# Patient Record
Sex: FEMALE | Race: BLACK OR AFRICAN AMERICAN | Employment: OTHER | URBAN - METROPOLITAN AREA
[De-identification: names, ages, dates, MRNs, and addresses within clinical notes are randomized per-mention and may not be internally consistent; named-entity substitution may affect disease eponyms.]

---

## 2017-01-04 ENCOUNTER — OFFICE VISIT (OUTPATIENT)
Dept: CARDIOLOGY CLINIC | Age: 82
End: 2017-01-04

## 2017-01-04 DIAGNOSIS — Z95.810 S/P IMPLANTATION OF AUTOMATIC CARDIOVERTER/DEFIBRILLATOR (AICD): Primary | ICD-10-CM

## 2017-01-04 DIAGNOSIS — I43 CARDIOMYOPATHY IN OTHER DISEASES CLASSIFIED ELSEWHERE: ICD-10-CM

## 2017-01-04 DIAGNOSIS — I49.5 SICK SINUS SYNDROME (HCC): ICD-10-CM

## 2017-01-04 DIAGNOSIS — I50.22 CHRONIC SYSTOLIC HEART FAILURE (HCC): ICD-10-CM

## 2017-01-06 NOTE — PROGRESS NOTES
See device report- [facility] [place] ICD remote device send, next send in 3 months.

## 2017-01-23 RX ORDER — FUROSEMIDE 80 MG/1
TABLET ORAL
Qty: 180 TAB | Refills: 4 | Status: SHIPPED | OUTPATIENT
Start: 2017-01-23 | End: 2018-01-23 | Stop reason: SDUPTHER

## 2017-01-24 RX ORDER — POTASSIUM CHLORIDE 750 MG/1
TABLET, FILM COATED, EXTENDED RELEASE ORAL
Qty: 270 TAB | Refills: 0 | Status: SHIPPED | OUTPATIENT
Start: 2017-01-24 | End: 2017-08-01 | Stop reason: SDUPTHER

## 2017-03-13 ENCOUNTER — OFFICE VISIT (OUTPATIENT)
Dept: CARDIOLOGY CLINIC | Age: 82
End: 2017-03-13

## 2017-03-13 VITALS
WEIGHT: 203 LBS | DIASTOLIC BLOOD PRESSURE: 70 MMHG | BODY MASS INDEX: 34.66 KG/M2 | HEIGHT: 64 IN | OXYGEN SATURATION: 98 % | RESPIRATION RATE: 18 BRPM | SYSTOLIC BLOOD PRESSURE: 130 MMHG | HEART RATE: 76 BPM

## 2017-03-13 DIAGNOSIS — I25.10 ATHEROSCLEROSIS OF NATIVE CORONARY ARTERY OF NATIVE HEART WITHOUT ANGINA PECTORIS: ICD-10-CM

## 2017-03-13 DIAGNOSIS — I50.42 CHRONIC COMBINED SYSTOLIC AND DIASTOLIC CHF (CONGESTIVE HEART FAILURE) (HCC): Primary | ICD-10-CM

## 2017-03-13 DIAGNOSIS — I43 CARDIOMYOPATHY IN OTHER DISEASES CLASSIFIED ELSEWHERE: ICD-10-CM

## 2017-03-13 DIAGNOSIS — Z95.810 S/P IMPLANTATION OF AUTOMATIC CARDIOVERTER/DEFIBRILLATOR (AICD): ICD-10-CM

## 2017-03-13 DIAGNOSIS — E78.2 MIXED HYPERLIPIDEMIA: ICD-10-CM

## 2017-03-13 DIAGNOSIS — I49.5 SICK SINUS SYNDROME (HCC): ICD-10-CM

## 2017-03-13 DIAGNOSIS — I10 ESSENTIAL HYPERTENSION: Chronic | ICD-10-CM

## 2017-03-13 NOTE — PROGRESS NOTES
[address] [address]  [phone]     Subjective:      Ina Valdivia is a 80 y.o. female is here for routine f/u. The patient denies chest pain/ shortness of breath, orthopnea, PND, LE edema, palpitations, syncope, or presyncope.        Patient Active Problem List    Diagnosis Date Noted    Syncope 05/26/2016    S/P implantation of automatic cardioverter/defibrillator (AICD) 01/14/2015    Pulmonary HTN ([address] 64/84/0934    Systolic and diastolic CHF, acute on chronic ([address] 09/03/2014    Acute-on-chronic respiratory failure ([address] 08/27/2014    Hypothyroidism 08/27/2014    Asthma 06/12/2013    Moderate to severe pulmonary hypertension ([address] 06/12/2013    Ischemic colitis ([address] 05/14/2012    Colitis, ischemic ([address] 03/26/2012    Hypokalemia 03/23/2012    Hypomagnesemia 03/23/2012    Rectal bleeding 03/22/2012    Chronic systolic heart failure ([address] 03/08/2012    Atrial fibrillation ([address] 03/08/2012    Cardiac pacemaker in situ 03/08/2012    Cardiomyopathy in other diseases classified elsewhere ([address] 03/08/2012    Chronic diastolic heart failure ([address] 03/08/2012    Atrial flutter ([address] 03/08/2012    Coronary atherosclerosis of native coronary artery 03/08/2012    Chest pain 02/25/2012    Sick sinus syndrome ([address] 02/24/2012    HTN (hypertension) 02/24/2012    Hyperlipidemia 02/24/2012      Merrick Ulloa MD  Past Medical History:   Diagnosis Date    Asthma     Autoimmune disease ([address]     lupus    CAD (coronary artery disease)     cardiac cath    CAD (coronary artery disease)     sick sinus syndrome    Essential hypertension     GERD (gastroesophageal reflux disease)     Glaucoma     Heart failure (HCC)     HX OTHER MEDICAL     Lupus    Hypertension     Other ill-defined conditions(799.89)     Pacemaker     PUD (peptic ulcer disease)     S/P implantation of automatic cardioverter/defibrillator (AICD) 1/14/2015    [employer] upgrade to AICD implant    SSS (sick sinus syndrome) ([facility]       Past Surgical History:   Procedure Laterality Date    CARDIAC SURG PROCEDURE UNLIST      pacemaker/defibrilator.  HX GYN      BTL    HX PACEMAKER      OK COLONOSCOPY W/BIOPSY SINGLE/MULTIPLE  3/26/2012         OK COLSC FLX W/RMVL OF TUMOR POLYP LESION SNARE TQ  3/26/2012          Allergies   Allergen Reactions    Contrast Agent [Iodine] Anaphylaxis     Made aware of allergy 1/4/13    Sulfa (Sulfonamide Antibiotics) Hives    Codeine Anxiety    Pcn [Penicillins] Anaphylaxis     Reaction was to penicillin injections into buttocks. She can take oral amoxicillin      Family History   Problem Relation Age of Onset    Arrhythmia Mother     Hypertension Mother     Hypertension Father       Social History     Social History    Marital status: SINGLE     Spouse name: N/A    Number of children: N/A    Years of education: N/A     Occupational History    Not on file. Social History Main Topics    Smoking status: Never Smoker    Smokeless tobacco: Never Used    Alcohol use No    Drug use: No    Sexual activity: Not on file     Other Topics Concern    Not on file     Social History Narrative      Current Outpatient Prescriptions   Medication Sig    potassium chloride SR (KLOR-CON 10) 10 mEq tablet TAKE 3 TABLETS BY MOUTH EVERY DAY    furosemide (LASIX) 80 mg tablet TAKE 1 TABLET BY MOUTH TWICE DAILY    carvedilol (COREG) 25 mg tablet TAKE 1 TABLET BY MOUTH TWICE DAILY WITH MEALS    OXYGEN-AIR DELIVERY SYSTEMS by Does Not Apply route.  atorvastatin (LIPITOR) 20 mg tablet Take 1 Tab by mouth daily.  lisinopril (PRINIVIL, ZESTRIL) 10 mg tablet TAKE 1 TABLET BY MOUTH DAILY    NEXIUM 40 mg capsule TAKE ONE CAPSULE BY MOUTH DAILY    FLUVIRIN 6210-2642 susp injection     albuterol-ipratropium (DUONEB) 2.5 mg-0.5 mg/3 ml nebulizer solution 3 mL by Nebulization route every four (4) hours as needed for Wheezing.     aspirin delayed-release 81 mg tablet Take 81 mg by mouth daily.  budesonide (PULMICORT) 180 mcg/actuation aepb inhaler Take 2 puffs by inhalation two (2) times a day.  fluticasone (FLONASE) 50 mcg/actuation nasal spray 2 sprays by Both Nostrils route daily as needed for Rhinitis.  HYDROcodone-acetaminophen (NORCO) 5-325 mg per tablet Take 1 tablet by mouth every four (4) hours as needed for Pain.  ciprofloxacin (CIPRO) 250 mg tablet Take 125 mg by mouth daily.  acetaminophen (TYLENOL ARTHRITIS PAIN) 650 mg CR tablet Take 650 mg by mouth every six (6) hours as needed.  latanoprost (XALATAN) 0.005 % ophthalmic solution Administer 1 Drop to both eyes nightly.  cetirizine (ZYRTEC) 10 mg tablet Take 10 mg by mouth daily. No current facility-administered medications for this visit. Facility-Administered Medications Ordered in Other Visits   Medication Dose Route Frequency    ADDaptor        vancomycin (VANCOCIN) 1,000 mg injection        0.9% sodium chloride (MBP/ADV) 0.9 % infusion        bacitracin 50,000 unit injection             Review of Symptoms:  11 systems reviewed, negative other than as stated in the HPI    Physical ExamPhysical Exam:    Vitals:    03/13/17 1343 03/13/17 1348   BP: 130/80 130/70   Pulse: 76    Resp: 18    SpO2: 98%    Weight: 203 lb (92.1 kg)    Height: 5' 4\" (1.626 m)      Body mass index is 34.84 kg/(m^2). General PE   Gen:  NAD  Mental Status - Alert. General Appearance - Not in acute distress. Chest and Lung Exam   Inspection: Accessory muscles - No use of accessory muscles in breathing. Auscultation:   Breath sounds: - Normal.   Cardiovascular   Inspection: Jugular vein - Bilateral - Inspection Normal.   Palpation/Percussion:   Apical Impulse: - Normal.   Auscultation: Rhythm - Regular. Heart Sounds - S1 WNL and S2 WNL. No S3 or S4. Murmurs & Other Heart Sounds: Auscultation of the heart reveals - No Murmurs.    Peripheral Vascular   Upper Extremity: Inspection - Bilateral - No Cyanotic nailbeds or Digital clubbing. Lower Extremity:   Palpation: Edema - Bilateral - No edema. Abdomen:   Soft, non-tender, bowel sounds are active. Neuro: A&O times 3, CN and motor grossly WNL    Labs:   Lab Results   Component Value Date/Time    Cholesterol, total 124 12/12/2014 08:51 AM    Cholesterol, Total 184 01/02/2013 11:40 AM    HDL Cholesterol 45 12/12/2014 08:51 AM    HDL Cholesterol 44 01/02/2013 11:40 AM    LDL, calculated 64 12/12/2014 08:51 AM    LDL Cholesterol 115 01/02/2013 11:40 AM    Triglyceride 75 12/12/2014 08:51 AM     Lab Results   Component Value Date/Time    CK 87 10/21/2016 12:06 AM     Lab Results   Component Value Date/Time    Sodium 141 10/21/2016 12:06 AM    Potassium 3.3 10/21/2016 12:06 AM    Chloride 101 10/21/2016 12:06 AM    CO2 35 10/21/2016 12:06 AM    Anion gap 5 10/21/2016 12:06 AM    Glucose 132 10/21/2016 12:06 AM    BUN 16 10/21/2016 12:06 AM    Creatinine 1.19 10/21/2016 12:06 AM    BUN/Creatinine ratio 13 10/21/2016 12:06 AM    GFR est AA 53 10/21/2016 12:06 AM    GFR est non-AA 43 10/21/2016 12:06 AM    Calcium 8.3 10/21/2016 12:06 AM    Bilirubin, total 0.6 10/21/2016 12:06 AM    AST (SGOT) 11 10/21/2016 12:06 AM    Alk. phosphatase 75 10/21/2016 12:06 AM    Protein, total 7.7 10/21/2016 12:06 AM    Albumin 3.5 10/21/2016 12:06 AM    Globulin 4.2 10/21/2016 12:06 AM    A-G Ratio 0.8 10/21/2016 12:06 AM    ALT (SGPT) 11 10/21/2016 12:06 AM       EKG:  NSR, LVH     Assessment:     Assessment:      1. Chronic combined systolic and diastolic CHF (congestive heart failure) ([facility]    2. Essential hypertension    3. Sick sinus syndrome ([facility]    4. Mixed hyperlipidemia    5. Cardiomyopathy in other diseases classified elsewhere ([address]    6. Atherosclerosis of native coronary artery of native heart without angina pectoris    7.  S/P implantation of automatic cardioverter/defibrillator (AICD)        Orders Placed This Encounter    LIPID PANEL  AMB POC EKG ROUTINE W/ 12 LEADS, INTER & REP     Order Specific Question:   Reason for Exam:     Answer:   Routine        Plan: Active Problems:  Chronic systolic heart failure ([address] (3/8/2012)- well compensated. Echo with LVEF improved to 40-45% as noted as of May 2016. Creatinine stable on current medications as of October 2016.     S/P implantation of automatic cardioverter/defibrillator (AICD) (1/14/2015)  Overview: [address] upgrade to AICD implant     Syncope (5/26/2016)- probably vasovagal. No events noted on ICD interrogation. Nonorthostatic. Had 60% LAD lesion 2014. ? Atypical presentation of coronary disease. Clinical suspicion for this is low and the patient declined stress test. She states she is 80years old and will \"take her chances on that. \"    CAD:  Clinically stable. Continue aspirin, beta-blocker, and statin. She is due for lipid recheck. Lab slip provided. Counseled on diet and exercise.     Follow up in 6 months, sooner PRN.        Treasure Denis MD

## 2017-03-13 NOTE — MR AVS SNAPSHOT
Visit Information Date & Time Provider Department Dept. Phone Encounter #  
 3/13/2017  1:30 PM [facility] [phone] [id number]  
  
 4/5/2017  8:00 AM  
REMOTE OFFICE VISIT with [facility]) Appt Note: NOT AN OFFICE VISIT - REMOTE BSC ICD  
 [id number] [facility]  
[phone] [id number] [facility] Upcoming Health Maintenance Date Due  
 FOOT EXAM Q1 5/16/1944 MICROALBUMIN Q1 5/16/1944 EYE EXAM RETINAL OR DILATED Q1 5/16/1944 DTaP/Tdap/Td series (1 - Tdap) 5/16/1955 ZOSTER VACCINE AGE 60> 5/16/1994 GLAUCOMA SCREENING Q2Y 5/16/1999 OSTEOPOROSIS SCREENING (DEXA) 5/16/1999 MEDICARE YEARLY EXAM 5/16/1999 HEMOGLOBIN A1C Q6M 7/19/2015 LIPID PANEL Q1 12/12/2015 INFLUENZA AGE 9 TO ADULT 8/1/2016 Pneumococcal 65+ Low/Medium Risk (2 of 2 - PPSV23) 2/25/2017 Allergies as of 3/13/2017  Review Complete On: 3/13/2017 By: Nathen Thurman MD  
  
 Severity Noted Reaction Type Reactions Contrast Agent [Iodine] High 01/04/2013   Systemic Anaphylaxis Made aware of allergy 1/4/13 Sulfa (Sulfonamide Antibiotics) High 02/24/2012   Side Effect Hives Codeine  03/01/2012    Anxiety Pcn [Penicillins]  04/22/2011   Intolerance Anaphylaxis Reaction was to penicillin injections into buttocks. She can take oral amoxicillin Current Immunizations  Reviewed on 8/27/2014 Name Date Influenza Vaccine 10/1/2014 Influenza Vaccine Split 9/5/2011 Pneumococcal Vaccine (Unspecified Type) 2/25/2012  3:47 PM  
  
 Not reviewed this visit You Were Diagnosed With   
  
 Codes Comments Chronic combined systolic and diastolic CHF (congestive heart failure) (HCC)    -  Primary ICD-10-CM: I50.42 
ICD-9-CM: 428.42, 428.0 Essential hypertension     ICD-10-CM: I10 
ICD-9-CM: 401.9 Sick sinus syndrome (HCC)     ICD-10-CM: I49.5 ICD-9-CM: 427.81 Mixed hyperlipidemia     ICD-10-CM: E78.2 ICD-9-CM: 272.2 Cardiomyopathy in other diseases classified elsewhere [facility])     ICD-10-CM: X09 ICD-9-CM: 425.8 Atherosclerosis of native coronary artery of native heart without angina pectoris     ICD-10-CM: I25.10 ICD-9-CM: 414.01   
 S/P implantation of automatic cardioverter/defibrillator (AICD)     ICD-10-CM: Z95.810 ICD-9-CM: V45.02 Vitals BP Pulse Resp Height(growth percentile) Weight(growth percentile) SpO2  
 130/70 (BP 1 Location: Right arm, BP Patient Position: Sitting) 76 18 5' 4\" (1.626 m) 203 lb (92.1 kg) 98% BMI OB Status Smoking Status 34.84 kg/m2 Postmenopausal Never Smoker Vitals History BMI and BSA Data Body Mass Index Body Surface Area 34.84 kg/m 2 2.04 m 2 Preferred Pharmacy Pharmacy Name Phone [facility]  [place] [phone] Your Updated Medication List  
  
   
This list is accurate as of: 3/13/17  2:32 PM.  Always use your most recent med list.  
  
  
  
  
 aspirin delayed-release 81 mg tablet Take 81 mg by mouth daily. atorvastatin 20 mg tablet Commonly known as:  LIPITOR Take 1 Tab by mouth daily. budesonide 180 mcg/actuation Aepb inhaler Commonly known as:  PULMICORT Take 2 puffs by inhalation two (2) times a day. carvedilol 25 mg tablet Commonly known as:  COREG  
TAKE 1 TABLET BY MOUTH TWICE DAILY WITH MEALS  
  
 ciprofloxacin HCl 250 mg tablet Commonly known as:  CIPRO Take 125 mg by mouth daily. DUONEB 2.5 mg-0.5 mg/3 ml Nebu Generic drug:  albuterol-ipratropium 3 mL by Nebulization route every four (4) hours as needed for Wheezing. FLONASE 50 mcg/actuation nasal spray Generic drug:  fluticasone 2 sprays by Both Nostrils route daily as needed for Rhinitis. FLUVIRIN 8693-0845 Susp injection Generic drug:  influenza vaccine 2014-15(4yr+)  
  
 furosemide 80 mg tablet Commonly known as:  LASIX TAKE 1 TABLET BY MOUTH TWICE DAILY HYDROcodone-acetaminophen 5-325 mg per tablet Commonly known as:  Lorena Parisian Take 1 tablet by mouth every four (4) hours as needed for Pain.  
  
 latanoprost 0.005 % ophthalmic solution Commonly known as:  Felisa Sherine Administer 1 Drop to both eyes nightly. lisinopril 10 mg tablet Commonly known as:  PRINIVIL, ZESTRIL  
TAKE 1 TABLET BY MOUTH DAILY NexIUM 40 mg capsule Generic drug:  esomeprazole TAKE ONE CAPSULE BY MOUTH DAILY OXYGEN-AIR DELIVERY SYSTEMS  
by Does Not Apply route. potassium chloride SR 10 mEq tablet Commonly known as:  KLOR-CON 10  
TAKE 3 TABLETS BY MOUTH EVERY DAY  
  
 TYLENOL ARTHRITIS PAIN 650 mg CR tablet Generic drug:  acetaminophen Take 650 mg by mouth every six (6) hours as needed. ZyrTEC 10 mg tablet Generic drug:  cetirizine Take 10 mg by mouth daily. We Performed the Following AMB POC EKG ROUTINE W/ 12 LEADS, INTER & REP [08505 CPT(R)] LIPID PANEL [64946 CPT(R)] Introducing [facility]! Von Rodriguez introduces [facility] patient portal. Now you can access parts of your medical record, email your doctor's office, and request medication refills online. 1. In your internet browser, go to [email or web address] 2. Click on the First Time User? Click Here link in the Sign In box. You will see the New Member Sign Up page. 3. Enter your [employer] Access Code exactly as it appears below. You will not need to use this code after youve completed the sign-up process. If you do not sign up before the expiration date, you must request a new code. · [employer] Access Code: [email or web address] Expires: 4/6/2017 10:57 AM 
 
4.  Enter the last four digits of your Social Security Number (xxxx) and Date of Birth (mm/dd/yyyy) as indicated and click Submit. You will be taken to the next sign-up page. 5. Create a [employer] ID. This will be your [employer] login ID and cannot be changed, so think of one that is secure and easy to remember. 6. Create a [employer] password. You can change your password at any time. 7. Enter your Password Reset Question and Answer. This can be used at a later time if you forget your password. 8. Enter your e-mail address. You will receive e-mail notification when new information is available in [address] 9. Click Sign Up. You can now view and download portions of your medical record. 10. Click the Download Summary menu link to download a portable copy of your medical information. If you have questions, please visit the Frequently Asked Questions section of the [employer] website. Remember, [employer] is NOT to be used for urgent needs. For medical emergencies, dial 911. Now available from your iPhone and Android! Please provide this summary of care documentation to your next provider. Your primary care clinician is listed as Gio Pathak. If you have any questions after today's visit, please call [phone].

## 2017-04-05 ENCOUNTER — OFFICE VISIT (OUTPATIENT)
Dept: CARDIOLOGY CLINIC | Age: 82
End: 2017-04-05

## 2017-04-05 DIAGNOSIS — Z95.810 S/P IMPLANTATION OF AUTOMATIC CARDIOVERTER/DEFIBRILLATOR (AICD): Primary | ICD-10-CM

## 2017-04-05 DIAGNOSIS — I50.43 SYSTOLIC AND DIASTOLIC CHF, ACUTE ON CHRONIC (HCC): ICD-10-CM

## 2017-04-05 DIAGNOSIS — I50.32 CHRONIC DIASTOLIC HEART FAILURE (HCC): ICD-10-CM

## 2017-04-05 DIAGNOSIS — I49.5 SICK SINUS SYNDROME (HCC): ICD-10-CM

## 2017-04-24 RX ORDER — POTASSIUM CHLORIDE 750 MG/1
TABLET, FILM COATED, EXTENDED RELEASE ORAL
Qty: 270 TAB | Refills: 0 | Status: SHIPPED | OUTPATIENT
Start: 2017-04-24 | End: 2018-10-02 | Stop reason: DRUGHIGH

## 2017-04-28 LAB
CHOLEST SERPL-MCNC: 172 MG/DL (ref 100–199)
HDLC SERPL-MCNC: 39 MG/DL
INTERPRETATION, 910389: NORMAL
LDLC SERPL CALC-MCNC: 112 MG/DL (ref 0–99)
TRIGL SERPL-MCNC: 107 MG/DL (ref 0–149)
VLDLC SERPL CALC-MCNC: 21 MG/DL (ref 5–40)

## 2017-05-04 ENCOUNTER — TELEPHONE (OUTPATIENT)
Dept: CARDIOLOGY CLINIC | Age: 82
End: 2017-05-04

## 2017-05-04 DIAGNOSIS — E78.5 HYPERLIPIDEMIA, UNSPECIFIED HYPERLIPIDEMIA TYPE: Primary | ICD-10-CM

## 2017-05-04 DIAGNOSIS — I50.22 CHRONIC SYSTOLIC HEART FAILURE (HCC): ICD-10-CM

## 2017-05-04 RX ORDER — ATORVASTATIN CALCIUM 40 MG/1
40 TABLET, FILM COATED ORAL DAILY
Qty: 30 TAB | Refills: 2 | Status: SHIPPED | OUTPATIENT
Start: 2017-05-04 | End: 2017-07-28 | Stop reason: SDUPTHER

## 2017-05-04 RX ORDER — ATORVASTATIN CALCIUM 40 MG/1
TABLET, FILM COATED ORAL DAILY
COMMUNITY
End: 2017-05-04 | Stop reason: DRUGHIGH

## 2017-05-04 NOTE — TELEPHONE ENCOUNTER
Spoke with patient  Verified patient with 2 patient identifier    Informed per Dr Oly Ling  Cholesterol has almost doubled, concerning for the possibility that she is not taking her cholesterol medicine regularly.  If not, she needs to take it regularly.  If she is taking it regularly, need to increase Lipitor to 40 mg daily and repeat labs in 3 months.                Patient says is taking medication. Will increase Lipitor to 40 mg daily and repeat labs in 3 month, will work on diet.

## 2017-05-04 NOTE — TELEPHONE ENCOUNTER
----- Message from Pramod Stokes MD sent at 5/4/2017  4:14 PM EDT -----  Cholesterol has almost doubled, concerning for the possibility that she is not taking her cholesterol medicine regularly. If not, she needs to take it regularly. If she is taking it regularly, need to increase Lipitor to 40 mg daily and repeat labs in 3 months. Thanks.

## 2017-05-04 NOTE — PROGRESS NOTES
Cholesterol has almost doubled, concerning for the possibility that she is not taking her cholesterol medicine regularly. If not, she needs to take it regularly. If she is taking it regularly, need to increase Lipitor to 40 mg daily and repeat labs in 3 months. Thanks.

## 2017-07-05 ENCOUNTER — OFFICE VISIT (OUTPATIENT)
Dept: CARDIOLOGY CLINIC | Age: 82
End: 2017-07-05

## 2017-07-05 DIAGNOSIS — I50.22 CHRONIC SYSTOLIC HEART FAILURE (HCC): ICD-10-CM

## 2017-07-05 DIAGNOSIS — Z95.810 S/P IMPLANTATION OF AUTOMATIC CARDIOVERTER/DEFIBRILLATOR (AICD): Primary | ICD-10-CM

## 2017-07-05 DIAGNOSIS — I50.32 CHRONIC DIASTOLIC HEART FAILURE (HCC): ICD-10-CM

## 2017-07-05 DIAGNOSIS — I49.5 SICK SINUS SYNDROME (HCC): ICD-10-CM

## 2017-07-05 NOTE — PROGRESS NOTES
See device report - [facility] [place] ICD remote send, next remote send in 3 months. Reminder letter sent to schedule annual visit due.

## 2017-07-28 RX ORDER — ATORVASTATIN CALCIUM 40 MG/1
TABLET, FILM COATED ORAL
Qty: 90 TAB | Refills: 3 | Status: SHIPPED | OUTPATIENT
Start: 2017-07-28

## 2017-08-01 ENCOUNTER — OFFICE VISIT (OUTPATIENT)
Dept: CARDIOLOGY CLINIC | Age: 82
End: 2017-08-01

## 2017-08-01 VITALS
OXYGEN SATURATION: 97 % | SYSTOLIC BLOOD PRESSURE: 132 MMHG | DIASTOLIC BLOOD PRESSURE: 68 MMHG | HEART RATE: 80 BPM | RESPIRATION RATE: 20 BRPM | WEIGHT: 203.9 LBS | BODY MASS INDEX: 34.81 KG/M2 | HEIGHT: 64 IN

## 2017-08-01 DIAGNOSIS — I25.10 ATHEROSCLEROSIS OF NATIVE CORONARY ARTERY OF NATIVE HEART WITHOUT ANGINA PECTORIS: ICD-10-CM

## 2017-08-01 DIAGNOSIS — I27.20 MODERATE TO SEVERE PULMONARY HYPERTENSION (HCC): ICD-10-CM

## 2017-08-01 DIAGNOSIS — Z95.0 CARDIAC PACEMAKER IN SITU: ICD-10-CM

## 2017-08-01 DIAGNOSIS — E78.5 HYPERLIPIDEMIA, UNSPECIFIED HYPERLIPIDEMIA TYPE: ICD-10-CM

## 2017-08-01 DIAGNOSIS — Z95.810 S/P IMPLANTATION OF AUTOMATIC CARDIOVERTER/DEFIBRILLATOR (AICD): ICD-10-CM

## 2017-08-01 DIAGNOSIS — I49.5 SICK SINUS SYNDROME (HCC): ICD-10-CM

## 2017-08-01 DIAGNOSIS — R07.89 CHEST TIGHTNESS OR PRESSURE: Primary | ICD-10-CM

## 2017-08-01 DIAGNOSIS — I50.32 CHRONIC DIASTOLIC HEART FAILURE (HCC): ICD-10-CM

## 2017-08-01 NOTE — LETTER
8/1/2017 2:03 PM 
 
Patient:  Colleen Solomon YOB: 1934 Date of Visit: 8/1/2017 Dear MD Megan Fontenot [facility] [address] [id number] [address] Facsimile: [phone] 
 : 
 
 
Thank you for referring Ms. Janice Cruz to me for evaluation/treatment. Below are the relevant portions of my assessment and plan of care. If you have questions, please do not hesitate to call me. I look forward to following Ms. Alvarez along with you. Sincerely, Richad Mortimer, NP

## 2017-08-04 DIAGNOSIS — I50.22 CHRONIC SYSTOLIC HEART FAILURE (HCC): ICD-10-CM

## 2017-08-04 DIAGNOSIS — E78.5 HYPERLIPIDEMIA, UNSPECIFIED HYPERLIPIDEMIA TYPE: ICD-10-CM

## 2017-08-22 ENCOUNTER — APPOINTMENT (OUTPATIENT)
Dept: CT IMAGING | Age: 82
End: 2017-08-22
Attending: EMERGENCY MEDICINE
Payer: MEDICARE

## 2017-08-22 ENCOUNTER — HOSPITAL ENCOUNTER (EMERGENCY)
Age: 82
Discharge: HOME | End: 2017-08-22
Attending: EMERGENCY MEDICINE
Payer: MEDICARE

## 2017-08-22 ENCOUNTER — APPOINTMENT (OUTPATIENT)
Dept: GENERAL RADIOLOGY | Age: 82
End: 2017-08-22
Attending: EMERGENCY MEDICINE
Payer: MEDICARE

## 2017-08-22 VITALS
HEART RATE: 78 BPM | OXYGEN SATURATION: 97 % | HEIGHT: 62 IN | BODY MASS INDEX: 37.36 KG/M2 | DIASTOLIC BLOOD PRESSURE: 66 MMHG | TEMPERATURE: 98.2 F | SYSTOLIC BLOOD PRESSURE: 156 MMHG | WEIGHT: 203 LBS | RESPIRATION RATE: 20 BRPM

## 2017-08-22 DIAGNOSIS — D64.9 ANEMIA, UNSPECIFIED TYPE: ICD-10-CM

## 2017-08-22 DIAGNOSIS — N39.0 URINARY TRACT INFECTION WITH HEMATURIA, SITE UNSPECIFIED: Primary | ICD-10-CM

## 2017-08-22 DIAGNOSIS — R42 DIZZINESS: ICD-10-CM

## 2017-08-22 DIAGNOSIS — R31.9 URINARY TRACT INFECTION WITH HEMATURIA, SITE UNSPECIFIED: Primary | ICD-10-CM

## 2017-08-22 LAB
ALBUMIN SERPL-MCNC: 3.4 G/DL (ref 3.5–5)
ALBUMIN/GLOB SERPL: 0.8 {RATIO} (ref 1.1–2.2)
ALP SERPL-CCNC: 89 U/L (ref 45–117)
ALT SERPL-CCNC: 13 U/L (ref 12–78)
ANION GAP SERPL CALC-SCNC: 1 MMOL/L (ref 5–15)
APPEARANCE UR: ABNORMAL
AST SERPL-CCNC: 14 U/L (ref 15–37)
ATRIAL RATE: 84 BPM
BACTERIA URNS QL MICRO: ABNORMAL /HPF
BASOPHILS # BLD: 0 K/UL (ref 0–0.1)
BASOPHILS NFR BLD: 0 % (ref 0–1)
BILIRUB SERPL-MCNC: 0.5 MG/DL (ref 0.2–1)
BILIRUB UR QL: NEGATIVE
BNP SERPL-MCNC: 772 PG/ML (ref 0–450)
BUN SERPL-MCNC: 15 MG/DL (ref 6–20)
BUN/CREAT SERPL: 14 (ref 12–20)
CALCIUM SERPL-MCNC: 8.5 MG/DL (ref 8.5–10.1)
CALCULATED P AXIS, ECG09: 38 DEGREES
CALCULATED R AXIS, ECG10: -33 DEGREES
CALCULATED T AXIS, ECG11: 69 DEGREES
CHLORIDE SERPL-SCNC: 100 MMOL/L (ref 97–108)
CK MB CFR SERPL CALC: NORMAL % (ref 0–2.5)
CK MB SERPL-MCNC: <1 NG/ML (ref 5–25)
CK SERPL-CCNC: 77 U/L (ref 26–192)
CO2 SERPL-SCNC: 39 MMOL/L (ref 21–32)
COLOR UR: ABNORMAL
CREAT SERPL-MCNC: 1.08 MG/DL (ref 0.55–1.02)
DIAGNOSIS, 93000: NORMAL
EOSINOPHIL # BLD: 0.3 K/UL (ref 0–0.4)
EOSINOPHIL NFR BLD: 4 % (ref 0–7)
EPITH CASTS URNS QL MICRO: ABNORMAL /LPF
ERYTHROCYTE [DISTWIDTH] IN BLOOD BY AUTOMATED COUNT: 13.2 % (ref 11.5–14.5)
GLOBULIN SER CALC-MCNC: 4.5 G/DL (ref 2–4)
GLUCOSE SERPL-MCNC: 139 MG/DL (ref 65–100)
GLUCOSE UR STRIP.AUTO-MCNC: NEGATIVE MG/DL
HCT VFR BLD AUTO: 31 % (ref 35–47)
HGB BLD-MCNC: 9.4 G/DL (ref 11.5–16)
HGB UR QL STRIP: ABNORMAL
HYALINE CASTS URNS QL MICRO: ABNORMAL /LPF (ref 0–5)
KETONES UR QL STRIP.AUTO: NEGATIVE MG/DL
LEUKOCYTE ESTERASE UR QL STRIP.AUTO: ABNORMAL
LYMPHOCYTES # BLD: 1.9 K/UL (ref 0.8–3.5)
LYMPHOCYTES NFR BLD: 24 % (ref 12–49)
MCH RBC QN AUTO: 27.3 PG (ref 26–34)
MCHC RBC AUTO-ENTMCNC: 30.3 G/DL (ref 30–36.5)
MCV RBC AUTO: 90.1 FL (ref 80–99)
MONOCYTES # BLD: 0.9 K/UL (ref 0–1)
MONOCYTES NFR BLD: 12 % (ref 5–13)
NEUTS SEG # BLD: 4.8 K/UL (ref 1.8–8)
NEUTS SEG NFR BLD: 60 % (ref 32–75)
NITRITE UR QL STRIP.AUTO: NEGATIVE
P-R INTERVAL, ECG05: 178 MS
PH UR STRIP: 6 [PH] (ref 5–8)
PLATELET # BLD AUTO: 267 K/UL (ref 150–400)
POTASSIUM SERPL-SCNC: 3.4 MMOL/L (ref 3.5–5.1)
PROT SERPL-MCNC: 7.9 G/DL (ref 6.4–8.2)
PROT UR STRIP-MCNC: NEGATIVE MG/DL
Q-T INTERVAL, ECG07: 412 MS
QRS DURATION, ECG06: 118 MS
QTC CALCULATION (BEZET), ECG08: 486 MS
RBC # BLD AUTO: 3.44 M/UL (ref 3.8–5.2)
RBC #/AREA URNS HPF: ABNORMAL /HPF (ref 0–5)
SODIUM SERPL-SCNC: 140 MMOL/L (ref 136–145)
SP GR UR REFRACTOMETRY: 1.01 (ref 1–1.03)
TROPONIN I SERPL-MCNC: <0.04 NG/ML
UA: UC IF INDICATED,UAUC: ABNORMAL
UROBILINOGEN UR QL STRIP.AUTO: 1 EU/DL (ref 0.2–1)
VENTRICULAR RATE, ECG03: 84 BPM
WBC # BLD AUTO: 7.9 K/UL (ref 3.6–11)
WBC URNS QL MICRO: ABNORMAL /HPF (ref 0–4)

## 2017-08-22 PROCEDURE — 96366 THER/PROPH/DIAG IV INF ADDON: CPT

## 2017-08-22 PROCEDURE — 36415 COLL VENOUS BLD VENIPUNCTURE: CPT | Performed by: EMERGENCY MEDICINE

## 2017-08-22 PROCEDURE — 87186 SC STD MICRODIL/AGAR DIL: CPT | Performed by: EMERGENCY MEDICINE

## 2017-08-22 PROCEDURE — 96365 THER/PROPH/DIAG IV INF INIT: CPT

## 2017-08-22 PROCEDURE — 87077 CULTURE AEROBIC IDENTIFY: CPT | Performed by: EMERGENCY MEDICINE

## 2017-08-22 PROCEDURE — 87086 URINE CULTURE/COLONY COUNT: CPT | Performed by: EMERGENCY MEDICINE

## 2017-08-22 PROCEDURE — 74011250636 HC RX REV CODE- 250/636: Performed by: EMERGENCY MEDICINE

## 2017-08-22 PROCEDURE — 83880 ASSAY OF NATRIURETIC PEPTIDE: CPT | Performed by: EMERGENCY MEDICINE

## 2017-08-22 PROCEDURE — 85025 COMPLETE CBC W/AUTO DIFF WBC: CPT | Performed by: EMERGENCY MEDICINE

## 2017-08-22 PROCEDURE — 80053 COMPREHEN METABOLIC PANEL: CPT | Performed by: EMERGENCY MEDICINE

## 2017-08-22 PROCEDURE — 99285 EMERGENCY DEPT VISIT HI MDM: CPT

## 2017-08-22 PROCEDURE — 74011000258 HC RX REV CODE- 258: Performed by: EMERGENCY MEDICINE

## 2017-08-22 PROCEDURE — 93005 ELECTROCARDIOGRAM TRACING: CPT

## 2017-08-22 PROCEDURE — 81001 URINALYSIS AUTO W/SCOPE: CPT | Performed by: EMERGENCY MEDICINE

## 2017-08-22 PROCEDURE — 71020 XR CHEST PA LAT: CPT

## 2017-08-22 PROCEDURE — 70450 CT HEAD/BRAIN W/O DYE: CPT

## 2017-08-22 PROCEDURE — 82550 ASSAY OF CK (CPK): CPT | Performed by: EMERGENCY MEDICINE

## 2017-08-22 PROCEDURE — 84484 ASSAY OF TROPONIN QUANT: CPT | Performed by: EMERGENCY MEDICINE

## 2017-08-22 RX ORDER — SODIUM CHLORIDE 0.9 % (FLUSH) 0.9 %
5-10 SYRINGE (ML) INJECTION EVERY 8 HOURS
Status: DISCONTINUED | OUTPATIENT
Start: 2017-08-22 | End: 2017-08-22 | Stop reason: HOSPADM

## 2017-08-22 RX ORDER — CEFUROXIME AXETIL 500 MG/1
500 TABLET ORAL 2 TIMES DAILY
Qty: 14 TAB | Refills: 0 | Status: SHIPPED | OUTPATIENT
Start: 2017-08-22 | End: 2017-08-29

## 2017-08-22 RX ORDER — SODIUM CHLORIDE 0.9 % (FLUSH) 0.9 %
5-10 SYRINGE (ML) INJECTION AS NEEDED
Status: DISCONTINUED | OUTPATIENT
Start: 2017-08-22 | End: 2017-08-22 | Stop reason: HOSPADM

## 2017-08-22 RX ADMIN — CEFTRIAXONE 1 G: 1 INJECTION, POWDER, FOR SOLUTION INTRAMUSCULAR; INTRAVENOUS at 03:47

## 2017-08-22 NOTE — DISCHARGE INSTRUCTIONS
Anemia: Care Instructions  Your Care Instructions    Anemia is a low level of red blood cells, which carry oxygen throughout your body. Many things can cause anemia. Lack of iron is one of the most common causes. Your body needs iron to make hemoglobin, a substance in red blood cells that carries oxygen from the lungs to your body's cells. Without enough iron, the body produces fewer and smaller red blood cells. As a result, your body's cells do not get enough oxygen, and you feel tired and weak. And you may have trouble concentrating. Bleeding is the most common cause of a lack of iron. You may have heavy menstrual bleeding or bleeding caused by conditions such as ulcers, hemorrhoids, or cancer. Regular use of aspirin or other anti-inflammatory medicines (such as ibuprofen) also can cause bleeding in some people. A lack of iron in your diet also can cause anemia, especially at times when the body needs more iron, such as during pregnancy, infancy, and the teen years. Your doctor may have prescribed iron pills. It may take several months of treatment for your iron levels to return to normal. Your doctor also may suggest that you eat foods that are rich in iron, such as meat and beans. There are many other causes of anemia. It is not always due to a lack of iron. Finding the specific cause of your anemia will help your doctor find the right treatment for you. Follow-up care is a key part of your treatment and safety. Be sure to make and go to all appointments, and call your doctor if you are having problems. It's also a good idea to know your test results and keep a list of the medicines you take. How can you care for yourself at home? · Take your medicines exactly as prescribed. Call your doctor if you think you are having a problem with your medicine. · If your doctor recommends iron pills, take them as directed:  ¨ Try to take the pills on an empty stomach about 1 hour before or 2 hours after meals. But you may need to take iron with food to avoid an upset stomach. ¨ Do not take antacids or drink milk or caffeine drinks (such as coffee, tea, or cola) at the same time or within 2 hours of the time that you take your iron. They can make it hard for your body to absorb the iron. ¨ Vitamin C (from food or supplements) helps your body absorb iron. Try taking iron pills with a glass of orange juice or some other food that is high in vitamin C, such as citrus fruits. ¨ Iron pills may cause stomach problems, such as heartburn, nausea, diarrhea, constipation, and cramps. Be sure to drink plenty of fluids, and include fruits, vegetables, and fiber in your diet each day. Iron pills often make your bowel movements dark or green. ¨ If you forget to take an iron pill, do not take a double dose of iron the next time you take a pill. ¨ Keep iron pills out of the reach of small children. An overdose of iron can be very dangerous. · Follow your doctor's advice about eating iron-rich foods. These include red meat, shellfish, poultry, eggs, beans, raisins, whole-grain bread, and leafy green vegetables. · Steam vegetables to help them keep their iron content. When should you call for help? Call 911 anytime you think you may need emergency care. For example, call if:  · You have symptoms of a heart attack. These may include:  ¨ Chest pain or pressure, or a strange feeling in the chest.  ¨ Sweating. ¨ Shortness of breath. ¨ Nausea or vomiting. ¨ Pain, pressure, or a strange feeling in the back, neck, jaw, or upper belly or in one or both shoulders or arms. ¨ Lightheadedness or sudden weakness. ¨ A fast or irregular heartbeat. After you call 911, the  may tell you to chew 1 adult-strength or 2 to 4 low-dose aspirin. Wait for an ambulance. Do not try to drive yourself. · You passed out (lost consciousness).   Call your doctor now or seek immediate medical care if:  · You have new or increased shortness of breath. · You are dizzy or lightheaded, or you feel like you may faint. · Your fatigue and weakness continue or get worse. · You have any abnormal bleeding, such as:  ¨ Nosebleeds. ¨ Vaginal bleeding that is different (heavier, more frequent, at a different time of the month) than what you are used to. ¨ Bloody or black stools, or rectal bleeding. ¨ Bloody or pink urine. Watch closely for changes in your health, and be sure to contact your doctor if:  · You do not get better as expected. Where can you learn more? Go to http://nikki-elizabeth.info/. Enter R301 in the search box to learn more about \"Anemia: Care Instructions. \"  Current as of: October 13, 2016  Content Version: 11.3  © 7849-7105 [employer]. Care instructions adapted under license by [employer] (which disclaims liability or warranty for this information). If you have questions about a medical condition or this instruction, always ask your healthcare professional. [place] [address] any warranty or liability for your use of this information. Dizziness: Care Instructions  Your Care Instructions  Dizziness is the feeling of unsteadiness or fuzziness in your head. It is different than having vertigo, which is a feeling that the room is spinning or that you are moving or falling. It is also different from lightheadedness, which is the feeling that you are about to faint. It can be hard to know what causes dizziness. Some people feel dizzy when they have migraine headaches. Sometimes bouts of flu can make you feel dizzy. Some medical conditions, such as heart problems or high blood pressure, can make you feel dizzy. Many medicines can cause dizziness, including medicines for high blood pressure, pain, or anxiety. If a medicine causes your symptoms, your doctor may recommend that you stop or change the medicine.  If it is a problem with your heart, you may need medicine to help your heart work better. If there is no clear reason for your symptoms, your doctor may suggest watching and waiting for a while to see if the dizziness goes away on its own. Follow-up care is a key part of your treatment and safety. Be sure to make and go to all appointments, and call your doctor if you are having problems. It's also a good idea to know your test results and keep a list of the medicines you take. How can you care for yourself at home? · If your doctor recommends or prescribes medicine, take it exactly as directed. Call your doctor if you think you are having a problem with your medicine. · Do not drive while you feel dizzy. · Try to prevent falls. Steps you can take include:  ¨ Using nonskid mats, adding grab bars near the tub, and using night-lights. ¨ Clearing your home so that walkways are free of anything you might trip on. ¨ Letting family and friends know that you have been feeling dizzy. This will help them know how to help you. When should you call for help? Call 911 anytime you think you may need emergency care. For example, call if:  · You passed out (lost consciousness). · You have dizziness along with symptoms of a heart attack. These may include:  ¨ Chest pain or pressure, or a strange feeling in the chest.  ¨ Sweating. ¨ Shortness of breath. ¨ Nausea or vomiting. ¨ Pain, pressure, or a strange feeling in the back, neck, jaw, or upper belly or in one or both shoulders or arms. ¨ Lightheadedness or sudden weakness. ¨ A fast or irregular heartbeat. · You have symptoms of a stroke. These may include:  ¨ Sudden numbness, tingling, weakness, or loss of movement in your face, arm, or leg, especially on only one side of your body. ¨ Sudden vision changes. ¨ Sudden trouble speaking. ¨ Sudden confusion or trouble understanding simple statements. ¨ Sudden problems with walking or balance. ¨ A sudden, severe headache that is different from past headaches.   Call your doctor now or seek immediate medical care if:  · You feel dizzy and have a fever, headache, or ringing in your ears. · You have new or increased nausea and vomiting. · Your dizziness does not go away or comes back. Watch closely for changes in your health, and be sure to contact your doctor if:  · You do not get better as expected. Where can you learn more? Go to http://nikki-elizabeth.info/. Enter C971 in the search box to learn more about \"Dizziness: Care Instructions. \"  Current as of: March 20, 2017  Content Version: 11.3  © 2800-1300 [employer]. Care instructions adapted under license by [employer] (which disclaims liability or warranty for this information). If you have questions about a medical condition or this instruction, always ask your healthcare professional. [address] any warranty or liability for your use of this information. Urinary Tract Infection in Women: Care Instructions  Your Care Instructions    A urinary tract infection, or UTI, is a general term for an infection anywhere between the kidneys and the urethra (where urine comes out). Most UTIs are bladder infections. They often cause pain or burning when you urinate. UTIs are caused by bacteria and can be cured with antibiotics. Be sure to complete your treatment so that the infection goes away. Follow-up care is a key part of your treatment and safety. Be sure to make and go to all appointments, and call your doctor if you are having problems. It's also a good idea to know your test results and keep a list of the medicines you take. How can you care for yourself at home? · Take your antibiotics as directed. Do not stop taking them just because you feel better. You need to take the full course of antibiotics. · Drink extra water and other fluids for the next day or two. This may help wash out the bacteria that are causing the infection.  (If you have kidney, heart, or liver disease and have to limit fluids, talk with your doctor before you increase your fluid intake.)  · Avoid drinks that are carbonated or have caffeine. They can irritate the bladder. · Urinate often. Try to empty your bladder each time. · To relieve pain, take a hot bath or lay a heating pad set on low over your lower belly or genital area. Never go to sleep with a heating pad in place. To prevent UTIs  · Drink plenty of water each day. This helps you urinate often, which clears bacteria from your system. (If you have kidney, heart, or liver disease and have to limit fluids, talk with your doctor before you increase your fluid intake.)  · Urinate when you need to. · Urinate right after you have sex. · Change sanitary pads often. · Avoid douches, bubble baths, feminine hygiene sprays, and other feminine hygiene products that have deodorants. · After going to the bathroom, wipe from front to back. When should you call for help? Call your doctor now or seek immediate medical care if:  · Symptoms such as fever, chills, nausea, or vomiting get worse or appear for the first time. · You have new pain in your back just below your rib cage. This is called flank pain. · There is new blood or pus in your urine. · You have any problems with your antibiotic medicine. Watch closely for changes in your health, and be sure to contact your doctor if:  · You are not getting better after taking an antibiotic for 2 days. · Your symptoms go away but then come back. Where can you learn more? Go to http://nikki-elizabeth.info/. Enter U317 in the search box to learn more about \"Urinary Tract Infection in Women: Care Instructions. \"  Current as of: November 28, 2016  Content Version: 11.3  © 2632-9883 [employer]. Care instructions adapted under license by [employer] (which disclaims liability or warranty for this information).  If you have questions about a medical condition or this instruction, always ask your healthcare professional. [place] [address] any warranty or liability for your use of this information.

## 2017-08-22 NOTE — ED PROVIDER NOTES
HPI Comments: Dina Galan is a 80 y.o. female, pmhx HTN / CAD / GERD / CHF, who presents via EMS to the ED for evaluation s/p syncopal episode PTA this morning. Per EMS, pt's family stated the pt had a syncopal episode and hit her head this morning. EMS notes findings the pt sitting on the bed in no apparent distress. Pt currently c/o nausea, but denies having a syncopal episode. She notes she received the PNA immunization today and states \"I think I had an allergic reaction to something in the shot\". Pt denies any recent medications, but notes adherence with her recommended daily meds. Per EMS, family states the pt has a hx of similar sxs and \"usually does this once per year\". Pt reports using home O2 at baseline. Nursing notes pt had SpO2 of 82% with exertion on arrival to the ED; pt placed on 2L NC with improvement to 99%. Pt specifically denies any recent fever, chills, vomiting, diarrhea, abd pain, CP, SOB, lightheadedness, dizziness, numbness, weakness, tingling, BLE swelling, HA, heart palpitations, urinary sxs, changes in BM, changes in PO intake, melena, hematochezia, cough, or congestion. PCP: Teresa Crump MD  Cardiology: Vandana Portillo    PMHx: Significant for HTN, asthma, Lupus, SSS, CAD, heart failure, GERD, PUD  PSHx: Significant for AICD placement, BL tubal ligation, colonoscopy  Social Hx: -tobacco, -EtOH, -Illicit Drugs    There are no other complaints, changes, or physical findings at this time. The history is provided by the patient and the EMS personnel.         Past Medical History:   Diagnosis Date    Asthma     Autoimmune disease ([facility] [address]     lupus    CAD (coronary artery disease)     cardiac cath    CAD (coronary artery disease)     sick sinus syndrome    Essential hypertension     GERD (gastroesophageal reflux disease)     Glaucoma     Heart failure (HCC)     HX OTHER MEDICAL     Lupus    Hypertension     Other ill-defined conditions     Pacemaker     PUD (peptic ulcer disease)     S/P implantation of automatic cardioverter/defibrillator (AICD) 1/14/2015    [employer] upgrade to AICD implant    SSS (sick sinus syndrome) ([facility] [address]        Past Surgical History:   Procedure Laterality Date    CARDIAC SURG PROCEDURE UNLIST      pacemaker/defibrilator.  HX GYN      BTL    HX PACEMAKER      IA COLONOSCOPY W/BIOPSY SINGLE/MULTIPLE  3/26/2012         IA COLSC FLX W/RMVL OF TUMOR POLYP LESION SNARE TQ  3/26/2012              Family History:   Problem Relation Age of Onset    Arrhythmia Mother     Hypertension Mother     Hypertension Father        Social History     Social History    Marital status: SINGLE     Spouse name: N/A    Number of children: N/A    Years of education: N/A     Occupational History    Not on file. Social History Main Topics    Smoking status: Never Smoker    Smokeless tobacco: Never Used    Alcohol use No    Drug use: No    Sexual activity: Not on file     Other Topics Concern    Not on file     Social History Narrative         ALLERGIES: Contrast agent [iodine]; Sulfa (sulfonamide antibiotics); Codeine; and Pcn [penicillins]    Review of Systems   Constitutional: Negative for chills and fever. HENT: Negative for congestion, ear pain, rhinorrhea and sore throat. Respiratory: Negative for cough and shortness of breath. Cardiovascular: Negative for chest pain, palpitations and leg swelling. Gastrointestinal: Negative for abdominal pain, constipation, diarrhea, nausea and vomiting. No melena  No hematochezia   Endocrine: Negative for polyuria. Genitourinary: Negative for dysuria, frequency and hematuria. Neurological: Positive for syncope. Negative for dizziness, weakness, light-headedness, numbness and headaches. No tingling   All other systems reviewed and are negative.       Vitals:    08/22/17 0145 08/22/17 0227 08/22/17 0229 08/22/17 0302   BP: 129/64 161/85  156/66   Pulse: 77  78 78   Resp: 22  15 20 Temp:       SpO2: 97%  94% 97%   Weight:       Height:                Physical Exam   Nursing note and vitals reviewed. General appearance - well nourished, well appearing, and in no distress  Eyes - pupils equal and reactive, extraocular eye movements intact  ENT - mucous membranes moist, pharynx normal without lesions  Neck - supple, no significant adenopathy; non-tender to palpation  Chest - clear to auscultation, no wheezes, rales or rhonchi; non-tender to palpation, pacemaker/defibrillator placed in L upper chest wall  Heart - normal rate and regular rhythm, S1 and S2 normal, no murmurs noted  Abdomen - soft, nontender, nondistended, no masses or organomegaly  Musculoskeletal - no joint tenderness, deformity or swelling; normal ROM  Extremities - peripheral pulses normal, no pedal edema  Skin - normal coloration and turgor, no rashes  Neurological - alert, oriented to person and place, thought it was 1917, normal speech, no focal findings or movement disorder noted  Written by KIM Javieribkeena, as dictated by Brandon Tucker MD      MDM  Number of Diagnoses or Management Options  Anemia, unspecified type:   Dizziness:   Urinary tract infection with hematuria, site unspecified:   Diagnosis management comments:   DDx: dehydration, UTI, head injury, arrhythmia, orthostatic hypotension       Amount and/or Complexity of Data Reviewed  Clinical lab tests: reviewed and ordered  Tests in the radiology section of CPT®: reviewed and ordered  Tests in the medicine section of CPT®: reviewed and ordered  Obtain history from someone other than the patient: yes (EMS)  Review and summarize past medical records: yes  Independent visualization of images, tracings, or specimens: yes    Patient Progress  Patient progress: stable      Procedures    EKG interpretation: (Preliminary) 0135  Rhythm: sinus rhythm with occasional PVC's.  Rate (approx.): 84bpm; Axis: left axis deviation; Normal IN, QTc intervals; widened QRS; ST/T wave: non-specific changes; Other findings: non-ischemic. Written by KIM Francis, as dictated by Kalia Rocha MD     PROGRESS NOTE:  3:25 AM  Pt reevaluated. On reevaluation pt states she recalls getting up to use the bedside commode this morning and feeling dizzy when she sat back on her bed. Pt continues to deny any syncopal episode. She reports a hx of chronic UTIs for which she takes 250mg Cipro daily for prevention. Pt noted to currently have a UTI; chart review shows pt has tolerated Keflex without difficulty before. Will treat with Rocephin in the ED and an rx for Keflex to take home. Pt finishing IVF at this time. Will continue to monitor. Written by KIM Francis, as dictated by Lela Renee MD     Progress note:  4:50 AM  Pt noted to be feeling better, ready for discharge. Updated pt and/or family on all final lab and imaging findings. Will follow up as instructed. All questions have been answered, pt voiced understanding and agreement with plan. Abx were prescribed, pt advised that diarrhea and rash are possible side effects of the medications. Specific return precautions provided as well as instructions to return to the ED should sx worsen at any time. Vital signs stable for discharge.    Written by KIM Francis, as dictated by Lela Oates MD    LABORATORY TESTS:  Recent Results (from the past 12 hour(s))   EKG, 12 LEAD, INITIAL    Collection Time: 08/22/17  1:32 AM   Result Value Ref Range    Ventricular Rate 84 BPM    Atrial Rate 84 BPM    P-R Interval 178 ms    QRS Duration 118 ms    Q-T Interval 412 ms    QTC Calculation (Bezet) 486 ms    Calculated P Axis 38 degrees    Calculated R Axis -33 degrees    Calculated T Axis 69 degrees    Diagnosis       Sinus rhythm with marked sinus arrhythmia with occasional ventricular-paced   complexes  Left axis deviation  Left ventricular hypertrophy with QRS widening  When compared with ECG of 20-OCT-2016 23:48,  Electronic ventricular pacemaker has replaced Sinus rhythm     CBC WITH AUTOMATED DIFF    Collection Time: 08/22/17  1:46 AM   Result Value Ref Range    WBC 7.9 3.6 - 11.0 K/uL    RBC 3.44 (L) 3.80 - 5.20 M/uL    HGB 9.4 (L) 11.5 - 16.0 g/dL    HCT 31.0 (L) 35.0 - 47.0 %    MCV 90.1 80.0 - 99.0 FL    MCH 27.3 26.0 - 34.0 PG    MCHC 30.3 30.0 - 36.5 g/dL    RDW 13.2 11.5 - 14.5 %    PLATELET 018 862 - 654 K/uL    NEUTROPHILS 60 32 - 75 %    LYMPHOCYTES 24 12 - 49 %    MONOCYTES 12 5 - 13 %    EOSINOPHILS 4 0 - 7 %    BASOPHILS 0 0 - 1 %    ABS. NEUTROPHILS 4.8 1.8 - 8.0 K/UL    ABS. LYMPHOCYTES 1.9 0.8 - 3.5 K/UL    ABS. MONOCYTES 0.9 0.0 - 1.0 K/UL    ABS. EOSINOPHILS 0.3 0.0 - 0.4 K/UL    ABS. BASOPHILS 0.0 0.0 - 0.1 K/UL   METABOLIC PANEL, COMPREHENSIVE    Collection Time: 08/22/17  1:46 AM   Result Value Ref Range    Sodium 140 136 - 145 mmol/L    Potassium 3.4 (L) 3.5 - 5.1 mmol/L    Chloride 100 97 - 108 mmol/L    CO2 39 (H) 21 - 32 mmol/L    Anion gap 1 (L) 5 - 15 mmol/L    Glucose 139 (H) 65 - 100 mg/dL    BUN 15 6 - 20 MG/DL    Creatinine 1.08 (H) 0.55 - 1.02 MG/DL    BUN/Creatinine ratio 14 12 - 20      GFR est AA 59 (L) >60 ml/min/1.73m2    GFR est non-AA 48 (L) >60 ml/min/1.73m2    Calcium 8.5 8.5 - 10.1 MG/DL    Bilirubin, total 0.5 0.2 - 1.0 MG/DL    ALT (SGPT) 13 12 - 78 U/L    AST (SGOT) 14 (L) 15 - 37 U/L    Alk.  phosphatase 89 45 - 117 U/L    Protein, total 7.9 6.4 - 8.2 g/dL    Albumin 3.4 (L) 3.5 - 5.0 g/dL    Globulin 4.5 (H) 2.0 - 4.0 g/dL    A-G Ratio 0.8 (L) 1.1 - 2.2     CK W/ CKMB & INDEX    Collection Time: 08/22/17  1:46 AM   Result Value Ref Range    CK 77 26 - 192 U/L    CK - MB <1.0 <3.6 NG/ML    CK-MB Index Cannot be calculated 0 - 2.5     TROPONIN I    Collection Time: 08/22/17  1:46 AM   Result Value Ref Range    Troponin-I, Qt. <0.04 <0.05 ng/mL   NT-PRO BNP    Collection Time: 08/22/17  1:46 AM   Result Value Ref Range    NT pro- (H) 0 - 450 PG/ML URINALYSIS W/ REFLEX CULTURE    Collection Time: 08/22/17  2:47 AM   Result Value Ref Range    Color YELLOW/STRAW      Appearance CLOUDY (A) CLEAR      Specific gravity 1.011 1.003 - 1.030      pH (UA) 6.0 5.0 - 8.0      Protein NEGATIVE  NEG mg/dL    Glucose NEGATIVE  NEG mg/dL    Ketone NEGATIVE  NEG mg/dL    Bilirubin NEGATIVE  NEG      Blood TRACE (A) NEG      Urobilinogen 1.0 0.2 - 1.0 EU/dL    Nitrites NEGATIVE  NEG      Leukocyte Esterase LARGE (A) NEG      WBC  0 - 4 /hpf    RBC 5-10 0 - 5 /hpf    Epithelial cells FEW FEW /lpf    Bacteria 4+ (A) NEG /hpf    UA:UC IF INDICATED URINE CULTURE ORDERED (A) CNI      Hyaline cast 0-2 0 - 5 /lpf       IMAGING RESULTS:     CXR Results  (Last 48 hours)               08/22/17 0229  XR CHEST PA LAT Final result    Impression:  IMPRESSION:       No acute process. Narrative:  EXAM:  XR CHEST PA LAT       INDICATION:  Syncope. Chest pain. COMPARISON: 10/21/2016       TECHNIQUE: AP semiupright and lateral chest views       FINDINGS: The left chest ICD and wires are stable. The cardiomediastinal   contours are stable. The pulmonary vasculature is within normal limits. The lungs and pleural spaces are clear. There is no pneumothorax. There is   stable right hemidiaphragm elevation. The bones and upper abdomen are stable. CT Results  (Last 48 hours)               08/22/17 0221  CT HEAD WO CONT Final result    Impression:  IMPRESSION:   There is no acute intracranial abnormality. Narrative:  EXAM:  CT head without contrast       INDICATION: Syncope. COMPARISON: CT head 3/26/2016       TECHNIQUE: Axial noncontrast head CT from foramen magnum to vertex. Coronal and   sagittal reformatted images were obtained. CT dose reduction was achieved   through use of a standardized protocol tailored for this examination and   automatic exposure control for dose modulation.        FINDINGS:  There is diffuse age-related parenchymal volume loss. The ventricles   and sulci are age-appropriate without hydrocephalus. There is no mass effect or   midline shift. There is no intracranial hemorrhage or extra-axial fluid   collection. There is no significant white matter disease. The gray-white matter   differentiation is maintained. The basal cisterns are patent. The osseous structures are intact. The visualized paranasal sinuses and mastoid   air cells are clear. MEDICATIONS GIVEN:  Medications   sodium chloride (NS) flush 5-10 mL (not administered)   sodium chloride (NS) flush 5-10 mL (not administered)   cefTRIAXone (ROCEPHIN) 1 g in 0.9% sodium chloride (MBP/ADV) 50 mL (0 g IntraVENous IV Completed 8/22/17 0612)       IMPRESSION:  1. Urinary tract infection with hematuria, site unspecified    2. Dizziness    3. Anemia, unspecified type        PLAN:  1. Discharge Medication List as of 8/22/2017  4:49 AM      START taking these medications    Details   cefUROXime (CEFTIN) 500 mg tablet Take 1 Tab by mouth two (2) times a day for 7 days. , Print, Disp-14 Tab, R-0         CONTINUE these medications which have NOT CHANGED    Details   atorvastatin (LIPITOR) 40 mg tablet TAKE 1 TABLET BY MOUTH DAILY, Normal, Disp-90 Tab, R-3      potassium chloride SR (KLOR-CON 10) 10 mEq tablet TAKE 3 TABLETS BY MOUTH EVERY DAY, Normal, Disp-270 Tab, R-0      furosemide (LASIX) 80 mg tablet TAKE 1 TABLET BY MOUTH TWICE DAILY, Normal**Patient requests 90 days supply**Disp-180 Tab, R-4      carvedilol (COREG) 25 mg tablet TAKE 1 TABLET BY MOUTH TWICE DAILY WITH MEALS, Normal, Disp-60 Tab, R-6      OXYGEN-AIR DELIVERY SYSTEMS by Does Not Apply route., Historical Med      lisinopril (PRINIVIL, ZESTRIL) 10 mg tablet TAKE 1 TABLET BY MOUTH DAILY, Normal, Disp-90 Tab, R-3      NEXIUM 40 mg capsule TAKE ONE CAPSULE BY MOUTH DAILY, Mail Order, Disp-30 Cap, R-0      aspirin delayed-release 81 mg tablet Take 81 mg by mouth daily. , Historical Med budesonide (PULMICORT) 180 mcg/actuation aepb inhaler Take 2 puffs by inhalation two (2) times a day., Historical Med      ciprofloxacin (CIPRO) 250 mg tablet Take 125 mg by mouth daily. , Historical Med      latanoprost (XALATAN) 0.005 % ophthalmic solution Administer 1 Drop to both eyes nightly. Historical Med, 1 Drop      cetirizine (ZYRTEC) 10 mg tablet Take 10 mg by mouth daily. Historical Med, 10 mg      albuterol-ipratropium (DUONEB) 2.5 mg-0.5 mg/3 ml nebulizer solution 3 mL by Nebulization route every four (4) hours as needed for Wheezing., Historical Med      fluticasone (FLONASE) 50 mcg/actuation nasal spray 2 sprays by Both Nostrils route daily as needed for Rhinitis., Historical Med      acetaminophen (TYLENOL ARTHRITIS PAIN) 650 mg CR tablet Take 650 mg by mouth every six (6) hours as needed., Historical Med           2. Follow-up Information     Follow up With Details Comments [address] Stacy Pinto MD In 2 days  [facility] [address]  [phone]      [facility] [place] EM[facility] DE[facility]  If symptoms worsen [address] [place] [address]  [phone]        Return to ED if worse     DISCHARGE NOTE:  4:50 AM  The patient's results have been reviewed with family and/or caregiver. They verbally convey their understanding and agreement of the patient's signs, symptoms, diagnosis, treatment, and prognosis. They additionally agree to follow up as recommended in the discharge instructions or to return to the Emergency Room should the patient's condition change prior to their follow-up appointment. The family and/or caregiver verbally agrees with the care-plan and all of their questions have been answered.  The discharge instructions have also been provided to the them along with educational information regarding the patient's diagnosis and a list of reasons why the patient would want to return to the ER prior to their follow-up appointment should their condition change. Written by KIM Gallegos, as dictated by Evens Flores MD.         This note is prepared by Tesfaye Guan, acting as Scribe for MD Lela Noble MD : The scribe's documentation has been prepared under my direction and personally reviewed by me in its entirety. I confirm that the note above accurately reflects all work, treatment, procedures, and medical decision making performed by me. This note will not be viewable in [address]

## 2017-08-22 NOTE — ED NOTES
Pt discharged via wheelchair and accompanied by family. Vitals stable. No c/o pain. IV removed with cath tip intact. Discharge papers given and explained by provider. Pt verbalized understanding.

## 2017-08-24 LAB
BACTERIA SPEC CULT: ABNORMAL
CC UR VC: ABNORMAL
SERVICE CMNT-IMP: ABNORMAL

## 2017-08-25 RX ORDER — CARVEDILOL 25 MG/1
TABLET ORAL
Qty: 60 TAB | Refills: 6 | Status: SHIPPED | OUTPATIENT
Start: 2017-08-25 | End: 2019-01-01

## 2017-09-01 ENCOUNTER — CLINICAL SUPPORT (OUTPATIENT)
Dept: CARDIOLOGY CLINIC | Age: 82
End: 2017-09-01

## 2017-09-01 DIAGNOSIS — R07.89 CHEST TIGHTNESS OR PRESSURE: Primary | ICD-10-CM

## 2017-09-14 ENCOUNTER — OFFICE VISIT (OUTPATIENT)
Dept: CARDIOLOGY CLINIC | Age: 82
End: 2017-09-14

## 2017-09-14 ENCOUNTER — CLINICAL SUPPORT (OUTPATIENT)
Dept: CARDIOLOGY CLINIC | Age: 82
End: 2017-09-14

## 2017-09-14 VITALS
SYSTOLIC BLOOD PRESSURE: 132 MMHG | RESPIRATION RATE: 18 BRPM | DIASTOLIC BLOOD PRESSURE: 70 MMHG | WEIGHT: 204.8 LBS | OXYGEN SATURATION: 98 % | HEIGHT: 62 IN | BODY MASS INDEX: 37.69 KG/M2 | HEART RATE: 80 BPM

## 2017-09-14 DIAGNOSIS — I50.32 CHRONIC DIASTOLIC HEART FAILURE (HCC): ICD-10-CM

## 2017-09-14 DIAGNOSIS — Z95.810 S/P IMPLANTATION OF AUTOMATIC CARDIOVERTER/DEFIBRILLATOR (AICD): Primary | ICD-10-CM

## 2017-09-14 DIAGNOSIS — I10 ESSENTIAL HYPERTENSION: Primary | Chronic | ICD-10-CM

## 2017-09-14 DIAGNOSIS — E78.5 HYPERLIPIDEMIA, UNSPECIFIED HYPERLIPIDEMIA TYPE: ICD-10-CM

## 2017-09-14 DIAGNOSIS — I49.5 SICK SINUS SYNDROME (HCC): ICD-10-CM

## 2017-09-14 DIAGNOSIS — Z95.810 S/P IMPLANTATION OF AUTOMATIC CARDIOVERTER/DEFIBRILLATOR (AICD): ICD-10-CM

## 2017-09-14 DIAGNOSIS — I50.43 SYSTOLIC AND DIASTOLIC CHF, ACUTE ON CHRONIC (HCC): ICD-10-CM

## 2017-09-14 NOTE — PROGRESS NOTES
Subjective:      Bianka Hensley is a 80 y.o. female is here for yearly device follow up . She has baseline shortness of breath and is oxygen dependent. The patient denies chest pain, orthopnea, PND, LE edema, palpitations, syncope, presyncope or fatigue.        Patient Active Problem List    Diagnosis Date Noted    Syncope 05/26/2016    S/P implantation of automatic cardioverter/defibrillator (AICD) 01/14/2015    Pulmonary HTN ([address] 17/00/4654    Systolic and diastolic CHF, acute on chronic ([address] 09/03/2014    Acute-on-chronic respiratory failure ([address] 08/27/2014    Hypothyroidism 08/27/2014    Asthma 06/12/2013    Moderate to severe pulmonary hypertension ([address] 06/12/2013    Ischemic colitis ([address] 05/14/2012    Colitis, ischemic ([address] 03/26/2012    Hypokalemia 03/23/2012    Hypomagnesemia 03/23/2012    Rectal bleeding 03/22/2012    Chronic systolic heart failure ([address] 03/08/2012    Cardiac pacemaker in situ 03/08/2012    Cardiomyopathy in other diseases classified elsewhere 03/08/2012    Chronic diastolic heart failure ([address] 03/08/2012    Atrial flutter ([address] 03/08/2012    Coronary atherosclerosis of native coronary artery 03/08/2012    Chest pain 02/25/2012    Sick sinus syndrome ([address] 02/24/2012    HTN (hypertension) 02/24/2012    Hyperlipidemia 02/24/2012      Milka Lee MD  Past Medical History:   Diagnosis Date    Asthma     Autoimmune disease ([address]     lupus    CAD (coronary artery disease)     cardiac cath    CAD (coronary artery disease)     sick sinus syndrome    Essential hypertension     GERD (gastroesophageal reflux disease)     Glaucoma     Heart failure ([address]     HX OTHER MEDICAL     Lupus    Hypertension     Other ill-defined conditions     Pacemaker     PUD (peptic ulcer disease)     S/P implantation of automatic cardioverter/defibrillator (AICD) 1/14/2015    [employer] upgrade to AICD implant    SSS (sick sinus syndrome) ([address]       Past Surgical History:   Procedure Laterality Date    CARDIAC SURG PROCEDURE UNLIST      pacemaker/defibrilator.  HX GYN      BTL    HX PACEMAKER      GA COLONOSCOPY W/BIOPSY SINGLE/MULTIPLE  3/26/2012         GA COLSC FLX W/RMVL OF TUMOR POLYP LESION SNARE TQ  3/26/2012          Allergies   Allergen Reactions    Contrast Agent [Iodine] Anaphylaxis     Made aware of allergy 1/4/13    Sulfa (Sulfonamide Antibiotics) Hives    Codeine Anxiety    Pcn [Penicillins] Anaphylaxis     Reaction was to penicillin injections into buttocks. She can take oral amoxicillin      Family History   Problem Relation Age of Onset    Arrhythmia Mother     Hypertension Mother     Hypertension Father     negative for cardiac disease  Social History     Social History    Marital status: SINGLE     Spouse name: N/A    Number of children: N/A    Years of education: N/A     Social History Main Topics    Smoking status: Never Smoker    Smokeless tobacco: Never Used    Alcohol use No    Drug use: No    Sexual activity: Not Asked     Other Topics Concern    None     Social History Narrative     Current Outpatient Prescriptions   Medication Sig    carvedilol (COREG) 25 mg tablet TAKE 1 TABLET BY MOUTH TWICE DAILY WITH MEALS    atorvastatin (LIPITOR) 40 mg tablet TAKE 1 TABLET BY MOUTH DAILY    potassium chloride SR (KLOR-CON 10) 10 mEq tablet TAKE 3 TABLETS BY MOUTH EVERY DAY    furosemide (LASIX) 80 mg tablet TAKE 1 TABLET BY MOUTH TWICE DAILY    OXYGEN-AIR DELIVERY SYSTEMS by Does Not Apply route.  lisinopril (PRINIVIL, ZESTRIL) 10 mg tablet TAKE 1 TABLET BY MOUTH DAILY    NEXIUM 40 mg capsule TAKE ONE CAPSULE BY MOUTH DAILY    albuterol-ipratropium (DUONEB) 2.5 mg-0.5 mg/3 ml nebulizer solution 3 mL by Nebulization route every four (4) hours as needed for Wheezing.  aspirin delayed-release 81 mg tablet Take 81 mg by mouth daily.     budesonide (PULMICORT) 180 mcg/actuation aepb inhaler Take 2 puffs by inhalation two (2) times a day.  fluticasone (FLONASE) 50 mcg/actuation nasal spray 2 sprays by Both Nostrils route daily as needed for Rhinitis.  ciprofloxacin (CIPRO) 250 mg tablet Take 125 mg by mouth daily.  acetaminophen (TYLENOL ARTHRITIS PAIN) 650 mg CR tablet Take 650 mg by mouth every six (6) hours as needed.  latanoprost (XALATAN) 0.005 % ophthalmic solution Administer 1 Drop to both eyes nightly.  cetirizine (ZYRTEC) 10 mg tablet Take 10 mg by mouth daily. No current facility-administered medications for this visit. Facility-Administered Medications Ordered in Other Visits   Medication Dose Route Frequency    ADDaptor        vancomycin (VANCOCIN) 1,000 mg injection        0.9% sodium chloride (MBP/ADV) 0.9 % infusion        bacitracin 50,000 unit injection          Vitals:    09/14/17 1346   BP: 132/70   Pulse: 80   Resp: 18   SpO2: 98%   Weight: 204 lb 12.8 oz (92.9 kg)   Height: 5' 2\" (1.575 m)       I have reviewed the nurses notes, vitals, problem list, allergy list, medical history, family, social history and medications. Review of Symptoms:    General: Pt denies excessive weight gain or loss. Pt is able to conduct ADL's  HEENT: Denies blurred vision, headaches, epistaxis and difficulty swallowing. Respiratory: +shortness of breath, Denies wheezing or stridor. Cardiovascular: Denies precordial pain, palpitations, edema or PND  Gastrointestinal: Denies poor appetite, indigestion, abdominal pain or blood in stool  Urinary: Denies dysuria, pyuria  Musculoskeletal: Denies pain or swelling from muscles or joints  Neurologic: Denies tremor, paresthesias, or sensory motor disturbance  Skin: Denies rash, itching or texture change. Psych: Denies depression      Physical Exam:      General: Well developed, in no acute distress. HEENT: Eyes - PERRL, no jvd  Heart:  Normal S1/S2 negative S3 or S4.  Regular, no murmur, gallop or rub.   Respiratory: +oxygen dependent, diminshed bilaterally x 4, no wheezing or rales  Abdomen:   Soft, non-tender, bowel sounds are active.   Extremities:  No edema, normal cap refill, no cyanosis. Musculoskeletal: No clubbing  Neuro: A&Ox3, speech clear, gait stable. Skin: Skin color is normal. No rashes or lesions. Non diaphoretic  Vascular: 2+ pulses symmetric in all extremities    Cardiographics    Ekg: normal sinus rhythm   BSC ICD: 1% AP, <1% RVP  Battery: 10 years    Results for orders placed or performed during the hospital encounter of 08/22/17   EKG, 12 LEAD, INITIAL   Result Value Ref Range    Ventricular Rate 84 BPM    Atrial Rate 84 BPM    P-R Interval 178 ms    QRS Duration 118 ms    Q-T Interval 412 ms    QTC Calculation (Bezet) 486 ms    Calculated P Axis 38 degrees    Calculated R Axis -33 degrees    Calculated T Axis 69 degrees    Diagnosis       Sinus rhythm with marked sinus arrhythmia with occasional  Left axis deviation  Left ventricular hypertrophy with QRS widening    Confirmed by Rena Nagy ([id number]) on 8/22/2017 8:02:06 AM           Lab Results   Component Value Date/Time    WBC 7.9 08/22/2017 01:46 AM    HGB 9.4 08/22/2017 01:46 AM    HCT 31.0 08/22/2017 01:46 AM    PLATELET 360 86/72/4723 01:46 AM    MCV 90.1 08/22/2017 01:46 AM      Lab Results   Component Value Date/Time    Sodium 140 08/22/2017 01:46 AM    Potassium 3.4 08/22/2017 01:46 AM    Chloride 100 08/22/2017 01:46 AM    CO2 39 08/22/2017 01:46 AM    Anion gap 1 08/22/2017 01:46 AM    Glucose 139 08/22/2017 01:46 AM    BUN 15 08/22/2017 01:46 AM    Creatinine 1.08 08/22/2017 01:46 AM    BUN/Creatinine ratio 14 08/22/2017 01:46 AM    GFR est AA 59 08/22/2017 01:46 AM    GFR est non-AA 48 08/22/2017 01:46 AM    Calcium 8.5 08/22/2017 01:46 AM    Bilirubin, total 0.5 08/22/2017 01:46 AM    AST (SGOT) 14 08/22/2017 01:46 AM    Alk.  phosphatase 89 08/22/2017 01:46 AM    Protein, total 7.9 08/22/2017 01:46 AM    Albumin 3.4 08/22/2017 01:46 AM    Globulin 4.5 08/22/2017 01:46 AM    A-G Ratio 0.8 08/22/2017 01:46 AM    ALT (SGPT) 13 08/22/2017 01:46 AM         Assessment:     Assessment:        ICD-10-CM ICD-9-CM    1. Essential hypertension I10 401.9 AMB POC EKG ROUTINE W/ 12 LEADS, INTER & REP   2. Systolic and diastolic CHF, acute on chronic (HCC) I50.43 428.43      428.0    3. Sick sinus syndrome (HCC) I49.5 427.81    4. Hyperlipidemia, unspecified hyperlipidemia type E78.5 272.4    5. S/P implantation of automatic cardioverter/defibrillator (AICD) Z95.810 V45.02      Orders Placed This Encounter    AMB POC EKG ROUTINE W/ 12 LEADS, INTER & REP     Order Specific Question:   Reason for Exam:     Answer:   routine        Plan:   Ms. Erick Gudino is here for annual device follow up. She is doing well. She reports baseline shortness of breath and is oxygen dependent. EKG demonstrates normal sinus rhythm. Device interrogation today demonstrates normal functioning without episodes. She will continue per device clinic and follow up with Dr. Tahir Soto in one year. Continue medical management for HTN, CHF, hyperlipidemia. Thank you for allowing me to participate in Emily Bruner 's care.     Fran Mahmood NP

## 2017-09-14 NOTE — MR AVS SNAPSHOT
Visit Information Date & Time Provider Department Dept. Phone Encounter #  
 9/14/2017  2:00 PM Vaughn Douglas, [address] [facility] [phone] [id number] Your Appointments 9/28/2017 10:45 AM  
6 MONTH with Ori Perla MD  
[facility]) Appt Note: Per Dr Piter Chen $0CP REM  
 [address]  
[phone] [address]  
  
    
  
 10/4/2017  8:00 AM  
REMOTE OFFICE VISIT with [facility]) Appt Note: NOT AN OFFICE VISIT - REMOTE BSC ICD  
 [address]  
[phone] [address] Upcoming Health Maintenance Date Due  
 FOOT EXAM Q1 5/16/1944 MICROALBUMIN Q1 5/16/1944 EYE EXAM RETINAL OR DILATED Q1 5/16/1944 DTaP/Tdap/Td series (1 - Tdap) 5/16/1955 ZOSTER VACCINE AGE 60> 3/16/1994 GLAUCOMA SCREENING Q2Y 5/16/1999 OSTEOPOROSIS SCREENING (DEXA) 5/16/1999 MEDICARE YEARLY EXAM 5/16/1999 HEMOGLOBIN A1C Q6M 7/19/2015 Pneumococcal 65+ Low/Medium Risk (2 of 2 - PPSV23) 2/25/2017 INFLUENZA AGE 9 TO ADULT 8/1/2017 LIPID PANEL Q1 4/27/2018 Allergies as of 9/14/2017  Review Complete On: 9/14/2017 By: Nelly Frederick LPN Severity Noted Reaction Type Reactions Contrast Agent [Iodine] High 01/04/2013   Systemic Anaphylaxis Made aware of allergy 1/4/13 Sulfa (Sulfonamide Antibiotics) High 02/24/2012   Side Effect Hives Codeine  03/01/2012    Anxiety Pcn [Penicillins]  04/22/2011   Intolerance Anaphylaxis Reaction was to penicillin injections into buttocks. She can take oral amoxicillin Current Immunizations  Reviewed on 8/27/2014 Name Date Influenza Vaccine 10/1/2014 Influenza Vaccine Split 9/5/2011  ZZZ-RETIRED (DO NOT USE) Pneumococcal Vaccine (Unspecified Type) 2/25/2012  3:47 PM  
  
 Not reviewed this visit You Were Diagnosed With   
  
 Codes Comments Essential hypertension    -  Primary ICD-10-CM: I10 
ICD-9-CM: 401.9 Vitals BP Pulse Resp Height(growth percentile) Weight(growth percentile) SpO2  
 132/70 (BP 1 Location: Left arm, BP Patient Position: Sitting) 80 18 5' 2\" (1.575 m) 204 lb 12.8 oz (92.9 kg) 98% BMI OB Status Smoking Status 37.46 kg/m2 Postmenopausal Never Smoker Vitals History BMI and BSA Data Body Mass Index Body Surface Area  
 37.46 kg/m 2 2.02 m 2 Preferred Pharmacy Pharmacy Name Phone [facility]  [place] [phone] Your Updated Medication List  
  
   
This list is accurate as of: 9/14/17  2:35 PM.  Always use your most recent med list.  
  
  
  
  
 aspirin delayed-release 81 mg tablet Take 81 mg by mouth daily. atorvastatin 40 mg tablet Commonly known as:  LIPITOR  
TAKE 1 TABLET BY MOUTH DAILY  
  
 budesonide 180 mcg/actuation Aepb inhaler Commonly known as:  PULMICORT Take 2 puffs by inhalation two (2) times a day. carvedilol 25 mg tablet Commonly known as:  COREG  
TAKE 1 TABLET BY MOUTH TWICE DAILY WITH MEALS  
  
 ciprofloxacin HCl 250 mg tablet Commonly known as:  CIPRO Take 125 mg by mouth daily. DUONEB 2.5 mg-0.5 mg/3 ml Nebu Generic drug:  albuterol-ipratropium 3 mL by Nebulization route every four (4) hours as needed for Wheezing. FLONASE 50 mcg/actuation nasal spray Generic drug:  fluticasone 2 sprays by Both Nostrils route daily as needed for Rhinitis. furosemide 80 mg tablet Commonly known as:  LASIX TAKE 1 TABLET BY MOUTH TWICE DAILY  
  
 latanoprost 0.005 % ophthalmic solution Commonly known as:  Clifm Chuck Administer 1 Drop to both eyes nightly. lisinopril 10 mg tablet Commonly known as:  Valene Pencil  
 TAKE 1 TABLET BY MOUTH DAILY NexIUM 40 mg capsule Generic drug:  esomeprazole TAKE ONE CAPSULE BY MOUTH DAILY OXYGEN-AIR DELIVERY SYSTEMS  
by Does Not Apply route. potassium chloride SR 10 mEq tablet Commonly known as:  KLOR-CON 10  
TAKE 3 TABLETS BY MOUTH EVERY DAY  
  
 TYLENOL ARTHRITIS PAIN 650 mg CR tablet Generic drug:  acetaminophen Take 650 mg by mouth every six (6) hours as needed. ZyrTEC 10 mg tablet Generic drug:  cetirizine Take 10 mg by mouth daily. We Performed the Following AMB POC EKG ROUTINE W/ 12 LEADS, INTER & REP [24936 CPT(R)] Introducing [facility] SERVICES! Samaria Peña introduces [employer] patient portal. Now you can access parts of your medical record, email your doctor's office, and request medication refills online. 1. In your internet browser, go to [email or web address] 2. Click on the First Time User? Click Here link in the Sign In box. You will see the New Member Sign Up page. 3. Enter your [employer] Access Code exactly as it appears below. You will not need to use this code after youve completed the sign-up process. If you do not sign up before the expiration date, you must request a new code. · [employer] Access Code: [email or web address] Expires: 10/3/2017  9:36 AM 
 
4. Enter the last four digits of your Social Security Number (xxxx) and Date of Birth (mm/dd/yyyy) as indicated and click Submit. You will be taken to the next sign-up page. 5. Create a [employer] ID. This will be your [employer] login ID and cannot be changed, so think of one that is secure and easy to remember. 6. Create a [employer] password. You can change your password at any time. 7. Enter your Password Reset Question and Answer. This can be used at a later time if you forget your password. 8. Enter your e-mail address. You will receive e-mail notification when new information is available in [address] 9. Click Sign Up. You can now view and download portions of your medical record. 10. Click the Download Summary menu link to download a portable copy of your medical information. If you have questions, please visit the Frequently Asked Questions section of the [employer] website. Remember, [employer] is NOT to be used for urgent needs. For medical emergencies, dial 911. Now available from your iPhone and Android! Please provide this summary of care documentation to your next provider. Your primary care clinician is listed as Nidhi Hobbs. If you have any questions after today's visit, please call [phone].

## 2017-12-01 ENCOUNTER — OFFICE VISIT (OUTPATIENT)
Dept: CARDIOLOGY CLINIC | Age: 82
End: 2017-12-01

## 2017-12-01 VITALS
OXYGEN SATURATION: 99 % | HEART RATE: 80 BPM | DIASTOLIC BLOOD PRESSURE: 70 MMHG | HEIGHT: 62 IN | SYSTOLIC BLOOD PRESSURE: 150 MMHG | WEIGHT: 206.6 LBS | RESPIRATION RATE: 16 BRPM | BODY MASS INDEX: 38.02 KG/M2

## 2017-12-01 DIAGNOSIS — E78.2 MIXED HYPERLIPIDEMIA: ICD-10-CM

## 2017-12-01 DIAGNOSIS — Z95.810 S/P IMPLANTATION OF AUTOMATIC CARDIOVERTER/DEFIBRILLATOR (AICD): ICD-10-CM

## 2017-12-01 DIAGNOSIS — R06.02 SHORTNESS OF BREATH: Primary | ICD-10-CM

## 2017-12-01 DIAGNOSIS — I25.10 ATHEROSCLEROSIS OF NATIVE CORONARY ARTERY OF NATIVE HEART WITHOUT ANGINA PECTORIS: ICD-10-CM

## 2017-12-01 DIAGNOSIS — I49.5 SICK SINUS SYNDROME (HCC): ICD-10-CM

## 2017-12-01 DIAGNOSIS — I10 ESSENTIAL HYPERTENSION: Chronic | ICD-10-CM

## 2017-12-01 DIAGNOSIS — I50.43 SYSTOLIC AND DIASTOLIC CHF, ACUTE ON CHRONIC (HCC): ICD-10-CM

## 2017-12-01 NOTE — PROGRESS NOTES
[address]  [phone]     Subjective:      Nino Pal is a 80 y.o. female is here for routine f/u. The patient has persistent shortness of breath on home oxygen. Denies chest pain/ orthopnea, PND, LE edema, palpitations, syncope, or presyncope.        Patient Active Problem List    Diagnosis Date Noted    Syncope 05/26/2016    S/P implantation of automatic cardioverter/defibrillator (AICD) 01/14/2015    Pulmonary HTN 62/08/8162    Systolic and diastolic CHF, acute on chronic ([address] 09/03/2014    Acute-on-chronic respiratory failure ([address] 08/27/2014    Hypothyroidism 08/27/2014    Asthma 06/12/2013    Moderate to severe pulmonary hypertension 06/12/2013    Ischemic colitis ([address] 05/14/2012    Colitis, ischemic ([address] 03/26/2012    Hypokalemia 03/23/2012    Hypomagnesemia 03/23/2012    Rectal bleeding 03/22/2012    Chronic systolic heart failure ([address] 03/08/2012    Cardiac pacemaker in situ 03/08/2012    Cardiomyopathy in other diseases classified elsewhere 03/08/2012    Chronic diastolic heart failure ([address] 03/08/2012    Atrial flutter ([address] 03/08/2012    Coronary atherosclerosis of native coronary artery 03/08/2012    Chest pain 02/25/2012    Sick sinus syndrome ([address] 02/24/2012    HTN (hypertension) 02/24/2012    Hyperlipidemia 02/24/2012      Magali Dickens MD  Past Medical History:   Diagnosis Date    Asthma     Autoimmune disease ([address]     lupus    CAD (coronary artery disease)     cardiac cath    CAD (coronary artery disease)     sick sinus syndrome    Essential hypertension     GERD (gastroesophageal reflux disease)     Glaucoma     Heart failure (HCC)     HX OTHER MEDICAL     Lupus    Hypertension     Other ill-defined conditions(799.89)     Pacemaker     PUD (peptic ulcer disease)     S/P implantation of automatic cardioverter/defibrillator (AICD) 1/14/2015    Vivastream upgrade to AICD implant    SSS (sick sinus syndrome) [facility])       Past Surgical History:   Procedure Laterality Date    CARDIAC SURG PROCEDURE UNLIST      pacemaker/defibrilator.  HX GYN      BTL    HX PACEMAKER      WY COLONOSCOPY W/BIOPSY SINGLE/MULTIPLE  3/26/2012         [place]Y COLSC FLX W/RMVL OF TUMOR POLYP LESION SNARE TQ  3/26/2012          Allergies   Allergen Reactions    Contrast Agent [Iodine] Anaphylaxis     Made aware of allergy 1/4/13    Sulfa (Sulfonamide Antibiotics) Hives    Codeine Anxiety    Pcn [Penicillins] Anaphylaxis     Reaction was to penicillin injections into buttocks. She can take oral amoxicillin      Family History   Problem Relation Age of Onset    Arrhythmia Mother     Hypertension Mother     Hypertension Father       Social History     Social History    Marital status: SINGLE     Spouse name: N/A    Number of children: N/A    Years of education: N/A     Occupational History    Not on file. Social History Main Topics    Smoking status: Never Smoker    Smokeless tobacco: Never Used    Alcohol use No    Drug use: No    Sexual activity: Not on file     Other Topics Concern    Not on file     Social History Narrative      Current Outpatient Prescriptions   Medication Sig    carvedilol (COREG) 25 mg tablet TAKE 1 TABLET BY MOUTH TWICE DAILY WITH MEALS    atorvastatin (LIPITOR) 40 mg tablet TAKE 1 TABLET BY MOUTH DAILY    potassium chloride SR (KLOR-CON 10) 10 mEq tablet TAKE 3 TABLETS BY MOUTH EVERY DAY    furosemide (LASIX) 80 mg tablet TAKE 1 TABLET BY MOUTH TWICE DAILY    OXYGEN-AIR DELIVERY SYSTEMS by Does Not Apply route.  lisinopril (PRINIVIL, ZESTRIL) 10 mg tablet TAKE 1 TABLET BY MOUTH DAILY    NEXIUM 40 mg capsule TAKE ONE CAPSULE BY MOUTH DAILY    albuterol-ipratropium (DUONEB) 2.5 mg-0.5 mg/3 ml nebulizer solution 3 mL by Nebulization route every four (4) hours as needed for Wheezing.  aspirin delayed-release 81 mg tablet Take 81 mg by mouth daily.     budesonide (PULMICORT) 180 mcg/actuation aepb inhaler Take 2 puffs by inhalation two (2) times a day.  fluticasone (FLONASE) 50 mcg/actuation nasal spray 2 sprays by Both Nostrils route daily as needed for Rhinitis.  ciprofloxacin (CIPRO) 250 mg tablet Take 125 mg by mouth daily.  acetaminophen (TYLENOL ARTHRITIS PAIN) 650 mg CR tablet Take 650 mg by mouth every six (6) hours as needed.  latanoprost (XALATAN) 0.005 % ophthalmic solution Administer 1 Drop to both eyes nightly.  cetirizine (ZYRTEC) 10 mg tablet Take 10 mg by mouth daily. No current facility-administered medications for this visit. Facility-Administered Medications Ordered in Other Visits   Medication Dose Route Frequency    ADDaptor        vancomycin (VANCOCIN) 1,000 mg injection        0.9% sodium chloride (MBP/ADV) 0.9 % infusion        bacitracin 50,000 unit injection             Review of Symptoms:  11 systems reviewed, negative other than as stated in the HPI    Physical ExamPhysical Exam:    Vitals:    12/01/17 1021 12/01/17 1022   BP: 140/70 150/70   Pulse: 80    Resp: 16    SpO2: 99%    Weight: 206 lb 9.6 oz (93.7 kg)    Height: 5' 2\" (1.575 m)      Body mass index is 37.79 kg/(m^2). General PE   Gen:  NAD  Mental Status - Alert. General Appearance - Not in acute distress. Chest and Lung Exam   Inspection: Accessory muscles - No use of accessory muscles in breathing. Auscultation:   Breath sounds: - Normal.   Cardiovascular   Inspection: Jugular vein - Bilateral - Inspection Normal.   Palpation/Percussion:   Apical Impulse: - Normal.   Auscultation: Rhythm - Regular. Heart Sounds - S1 WNL and S2 WNL. No S3 or S4. Murmurs & Other Heart Sounds: Auscultation of the heart reveals - No Murmurs. Peripheral Vascular   Upper Extremity: Inspection - Bilateral - No Cyanotic nailbeds or Digital clubbing. Lower Extremity:   Palpation: Edema - Bilateral - No edema. Abdomen:   Soft, non-tender, bowel sounds are active.   Neuro: A&O times 3, CN and motor grossly WNL    Labs:   Lab Results   Component Value Date/Time    Cholesterol, total 172 04/27/2017 08:58 AM    Cholesterol, total 124 12/12/2014 08:51 AM    Cholesterol, Total 184 01/02/2013 11:40 AM    HDL Cholesterol 39 04/27/2017 08:58 AM    HDL Cholesterol 45 12/12/2014 08:51 AM    HDL Cholesterol 44 01/02/2013 11:40 AM    LDL Cholesterol 115 01/02/2013 11:40 AM    LDL, calculated 112 04/27/2017 08:58 AM    LDL, calculated 64 12/12/2014 08:51 AM    Triglyceride 107 04/27/2017 08:58 AM    Triglyceride 75 12/12/2014 08:51 AM    Triglycerides 163 01/02/2013 11:40 AM     Lab Results   Component Value Date/Time    CK 77 08/22/2017 01:46 AM     Lab Results   Component Value Date/Time    Sodium 140 08/22/2017 01:46 AM    Potassium 3.4 08/22/2017 01:46 AM    Chloride 100 08/22/2017 01:46 AM    CO2 39 08/22/2017 01:46 AM    Anion gap 1 08/22/2017 01:46 AM    Glucose 139 08/22/2017 01:46 AM    BUN 15 08/22/2017 01:46 AM    Creatinine 1.08 08/22/2017 01:46 AM    BUN/Creatinine ratio 14 08/22/2017 01:46 AM    GFR est AA 59 08/22/2017 01:46 AM    GFR est non-AA 48 08/22/2017 01:46 AM    Calcium 8.5 08/22/2017 01:46 AM    Bilirubin, total 0.5 08/22/2017 01:46 AM    AST (SGOT) 14 08/22/2017 01:46 AM    Alk. phosphatase 89 08/22/2017 01:46 AM    Protein, total 7.9 08/22/2017 01:46 AM    Albumin 3.4 08/22/2017 01:46 AM    Globulin 4.5 08/22/2017 01:46 AM    A-G Ratio 0.8 08/22/2017 01:46 AM    ALT (SGPT) 13 08/22/2017 01:46 AM       EKG:  NSR      Assessment:        1. Shortness of breath    2. Essential hypertension    3. Systolic and diastolic CHF, acute on chronic ([address]    4. Atherosclerosis of native coronary artery of native heart without angina pectoris    5. S/P implantation of automatic cardioverter/defibrillator (AICD)    6. Sick sinus syndrome ([address]    7.  Mixed hyperlipidemia        Orders Placed This Encounter    CK    LIPID PANEL    METABOLIC PANEL, COMPREHENSIVE    STRESS TEST LEXISCAN/CARDIOLITE     Standing Status:   Future     Standing Expiration Date:   6/1/2018     Order Specific Question:   Reason for Exam:     Answer:   CP    2D ECHO COMPLETE ADULT (TTE) W OR WO CONTR     Standing Status:   Future     Standing Expiration Date:   5/31/2018     Order Specific Question:   Reason for Exam:     Answer:   HF        Plan:       Patient is a 80-year-old female who at last visit had presented to [facility] with chest pain. Korina Flies was ordered at that time, but she chose not to have it done. Now she is willing to have it done. Will rule out ischemia with Lexiscan Myoview stress test.  Had 60% LAD lesion 2014. 2.  Hypertension: Controlled at home. Continue current medication  3. Hyperlipidemia: Continue statin therapy. Due for repeat lipids on increased Lipitor. Ordered today. 4.  History of sick sinus syndrome: Has pacemaker/ICD, followed regularly by Dr. Olvin Sarkar.  5.  Systolic and diastolic heart failure: Ejection fraction 40-45% in 2452-[id number] diastolic heart failure continue current dose of furosemide. Repeat echo now for EF and also for pulmonary hypertension. Follow-up in 6 months if no concerns on testing.     Oran Lennox, MD

## 2017-12-01 NOTE — MR AVS SNAPSHOT
Visit Information Date & Time Provider Department Dept. Phone Encounter #  
 12/1/2017 10:15 AM Oran Lennox, MD [facility] [phone] [id number]  
  
 1/3/2018  8:00 AM  
REMOTE OFFICE VISIT with [facility]) Appt Note: NOT AN OFFICE VISIT - REMOTE BSC ICD  
 [address]  
[phone] [address] Upcoming Health Maintenance Date Due  
 FOOT EXAM Q1 5/16/1944 MICROALBUMIN Q1 5/16/1944 EYE EXAM RETINAL OR DILATED Q1 5/16/1944 DTaP/Tdap/Td series (1 - Tdap) 5/16/1955 ZOSTER VACCINE AGE 60> 3/16/1994 GLAUCOMA SCREENING Q2Y 5/16/1999 OSTEOPOROSIS SCREENING (DEXA) 5/16/1999 MEDICARE YEARLY EXAM 5/16/1999 HEMOGLOBIN A1C Q6M 7/19/2015 Pneumococcal 65+ Low/Medium Risk (2 of 2 - PPSV23) 2/25/2017 Influenza Age 5 to Adult 8/1/2017 LIPID PANEL Q1 4/27/2018 Allergies as of 12/1/2017  Review Complete On: 12/1/2017 By: Oran Lennox, MD  
  
 Severity Noted Reaction Type Reactions Contrast Agent [Iodine] High 01/04/2013   Systemic Anaphylaxis Made aware of allergy 1/4/13 Sulfa (Sulfonamide Antibiotics) High 02/24/2012   Side Effect Hives Codeine  03/01/2012    Anxiety Pcn [Penicillins]  04/22/2011   Intolerance Anaphylaxis Reaction was to penicillin injections into buttocks. She can take oral amoxicillin Current Immunizations  Reviewed on 8/27/2014 Name Date Influenza Vaccine 10/1/2014 Influenza Vaccine Split 9/5/2011 ZZZ-RETIRED (DO NOT USE) Pneumococcal Vaccine (Unspecified Type) 2/25/2012  3:47 PM  
  
 Not reviewed this visit You Were Diagnosed With   
  
 Codes Comments Shortness of breath    -  Primary ICD-10-CM: R06.02 
ICD-9-CM: 786.05 Essential hypertension     ICD-10-CM: I10 
ICD-9-CM: 401.9 Systolic and diastolic CHF, acute on chronic (HCC)     ICD-10-CM: I50.43 ICD-9-CM: 428.43, 428.0 Atherosclerosis of native coronary artery of native heart without angina pectoris     ICD-10-CM: I25.10 ICD-9-CM: 414.01   
 S/P implantation of automatic cardioverter/defibrillator (AICD)     ICD-10-CM: Z95.810 ICD-9-CM: V45.02 Sick sinus syndrome (HCC)     ICD-10-CM: I49.5 ICD-9-CM: 427.81 Mixed hyperlipidemia     ICD-10-CM: E78.2 ICD-9-CM: 272.2 Vitals BP Pulse Resp Height(growth percentile) Weight(growth percentile) SpO2  
 150/70 (BP 1 Location: Right arm, BP Patient Position: Sitting) 80 16 5' 2\" (1.575 m) 206 lb 9.6 oz (93.7 kg) 99% BMI OB Status Smoking Status 37.79 kg/m2 Postmenopausal Never Smoker Vitals History BMI and BSA Data Body Mass Index Body Surface Area  
 37.79 kg/m 2 2.02 m 2 Preferred Pharmacy Pharmacy Name Phone [facility] [address]  [place] [phone] Your Updated Medication List  
  
   
This list is accurate as of: 12/1/17 11:07 AM.  Always use your most recent med list.  
  
  
  
  
 aspirin delayed-release 81 mg tablet Take 81 mg by mouth daily. atorvastatin 40 mg tablet Commonly known as:  LIPITOR  
TAKE 1 TABLET BY MOUTH DAILY  
  
 budesonide 180 mcg/actuation Aepb inhaler Commonly known as:  PULMICORT Take 2 puffs by inhalation two (2) times a day. carvedilol 25 mg tablet Commonly known as:  COREG  
TAKE 1 TABLET BY MOUTH TWICE DAILY WITH MEALS  
  
 ciprofloxacin HCl 250 mg tablet Commonly known as:  CIPRO Take 125 mg by mouth daily. DUONEB 2.5 mg-0.5 mg/3 ml Nebu Generic drug:  albuterol-ipratropium 3 mL by Nebulization route every four (4) hours as needed for Wheezing. FLONASE 50 mcg/actuation nasal spray Generic drug:  fluticasone 2 sprays by Both Nostrils route daily as needed for Rhinitis. furosemide 80 mg tablet Commonly known as:  LASIX TAKE 1 TABLET BY MOUTH TWICE DAILY  
  
 latanoprost 0.005 % ophthalmic solution Commonly known as:  Richardine Solum Administer 1 Drop to both eyes nightly. lisinopril 10 mg tablet Commonly known as:  PRINIVIL, ZESTRIL  
TAKE 1 TABLET BY MOUTH DAILY NexIUM 40 mg capsule Generic drug:  esomeprazole TAKE ONE CAPSULE BY MOUTH DAILY OXYGEN-AIR DELIVERY SYSTEMS  
by Does Not Apply route. potassium chloride SR 10 mEq tablet Commonly known as:  KLOR-CON 10  
TAKE 3 TABLETS BY MOUTH EVERY DAY  
  
 TYLENOL ARTHRITIS PAIN 650 mg Tber Generic drug:  acetaminophen Take 650 mg by mouth every six (6) hours as needed. ZyrTEC 10 mg tablet Generic drug:  cetirizine Take 10 mg by mouth daily. We Performed the Following CK [id number] CPT(R)] LIPID PANEL [10699 CPT(R)] METABOLIC PANEL, COMPREHENSIVE [12234 CPT(R)] To-Do List   
 12/01/2017 ECHO:  2D ECHO COMPLETE ADULT (TTE) W OR WO CONTR   
  
 12/01/2017 ECG:  STRESS TEST LEXISCAN/CARDIOLITE Introducing [facility]! [facility] introduces [address] patient portal. Now you can access parts of your medical record, email your doctor's office, and request medication refills online. 1. In your internet browser, go to [email or web address] 2. Click on the First Time User? Click Here link in the Sign In box. You will see the New Member Sign Up page. 3. Enter your [address] Access Code exactly as it appears below. You will not need to use this code after youve completed the sign-up process. If you do not sign up before the expiration date, you must request a new code. · [address] Access Code: [email or web address] Expires: 1/2/2018 10:36 AM 
 
4.  Enter the last four digits of your Social Security Number (xxxx) and Date of Birth (mm/dd/yyyy) as indicated and click Submit. You will be taken to the next sign-up page. 5. Create a [employer] ID. This will be your [employer] login ID and cannot be changed, so think of one that is secure and easy to remember. 6. Create a [employer] password. You can change your password at any time. 7. Enter your Password Reset Question and Answer. This can be used at a later time if you forget your password. 8. Enter your e-mail address. You will receive e-mail notification when new information is available in [address] 9. Click Sign Up. You can now view and download portions of your medical record. 10. Click the Download Summary menu link to download a portable copy of your medical information. If you have questions, please visit the Frequently Asked Questions section of the [employer] website. Remember, [employer] is NOT to be used for urgent needs. For medical emergencies, dial 911. Now available from your iPhone and Android! Please provide this summary of care documentation to your next provider. Your primary care clinician is listed as Sharifa Delgado. If you have any questions after today's visit, please call [phone].

## 2017-12-01 NOTE — PROGRESS NOTES
1. Have you been to the ER, urgent care clinic since your last visit? Hospitalized since your last visit? No    2. Have you seen or consulted any other health care providers outside of the [address] [place] [address] since your last visit? Include any pap smears or colon screening. No     Patient has no cardiac complaints.

## 2018-01-01 ENCOUNTER — HOME CARE VISIT (OUTPATIENT)
Dept: SCHEDULING | Facility: HOME HEALTH | Age: 83
End: 2018-01-01

## 2018-01-01 ENCOUNTER — OFFICE VISIT (OUTPATIENT)
Dept: CARDIOLOGY CLINIC | Age: 83
End: 2018-01-01

## 2018-01-01 ENCOUNTER — CLINICAL SUPPORT (OUTPATIENT)
Dept: CARDIOLOGY CLINIC | Age: 83
End: 2018-01-01

## 2018-01-01 VITALS
OXYGEN SATURATION: 100 % | SYSTOLIC BLOOD PRESSURE: 110 MMHG | RESPIRATION RATE: 16 BRPM | HEART RATE: 66 BPM | DIASTOLIC BLOOD PRESSURE: 60 MMHG | WEIGHT: 199.9 LBS | HEIGHT: 61 IN | BODY MASS INDEX: 37.74 KG/M2

## 2018-01-01 DIAGNOSIS — I48.0 PAROXYSMAL ATRIAL FIBRILLATION (HCC): ICD-10-CM

## 2018-01-01 DIAGNOSIS — Z95.810 S/P IMPLANTATION OF AUTOMATIC CARDIOVERTER/DEFIBRILLATOR (AICD): ICD-10-CM

## 2018-01-01 DIAGNOSIS — I49.5 SICK SINUS SYNDROME (HCC): ICD-10-CM

## 2018-01-01 DIAGNOSIS — Z95.810 S/P IMPLANTATION OF AUTOMATIC CARDIOVERTER/DEFIBRILLATOR (AICD): Primary | ICD-10-CM

## 2018-01-01 DIAGNOSIS — I10 ESSENTIAL HYPERTENSION: ICD-10-CM

## 2018-01-01 DIAGNOSIS — I25.10 ATHEROSCLEROSIS OF NATIVE CORONARY ARTERY OF NATIVE HEART WITHOUT ANGINA PECTORIS: ICD-10-CM

## 2018-01-01 DIAGNOSIS — I50.22 CHRONIC SYSTOLIC HEART FAILURE (HCC): Primary | ICD-10-CM

## 2018-01-01 DIAGNOSIS — I50.43 SYSTOLIC AND DIASTOLIC CHF, ACUTE ON CHRONIC (HCC): ICD-10-CM

## 2018-01-01 DIAGNOSIS — I50.32 CHRONIC DIASTOLIC HEART FAILURE (HCC): ICD-10-CM

## 2018-01-01 DIAGNOSIS — E78.2 MIXED HYPERLIPIDEMIA: ICD-10-CM

## 2018-01-01 PROCEDURE — G0495 RN CARE TRAIN/EDU IN HH: HCPCS

## 2018-01-01 RX ORDER — FLUTICASONE FUROATE 100 UG/1
1 POWDER RESPIRATORY (INHALATION)
Refills: 4 | COMMUNITY
Start: 2018-11-22

## 2018-01-01 RX ORDER — METOLAZONE 2.5 MG/1
2.5 TABLET ORAL
Qty: 12 TAB | Refills: 2 | Status: SHIPPED | OUTPATIENT
Start: 2018-01-01 | End: 2018-01-01 | Stop reason: SDUPTHER

## 2018-01-01 RX ORDER — METOLAZONE 2.5 MG/1
2.5 TABLET ORAL
Qty: 12 TAB | Refills: 2 | Status: SHIPPED | OUTPATIENT
Start: 2018-01-01 | End: 2019-01-01

## 2018-01-03 ENCOUNTER — OFFICE VISIT (OUTPATIENT)
Dept: CARDIOLOGY CLINIC | Age: 83
End: 2018-01-03

## 2018-01-03 DIAGNOSIS — I50.22 CHRONIC SYSTOLIC HEART FAILURE (HCC): ICD-10-CM

## 2018-01-03 DIAGNOSIS — Z95.810 S/P IMPLANTATION OF AUTOMATIC CARDIOVERTER/DEFIBRILLATOR (AICD): Primary | ICD-10-CM

## 2018-01-03 DIAGNOSIS — I50.32 CHRONIC DIASTOLIC HEART FAILURE (HCC): ICD-10-CM

## 2018-01-03 DIAGNOSIS — I49.5 SICK SINUS SYNDROME (HCC): ICD-10-CM

## 2018-01-23 RX ORDER — FUROSEMIDE 80 MG/1
TABLET ORAL
Qty: 180 TAB | Refills: 0 | Status: SHIPPED | OUTPATIENT
Start: 2018-01-23 | End: 2018-04-27 | Stop reason: SDUPTHER

## 2018-01-29 RX ORDER — FUROSEMIDE 80 MG/1
TABLET ORAL
Qty: 180 TAB | Refills: 0 | Status: SHIPPED | OUTPATIENT
Start: 2018-01-29 | End: 2018-09-20 | Stop reason: SDUPTHER

## 2018-02-07 LAB
ALBUMIN SERPL-MCNC: 4.2 G/DL (ref 3.5–4.7)
ALBUMIN/GLOB SERPL: 1.3 {RATIO} (ref 1.2–2.2)
ALP SERPL-CCNC: 79 IU/L (ref 39–117)
ALT SERPL-CCNC: 10 IU/L (ref 0–32)
AST SERPL-CCNC: 14 IU/L (ref 0–40)
BILIRUB SERPL-MCNC: 0.6 MG/DL (ref 0–1.2)
BUN SERPL-MCNC: 14 MG/DL (ref 8–27)
BUN/CREAT SERPL: 14 (ref 12–28)
CALCIUM SERPL-MCNC: 8.9 MG/DL (ref 8.7–10.3)
CHLORIDE SERPL-SCNC: 97 MMOL/L (ref 96–106)
CHOLEST SERPL-MCNC: 130 MG/DL (ref 100–199)
CK SERPL-CCNC: 95 U/L (ref 24–173)
CO2 SERPL-SCNC: 32 MMOL/L (ref 18–29)
CREAT SERPL-MCNC: 1.02 MG/DL (ref 0.57–1)
GFR SERPLBLD CREATININE-BSD FMLA CKD-EPI: 51 ML/MIN/1.73
GFR SERPLBLD CREATININE-BSD FMLA CKD-EPI: 59 ML/MIN/1.73
GLOBULIN SER CALC-MCNC: 3.3 G/DL (ref 1.5–4.5)
GLUCOSE SERPL-MCNC: 89 MG/DL (ref 65–99)
HDLC SERPL-MCNC: 37 MG/DL
INTERPRETATION, 910389: NORMAL
INTERPRETATION: NORMAL
LDLC SERPL CALC-MCNC: 76 MG/DL (ref 0–99)
PDF IMAGE, 910387: NORMAL
POTASSIUM SERPL-SCNC: 4.2 MMOL/L (ref 3.5–5.2)
PROT SERPL-MCNC: 7.5 G/DL (ref 6–8.5)
SODIUM SERPL-SCNC: 144 MMOL/L (ref 134–144)
TRIGL SERPL-MCNC: 84 MG/DL (ref 0–149)
VLDLC SERPL CALC-MCNC: 17 MG/DL (ref 5–40)

## 2018-02-09 ENCOUNTER — CLINICAL SUPPORT (OUTPATIENT)
Dept: CARDIOLOGY CLINIC | Age: 83
End: 2018-02-09

## 2018-02-09 DIAGNOSIS — I25.10 ATHEROSCLEROSIS OF NATIVE CORONARY ARTERY OF NATIVE HEART WITHOUT ANGINA PECTORIS: ICD-10-CM

## 2018-02-09 DIAGNOSIS — I50.43 SYSTOLIC AND DIASTOLIC CHF, ACUTE ON CHRONIC (HCC): ICD-10-CM

## 2018-02-09 DIAGNOSIS — I50.22 CHRONIC SYSTOLIC HEART FAILURE (HCC): Primary | ICD-10-CM

## 2018-02-09 DIAGNOSIS — R06.02 SHORTNESS OF BREATH: ICD-10-CM

## 2018-02-09 DIAGNOSIS — I10 ESSENTIAL HYPERTENSION: Chronic | ICD-10-CM

## 2018-02-09 DIAGNOSIS — I49.5 SICK SINUS SYNDROME (HCC): ICD-10-CM

## 2018-02-09 DIAGNOSIS — E78.2 MIXED HYPERLIPIDEMIA: ICD-10-CM

## 2018-02-09 DIAGNOSIS — Z95.810 S/P IMPLANTATION OF AUTOMATIC CARDIOVERTER/DEFIBRILLATOR (AICD): ICD-10-CM

## 2018-02-15 ENCOUNTER — TELEPHONE (OUTPATIENT)
Dept: CARDIOLOGY CLINIC | Age: 83
End: 2018-02-15

## 2018-02-15 NOTE — TELEPHONE ENCOUNTER
----- Message from Juno Nixon MD sent at 2/15/2018  8:16 AM EST -----  Cholesterol is much improvedcontinue same. Echo looks about the same as before except the blood pressure in the lungs is significantly lower which is good. We have offered her a stress test on 2 occasions now but it looks like she chose not to get it done again. If she chooses to, please schedule. Otherwise, follow-up in 6 months.

## 2018-02-15 NOTE — PROGRESS NOTES
Cholesterol is much improvedcontinue same. Echo looks about the same as before except the blood pressure in the lungs is significantly lower which is good. We have offered her a stress test on 2 occasions now but it looks like she chose not to get it done again. If she chooses to, please schedule. Otherwise, follow-up in 6 months.

## 2018-03-19 ENCOUNTER — TELEPHONE (OUTPATIENT)
Dept: CARDIOLOGY CLINIC | Age: 83
End: 2018-03-19

## 2018-03-19 RX ORDER — NITROGLYCERIN 0.4 MG/1
0.4 TABLET SUBLINGUAL
Qty: 1 BOTTLE | Refills: 0 | Status: SHIPPED | OUTPATIENT
Start: 2018-03-19 | End: 2018-03-19 | Stop reason: SDUPTHER

## 2018-03-19 RX ORDER — NITROGLYCERIN 0.4 MG/1
TABLET SUBLINGUAL
COMMUNITY
End: 2018-03-19 | Stop reason: SDUPTHER

## 2018-03-19 NOTE — TELEPHONE ENCOUNTER
Pt would like a refill on her nitroglycerin tablets. Please call should you need to speak with patient.     Thanks    [facility]

## 2018-04-04 ENCOUNTER — OFFICE VISIT (OUTPATIENT)
Dept: CARDIOLOGY CLINIC | Age: 83
End: 2018-04-04

## 2018-04-04 DIAGNOSIS — I50.43 SYSTOLIC AND DIASTOLIC CHF, ACUTE ON CHRONIC (HCC): ICD-10-CM

## 2018-04-04 DIAGNOSIS — Z95.810 S/P IMPLANTATION OF AUTOMATIC CARDIOVERTER/DEFIBRILLATOR (AICD): Primary | ICD-10-CM

## 2018-04-04 DIAGNOSIS — I49.5 SICK SINUS SYNDROME (HCC): ICD-10-CM

## 2018-04-27 RX ORDER — FUROSEMIDE 80 MG/1
TABLET ORAL
Qty: 180 TAB | Refills: 0 | Status: SHIPPED | OUTPATIENT
Start: 2018-04-27 | End: 2018-10-23 | Stop reason: SDUPTHER

## 2018-07-11 ENCOUNTER — CLINICAL SUPPORT (OUTPATIENT)
Dept: CARDIOLOGY CLINIC | Age: 83
End: 2018-07-11

## 2018-07-11 DIAGNOSIS — Z95.810 S/P IMPLANTATION OF AUTOMATIC CARDIOVERTER/DEFIBRILLATOR (AICD): Primary | ICD-10-CM

## 2018-07-11 DIAGNOSIS — I50.43 SYSTOLIC AND DIASTOLIC CHF, ACUTE ON CHRONIC (HCC): ICD-10-CM

## 2018-07-11 DIAGNOSIS — I49.5 SICK SINUS SYNDROME (HCC): ICD-10-CM

## 2018-09-20 ENCOUNTER — CLINICAL SUPPORT (OUTPATIENT)
Dept: CARDIOLOGY CLINIC | Age: 83
End: 2018-09-20

## 2018-09-20 ENCOUNTER — OFFICE VISIT (OUTPATIENT)
Dept: CARDIOLOGY CLINIC | Age: 83
End: 2018-09-20

## 2018-09-20 VITALS
HEIGHT: 62 IN | BODY MASS INDEX: 37.61 KG/M2 | OXYGEN SATURATION: 98 % | DIASTOLIC BLOOD PRESSURE: 70 MMHG | HEART RATE: 78 BPM | SYSTOLIC BLOOD PRESSURE: 110 MMHG | RESPIRATION RATE: 16 BRPM | WEIGHT: 204.4 LBS

## 2018-09-20 VITALS
HEART RATE: 78 BPM | BODY MASS INDEX: 37.61 KG/M2 | WEIGHT: 204.4 LBS | HEIGHT: 62 IN | RESPIRATION RATE: 16 BRPM | DIASTOLIC BLOOD PRESSURE: 70 MMHG | SYSTOLIC BLOOD PRESSURE: 110 MMHG | OXYGEN SATURATION: 98 %

## 2018-09-20 DIAGNOSIS — I27.20 PULMONARY HTN (HCC): ICD-10-CM

## 2018-09-20 DIAGNOSIS — Z95.810 S/P IMPLANTATION OF AUTOMATIC CARDIOVERTER/DEFIBRILLATOR (AICD): ICD-10-CM

## 2018-09-20 DIAGNOSIS — E78.2 MIXED HYPERLIPIDEMIA: ICD-10-CM

## 2018-09-20 DIAGNOSIS — I49.5 SICK SINUS SYNDROME (HCC): ICD-10-CM

## 2018-09-20 DIAGNOSIS — I25.10 ATHEROSCLEROSIS OF NATIVE CORONARY ARTERY OF NATIVE HEART WITHOUT ANGINA PECTORIS: ICD-10-CM

## 2018-09-20 DIAGNOSIS — Z95.810 PRESENCE OF AUTOMATIC CARDIOVERTER/DEFIBRILLATOR (AICD): Primary | ICD-10-CM

## 2018-09-20 DIAGNOSIS — I10 ESSENTIAL HYPERTENSION: ICD-10-CM

## 2018-09-20 DIAGNOSIS — Z95.0 CARDIAC PACEMAKER IN SITU: ICD-10-CM

## 2018-09-20 DIAGNOSIS — I27.20 MODERATE TO SEVERE PULMONARY HYPERTENSION (HCC): ICD-10-CM

## 2018-09-20 DIAGNOSIS — I50.22 CHRONIC SYSTOLIC HEART FAILURE (HCC): ICD-10-CM

## 2018-09-20 DIAGNOSIS — I48.92 ATRIAL FLUTTER, UNSPECIFIED TYPE (HCC): ICD-10-CM

## 2018-09-20 DIAGNOSIS — I42.9 CARDIOMYOPATHY, UNSPECIFIED TYPE (HCC): Primary | ICD-10-CM

## 2018-09-20 DIAGNOSIS — I48.0 PAROXYSMAL ATRIAL FIBRILLATION (HCC): ICD-10-CM

## 2018-09-20 DIAGNOSIS — I50.22 CHRONIC SYSTOLIC HEART FAILURE (HCC): Primary | ICD-10-CM

## 2018-09-20 NOTE — PROGRESS NOTES
[id number] [address]  [phone] Subjective:  
  
Akua Morelos is a 80 y.o. female is here for routine f/u. The patient denies chest pain/ shortness of breath, orthopnea, PND, LE edema, palpitations, syncope, or presyncope. Patient Active Problem List  
 Diagnosis Date Noted  Syncope 05/26/2016  S/P implantation of automatic cardioverter/defibrillator (AICD) 01/14/2015  Pulmonary HTN ([address] 12/04/2014  Systolic and diastolic CHF, acute on chronic ([address] 09/03/2014  Acute-on-chronic respiratory failure ([address] 08/27/2014  Hypothyroidism 08/27/2014  Asthma 06/12/2013  Moderate to severe pulmonary hypertension ([address] 06/12/2013  Ischemic colitis ([address] 05/14/2012  Colitis, ischemic ([address] 03/26/2012  Hypokalemia 03/23/2012  Hypomagnesemia 03/23/2012  Rectal bleeding 03/22/2012  Chronic systolic heart failure ([address] 03/08/2012  Cardiac pacemaker in situ 03/08/2012  Cardiomyopathy in other diseases classified elsewhere 03/08/2012  Chronic diastolic heart failure ([address] 03/08/2012  Atrial flutter ([address] 03/08/2012  Coronary atherosclerosis of native coronary artery 03/08/2012  Chest pain 02/25/2012  Sick sinus syndrome ([address] 02/24/2012  
 HTN (hypertension) 02/24/2012  Hyperlipidemia 02/24/2012 Krystyna Marc MD 
Past Medical History:  
Diagnosis Date  Asthma  Autoimmune disease ([address] lupus  CAD (coronary artery disease)   
 cardiac cath  CAD (coronary artery disease) sick sinus syndrome  Essential hypertension  GERD (gastroesophageal reflux disease)  Glaucoma  Heart failure ([address] Lupus  Hypertension  Other ill-defined conditions(403.88)  Pacemaker  PUD (peptic ulcer disease)  S/P implantation of automatic cardioverter/defibrillator (AICD) 1/14/2015 [id number] upgrade to AICD implant  SSS (sick sinus syndrome) ([facility] Past Surgical History:  
Procedure Laterality Date  CARDIAC SURG PROCEDURE UNLIST    
 pacemaker/defibrilator.  HX GYN    
 BTL  
 HX PACEMAKER    
 NM COLONOSCOPY W/BIOPSY SINGLE/MULTIPLE  3/26/2012  NM COLSC FLX W/RMVL OF TUMOR POLYP LESION SNARE TQ  3/26/2012 Allergies Allergen Reactions  Contrast Agent [Iodine] Anaphylaxis Made aware of allergy 1/4/13  Sulfa (Sulfonamide Antibiotics) Hives  Codeine Anxiety  Pcn [Penicillins] Anaphylaxis Reaction was to penicillin injections into buttocks. She can take oral amoxicillin Family History Problem Relation Age of Onset  Arrhythmia Mother  Hypertension Mother  Hypertension Father Social History Social History  Marital status: SINGLE Spouse name: N/A  
 Number of children: N/A  
 Years of education: N/A Occupational History  Not on file. Social History Main Topics  Smoking status: Never Smoker  Smokeless tobacco: Never Used  Alcohol use No  
 Drug use: No  
 Sexual activity: Not on file Other Topics Concern  Not on file Social History Narrative Current Outpatient Prescriptions Medication Sig  furosemide (LASIX) 80 mg tablet TAKE 1 TABLET BY MOUTH TWICE DAILY  nitroglycerin (NITROSTAT) 0.4 mg SL tablet 1 Tab by SubLINGual route every five (5) minutes as needed for Chest Pain (Call 911 if not relieved by 3 tablets).  carvedilol (COREG) 25 mg tablet TAKE 1 TABLET BY MOUTH TWICE DAILY WITH MEALS  
 atorvastatin (LIPITOR) 40 mg tablet TAKE 1 TABLET BY MOUTH DAILY  potassium chloride SR (KLOR-CON 10) 10 mEq tablet TAKE 3 TABLETS BY MOUTH EVERY DAY  
 OXYGEN-AIR DELIVERY SYSTEMS by Does Not Apply route.  lisinopril (PRINIVIL, ZESTRIL) 10 mg tablet TAKE 1 TABLET BY MOUTH DAILY  NEXIUM 40 mg capsule TAKE ONE CAPSULE BY MOUTH DAILY  albuterol-ipratropium (DUONEB) 2.5 mg-0.5 mg/3 ml nebulizer solution 3 mL by Nebulization route every four (4) hours as needed for Wheezing.  aspirin delayed-release 81 mg tablet Take 81 mg by mouth daily.  budesonide (PULMICORT) 180 mcg/actuation aepb inhaler Take 2 puffs by inhalation two (2) times a day.  fluticasone (FLONASE) 50 mcg/actuation nasal spray 2 sprays by Both Nostrils route daily as needed for Rhinitis.  ciprofloxacin (CIPRO) 250 mg tablet Take 125 mg by mouth daily.  acetaminophen (TYLENOL ARTHRITIS PAIN) 650 mg CR tablet Take 650 mg by mouth every six (6) hours as needed.  latanoprost (XALATAN) 0.005 % ophthalmic solution Administer 1 Drop to both eyes nightly.  cetirizine (ZYRTEC) 10 mg tablet Take 10 mg by mouth daily. No current facility-administered medications for this visit. Facility-Administered Medications Ordered in Other Visits Medication Dose Route Frequency  ADDaptor  vancomycin (VANCOCIN) 1,000 mg injection  0.9% sodium chloride (MBP/ADV) 0.9 % infusion  bacitracin 50,000 unit injection Review of Symptoms: 
11 systems reviewed, negative other than as stated in the HPI Physical ExamPhysical Exam:   
Vitals:  
 09/20/18 5669 BP: 110/70 Pulse: 78 Resp: 16 SpO2: 98% Weight: 204 lb 6.4 oz (92.7 kg) Height: 5' 2\" (1.575 m) Body mass index is 37.39 kg/(m^2). General PE Gen:  NAD, appears comfortable in a wheelchair. Mental Status - Alert. General Appearance - Not in acute distress. Chest and Lung Exam  
Inspection: Accessory muscles - No use of accessory muscles in breathing. Auscultation:  
Breath sounds: - Normal.  
Cardiovascular Inspection: Jugular vein - Bilateral - Inspection Normal.  
Palpation/Percussion:  
Apical Impulse: - Normal.  
Auscultation: Rhythm - Regular. Heart Sounds - S1 WNL and S2 WNL. No S3 or S4. Murmurs & Other Heart Sounds:  Auscultation of the heart reveals - No Murmurs. Peripheral Vascular Upper Extremity: Inspection - Bilateral - No Cyanotic nailbeds or Digital clubbing. Lower Extremity:  
Palpation: Edema - Bilateral - No edema. Abdomen:   Soft, non-tender, bowel sounds are active. Neuro: A&O times 3, CN and motor grossly WNL Labs:  
Lab Results Component Value Date/Time Cholesterol, total 130 02/06/2018 09:51 AM  
 Cholesterol, total 172 04/27/2017 08:58 AM  
 Cholesterol, total 124 12/12/2014 08:51 AM  
 Cholesterol, Total 184 01/02/2013 11:40 AM  
 HDL Cholesterol 37 (L) 02/06/2018 09:51 AM  
 HDL Cholesterol 39 (L) 04/27/2017 08:58 AM  
 HDL Cholesterol 45 12/12/2014 08:51 AM  
 HDL Cholesterol 44 01/02/2013 11:40 AM  
 LDL Cholesterol 115 01/02/2013 11:40 AM  
 LDL, calculated 76 02/06/2018 09:51 AM  
 LDL, calculated 112 (H) 04/27/2017 08:58 AM  
 LDL, calculated 64 12/12/2014 08:51 AM  
 Triglyceride 84 02/06/2018 09:51 AM  
 Triglyceride 107 04/27/2017 08:58 AM  
 Triglyceride 75 12/12/2014 08:51 AM  
 Triglycerides 163 (H) 01/02/2013 11:40 AM  
 
Lab Results Component Value Date/Time CK 77 08/22/2017 01:46 AM  
 
Lab Results Component Value Date/Time Sodium 144 02/06/2018 09:51 AM  
 Potassium 4.2 02/06/2018 09:51 AM  
 Chloride 97 02/06/2018 09:51 AM  
 CO2 32 (H) 02/06/2018 09:51 AM  
 Anion gap 1 (L) 08/22/2017 01:46 AM  
 Glucose 89 02/06/2018 09:51 AM  
 BUN 14 02/06/2018 09:51 AM  
 Creatinine 1.02 (H) 02/06/2018 09:51 AM  
 BUN/Creatinine ratio 14 02/06/2018 09:51 AM  
 GFR est AA 59 (L) 02/06/2018 09:51 AM  
 GFR est non-AA 51 (L) 02/06/2018 09:51 AM  
 Calcium 8.9 02/06/2018 09:51 AM  
 Bilirubin, total 0.6 02/06/2018 09:51 AM  
 AST (SGOT) 14 02/06/2018 09:51 AM  
 Alk.  phosphatase 79 02/06/2018 09:51 AM  
 Protein, total 7.5 02/06/2018 09:51 AM  
 Albumin 4.2 02/06/2018 09:51 AM  
 Globulin 4.5 (H) 08/22/2017 01:46 AM  
 A-G Ratio 1.3 02/06/2018 09:51 AM  
 ALT (SGPT) 10 02/06/2018 09:51 AM  
 
 
EKG: 
 NSR, nonspecific changes Assessment: 1. Chronic systolic heart failure ([address] 2. Mixed hyperlipidemia 3. Sick sinus syndrome ([address] 4. Atrial flutter, unspecified type ([address] 5. Atherosclerosis of native coronary artery of native heart without angina pectoris 6. Cardiac pacemaker in situ 7. Moderate to severe pulmonary hypertension (HCC) 8. S/P implantation of automatic cardioverter/defibrillator (AICD) Orders Placed This Encounter  AMB POC EKG ROUTINE W/ 12 LEADS, INTER & REP Order Specific Question:   Reason for Exam: Answer:   routine Plan:  
 
Patient is a 49-year-old female who in the past has had multiple prior complaints of chest pain.      She currently has no complaints and has well compensated heart failure. 1.  CAD: 
Ana Paula Quinteroman was recommended on at least 2 occasions in the past, but she chose not to have it done. She now states that she has had no more chest discomfort and does not wish for any ischemic evaluation. Had 60% LAD lesion 2014. Continue aspirin, statin, and beta-blocker. 2.  Hypertension: Controlled at home. Continue current medication 3.  Hyperlipidemia: Continue statin therapy. LDL 76 in February 2018 on Lipitor. Continue same. 4.  History of sick sinus syndrome: Has pacemaker/ICD, followed regularly by Dr. Adeola Pathak 
5.  Systolic and diastolic heart failure: Ejection fraction 4045% in [id number] diastolic heart failure continue current dose of furosemide. Repeat echo in February 2018 showed LVEF 35-40% with only mild valvular pathology, left atrial enlargement. Follow-up in 6-12 months, sooner as needed.  
 
Savannah Gregory MD

## 2018-09-20 NOTE — PROGRESS NOTES
1. Have you been to the ER, urgent care clinic since your last visit? Hospitalized since your last visit? No 
 
2. Have you seen or consulted any other health care providers outside of the [address] [place] [address] since your last visit? Include any pap smears or colon screening. Yes July 2018 Patient has no cardiac complaints.

## 2018-09-20 NOTE — PROGRESS NOTES
Subjective:      Maria Isabel Claros is a 80 y.o. female is here for annual device follow up. The patient denies chest pain/ shortness of breath, orthopnea, PND, LE edema, palpitations, syncope, presyncope or fatigue.        Patient Active Problem List    Diagnosis Date Noted    Syncope 05/26/2016    S/P implantation of automatic cardioverter/defibrillator (AICD) 01/14/2015    Pulmonary HTN ([address] 80/19/4006    Systolic and diastolic CHF, acute on chronic ([address] 09/03/2014    Acute-on-chronic respiratory failure ([address] 08/27/2014    Hypothyroidism 08/27/2014    Asthma 06/12/2013    Moderate to severe pulmonary hypertension ([address] 06/12/2013    Ischemic colitis ([address] 05/14/2012    Colitis, ischemic ([address] 03/26/2012    Hypokalemia 03/23/2012    Hypomagnesemia 03/23/2012    Rectal bleeding 03/22/2012    Chronic systolic heart failure ([address] 03/08/2012    Cardiac pacemaker in situ 03/08/2012    Cardiomyopathy in other diseases classified elsewhere 03/08/2012    Chronic diastolic heart failure ([address] 03/08/2012    Atrial flutter ([address] 03/08/2012    Coronary atherosclerosis of native coronary artery 03/08/2012    Chest pain 02/25/2012    Sick sinus syndrome ([address] 02/24/2012    HTN (hypertension) 02/24/2012    Hyperlipidemia 02/24/2012      Tashia Sheppard MD  Past Medical History:   Diagnosis Date    Asthma     Autoimmune disease ([address]     lupus    CAD (coronary artery disease)     cardiac cath    CAD (coronary artery disease)     sick sinus syndrome    Essential hypertension     GERD (gastroesophageal reflux disease)     Glaucoma     Heart failure ([address]     HX OTHER MEDICAL     Lupus    Hypertension     Other ill-defined conditions(799.89)     Pacemaker     PUD (peptic ulcer disease)     S/P implantation of automatic cardioverter/defibrillator (AICD) 1/14/2015    [employer] upgrade to AICD implant    SSS (sick sinus syndrome) ([address]       Past Surgical History:   Procedure Laterality Date    CARDIAC SURG PROCEDURE UNLIST      pacemaker/defibrilator.  HX GYN      BTL    HX PACEMAKER      CA COLONOSCOPY W/BIOPSY SINGLE/MULTIPLE  3/26/2012         CA COLSC FLX W/RMVL OF TUMOR POLYP LESION SNARE TQ  3/26/2012          Allergies   Allergen Reactions    Contrast Agent [Iodine] Anaphylaxis     Made aware of allergy 1/4/13    Sulfa (Sulfonamide Antibiotics) Hives    Codeine Anxiety    Pcn [Penicillins] Anaphylaxis     Reaction was to penicillin injections into buttocks. She can take oral amoxicillin      Family History   Problem Relation Age of Onset    Arrhythmia Mother     Hypertension Mother     Hypertension Father     negative for cardiac disease  Social History     Social History    Marital status: SINGLE     Spouse name: N/A    Number of children: N/A    Years of education: N/A     Social History Main Topics    Smoking status: Never Smoker    Smokeless tobacco: Never Used    Alcohol use No    Drug use: No    Sexual activity: Not Asked     Other Topics Concern    None     Social History Narrative     Current Outpatient Prescriptions   Medication Sig    furosemide (LASIX) 80 mg tablet TAKE 1 TABLET BY MOUTH TWICE DAILY    nitroglycerin (NITROSTAT) 0.4 mg SL tablet 1 Tab by SubLINGual route every five (5) minutes as needed for Chest Pain (Call 911 if not relieved by 3 tablets).  carvedilol (COREG) 25 mg tablet TAKE 1 TABLET BY MOUTH TWICE DAILY WITH MEALS    atorvastatin (LIPITOR) 40 mg tablet TAKE 1 TABLET BY MOUTH DAILY    potassium chloride SR (KLOR-CON 10) 10 mEq tablet TAKE 3 TABLETS BY MOUTH EVERY DAY    OXYGEN-AIR DELIVERY SYSTEMS by Does Not Apply route.  lisinopril (PRINIVIL, ZESTRIL) 10 mg tablet TAKE 1 TABLET BY MOUTH DAILY    NEXIUM 40 mg capsule TAKE ONE CAPSULE BY MOUTH DAILY    albuterol-ipratropium (DUONEB) 2.5 mg-0.5 mg/3 ml nebulizer solution 3 mL by Nebulization route every four (4) hours as needed for Wheezing.     aspirin delayed-release 81 mg tablet Take 81 mg by mouth daily.  budesonide (PULMICORT) 180 mcg/actuation aepb inhaler Take 2 puffs by inhalation two (2) times a day.  fluticasone (FLONASE) 50 mcg/actuation nasal spray 2 sprays by Both Nostrils route daily as needed for Rhinitis.  ciprofloxacin (CIPRO) 250 mg tablet Take 125 mg by mouth daily.  acetaminophen (TYLENOL ARTHRITIS PAIN) 650 mg CR tablet Take 650 mg by mouth every six (6) hours as needed.  latanoprost (XALATAN) 0.005 % ophthalmic solution Administer 1 Drop to both eyes nightly.  cetirizine (ZYRTEC) 10 mg tablet Take 10 mg by mouth daily. No current facility-administered medications for this visit. Facility-Administered Medications Ordered in Other Visits   Medication Dose Route Frequency    ADDaptor        vancomycin (VANCOCIN) 1,000 mg injection        0.9% sodium chloride (MBP/ADV) 0.9 % infusion        bacitracin 50,000 unit injection          Vitals:    09/20/18 0904   BP: 110/70   Pulse: 78   Resp: 16   SpO2: 98%   Weight: 204 lb 6.4 oz (92.7 kg)   Height: 5' 2\" (1.575 m)       I have reviewed the nurses notes, vitals, problem list, allergy list, medical history, family, social history and medications. Review of Symptoms:    General: Pt denies excessive weight gain or loss. Pt is able to conduct ADL's  HEENT: Denies blurred vision, headaches, epistaxis and difficulty swallowing. Respiratory: Denies shortness of breath, VILLAR, wheezing or stridor. Cardiovascular: Denies precordial pain, palpitations, edema or PND  Gastrointestinal: Denies poor appetite, indigestion, abdominal pain or blood in stool  Urinary: Denies dysuria, pyuria  Musculoskeletal: Denies pain or swelling from muscles or joints  Neurologic: Denies tremor, paresthesias, or sensory motor disturbance  Skin: Denies rash, itching or texture change. Psych: Denies depression      Physical Exam:      General: Well developed, in no acute distress.   HEENT: Eyes - PERRL, no jvd  Heart:  Normal S1/S2 negative S3 or S4. Regular, no murmur, gallop or rub.   Respiratory: Clear bilaterally x 4, no wheezing or rales  Abdomen:   Soft, non-tender, bowel sounds are active.   Extremities:  No edema, normal cap refill, no cyanosis. Musculoskeletal: No clubbing  Neuro: A&Ox3, speech clear, gait stable. Skin: Skin color is normal. No rashes or lesions. Non diaphoretic  Vascular: 2+ pulses symmetric in all extremities    Cardiographics    Ekg: sinus rhythm     Results for orders placed or performed during the hospital encounter of 08/22/17   EKG, 12 LEAD, INITIAL   Result Value Ref Range    Ventricular Rate 84 BPM    Atrial Rate 84 BPM    P-R Interval 178 ms    QRS Duration 118 ms    Q-T Interval 412 ms    QTC Calculation (Bezet) 486 ms    Calculated P Axis 38 degrees    Calculated R Axis -33 degrees    Calculated T Axis 69 degrees    Diagnosis       Sinus rhythm with marked sinus arrhythmia with occasional  Left axis deviation  Left ventricular hypertrophy with QRS widening    Confirmed by Robby Brochure ([id number]) on 8/22/2017 8:02:06 AM           Lab Results   Component Value Date/Time    WBC 7.9 08/22/2017 01:46 AM    HGB 9.4 (L) 08/22/2017 01:46 AM    HCT 31.0 (L) 08/22/2017 01:46 AM    PLATELET 187 79/15/8806 01:46 AM    MCV 90.1 08/22/2017 01:46 AM      Lab Results   Component Value Date/Time    Sodium 144 02/06/2018 09:51 AM    Potassium 4.2 02/06/2018 09:51 AM    Chloride 97 02/06/2018 09:51 AM    CO2 32 (H) 02/06/2018 09:51 AM    Anion gap 1 (L) 08/22/2017 01:46 AM    Glucose 89 02/06/2018 09:51 AM    BUN 14 02/06/2018 09:51 AM    Creatinine 1.02 (H) 02/06/2018 09:51 AM    BUN/Creatinine ratio 14 02/06/2018 09:51 AM    GFR est AA 59 (L) 02/06/2018 09:51 AM    GFR est non-AA 51 (L) 02/06/2018 09:51 AM    Calcium 8.9 02/06/2018 09:51 AM    Bilirubin, total 0.6 02/06/2018 09:51 AM    AST (SGOT) 14 02/06/2018 09:51 AM    Alk.  phosphatase 79 02/06/2018 09:51 AM    Protein, total 7.5 02/06/2018 09:51 AM    Albumin 4.2 02/06/2018 09:51 AM    Globulin 4.5 (H) 08/22/2017 01:46 AM    A-G Ratio 1.3 02/06/2018 09:51 AM    ALT (SGPT) 10 02/06/2018 09:51 AM         Assessment:     Assessment:        ICD-10-CM ICD-9-CM    1. Cardiomyopathy, unspecified type ([facility] I42.9 425.4    2. S/P implantation of automatic cardioverter/defibrillator (AICD) Z95.810 V45.02    3. Pulmonary HTN (HCC) I27.20 416.8    4. Sick sinus syndrome (HCC) I49.5 427.81    5. Paroxysmal atrial fibrillation (HCC) I48.0 427.31    6. Mixed hyperlipidemia E78.2 272.2    7. Essential hypertension I10 401.9      No orders of the defined types were placed in this encounter. Plan:   Ms. George Beasley is here for annual device follow up. She is doing well. She reports baseline shortness of breath and is oxygen dependent. EKG demonstrates normal sinus rhythm. Device interrogation today demonstrates normal functioning. Skyla Spencer She will continue per device clinic and follow up with Dr. Monique Guerrier in one year. Continue medical management for SSS. Thank you for allowing me to participate in Christian Delvalle 's care. Bob Vaca MD    Patient seen and examined by me with nurse practitioner. I personally performed all components of the history, physical, and medical decision making and agree with the assessment and plan with minor modifications as noted. Pt in sinus rhythm. Pacemaker check wnl.  Cont med rx for htn, hyperlipidemia and copd    Bob Vaca MD, [facility]

## 2018-09-20 NOTE — MR AVS SNAPSHOT
Blaise Landrum Sandor [address] [facility] 
[phone] Patient: Akua Morelos MRN: [id number] EIM:[id number] Visit Information Date & Time Provider Department Dept. Phone Encounter #  
 9/20/2018  9:00 AM Wojciech Gamboa Jayme, [address] [facility] [phone] [id number] Follow-up Instructions Return in about 1 year (around 9/20/2019). Follow-up and Disposition History Your Appointments 12/26/2018  8:00 AM  
PROCEDURE with [facility]) Appt Note: NOT AN OFFICE VISIT - REMOTE BSC ICD; due for annual visit; NOT AN OFFICE VISIT - REMOTE BSC ICD due for annual visit [id number] [facility]  
[phone] [id number] [facility]  
  
    
 10/3/2019  3:15 PM  
PROCEDURE with PACEMAKER, [facility]) Appt Note: bsc dcicd, on Lat, see NP  
 [id number] [facility]  
[phone] [id number] [facility] Upcoming Health Maintenance Date Due  
 FOOT EXAM Q1 5/16/1944 MICROALBUMIN Q1 5/16/1944 EYE EXAM RETINAL OR DILATED Q1 5/16/1944 DTaP/Tdap/Td series (1 - Tdap) 5/16/1955 ZOSTER VACCINE AGE 60> 3/16/1994 GLAUCOMA SCREENING Q2Y 5/16/1999 Bone Densitometry (Dexa) Screening 5/16/1999 HEMOGLOBIN A1C Q6M 7/19/2015 Pneumococcal 65+ Low/Medium Risk (2 of 2 - PPSV23) 2/25/2017 MEDICARE YEARLY EXAM 3/14/2018 Influenza Age 5 to Adult 8/1/2018 LIPID PANEL Q1 2/6/2019 Allergies as of 9/20/2018  Review Complete On: 9/20/2018 By: Connie Reilly MD  
  
 Severity Noted Reaction Type Reactions Contrast Agent [Iodine] High 01/04/2013   Systemic Anaphylaxis Made aware of allergy 1/4/13 Sulfa (Sulfonamide Antibiotics) High 02/24/2012   Side Effect Hives Codeine  03/01/2012    Anxiety Pcn [Penicillins]  04/22/2011   Intolerance Anaphylaxis Reaction was to penicillin injections into buttocks. She can take oral amoxicillin Current Immunizations  Reviewed on 8/27/2014 Name Date Influenza Vaccine 10/1/2014 Influenza Vaccine Split 9/5/2011 ZZZ-RETIRED (DO NOT USE) Pneumococcal Vaccine (Unspecified Type) 2/25/2012  3:47 PM  
  
 Not reviewed this visit You Were Diagnosed With   
  
 Codes Comments Cardiomyopathy, unspecified type ([facility]    -  Primary ICD-10-CM: I42.9 ICD-9-CM: 425.4 S/P implantation of automatic cardioverter/defibrillator (AICD)     ICD-10-CM: Z95.810 ICD-9-CM: V45.02   
 Pulmonary HTN ([facility].)     ICD-10-CM: I27.20 ICD-9-CM: 416.8 Sick sinus syndrome (HCC)     ICD-10-CM: I49.5 ICD-9-CM: 427.81 Paroxysmal atrial fibrillation (HCC)     ICD-10-CM: I48.0 ICD-9-CM: 427.31 Mixed hyperlipidemia     ICD-10-CM: E78.2 ICD-9-CM: 272.2 Essential hypertension     ICD-10-CM: I10 
ICD-9-CM: 401.9 Vitals BP Pulse Resp Height(growth percentile) Weight(growth percentile) SpO2  
 110/70 (BP 1 Location: Left arm, BP Patient Position: Sitting) 78 16 5' 2\" (1.575 m) 204 lb 6.4 oz (92.7 kg) 98% BMI OB Status Smoking Status 37.39 kg/m2 Postmenopausal Never Smoker BMI and BSA Data Body Mass Index Body Surface Area  
 37.39 kg/m 2 2.01 m 2 Preferred Pharmacy Pharmacy Name Phone [facility]elies[facility]  [facility] [phone] Your Updated Medication List  
  
   
This list is accurate as of 9/20/18 10:15 AM.  Always use your most recent med list.  
  
  
  
  
 aspirin delayed-release 81 mg tablet Take 81 mg by mouth daily. atorvastatin 40 mg tablet Commonly known as:  LIPITOR  
TAKE 1 TABLET BY MOUTH DAILY  
  
 budesonide 180 mcg/actuation Aepb inhaler Commonly known as:  PULMICORT  
 Take 2 puffs by inhalation two (2) times a day. carvedilol 25 mg tablet Commonly known as:  COREG  
TAKE 1 TABLET BY MOUTH TWICE DAILY WITH MEALS  
  
 ciprofloxacin HCl 250 mg tablet Commonly known as:  CIPRO Take 125 mg by mouth daily. DUONEB 2.5 mg-0.5 mg/3 ml Nebu Generic drug:  albuterol-ipratropium 3 mL by Nebulization route every four (4) hours as needed for Wheezing. FLONASE 50 mcg/actuation nasal spray Generic drug:  fluticasone 2 sprays by Both Nostrils route daily as needed for Rhinitis. furosemide 80 mg tablet Commonly known as:  LASIX TAKE 1 TABLET BY MOUTH TWICE DAILY  
  
 latanoprost 0.005 % ophthalmic solution Commonly known as:  Milton Public Administer 1 Drop to both eyes nightly. lisinopril 10 mg tablet Commonly known as:  PRINIVIL, ZESTRIL  
TAKE 1 TABLET BY MOUTH DAILY NexIUM 40 mg capsule Generic drug:  esomeprazole TAKE ONE CAPSULE BY MOUTH DAILY  
  
 nitroglycerin 0.4 mg SL tablet Commonly known as:  NITROSTAT  
1 Tab by SubLINGual route every five (5) minutes as needed for Chest Pain (Call 911 if not relieved by 3 tablets). OXYGEN-AIR DELIVERY SYSTEMS  
by Does Not Apply route. potassium chloride SR 10 mEq tablet Commonly known as:  KLOR-CON 10  
TAKE 3 TABLETS BY MOUTH EVERY DAY  
  
 TYLENOL ARTHRITIS PAIN 650 mg Tber Generic drug:  acetaminophen Take 650 mg by mouth every six (6) hours as needed. ZyrTEC 10 mg tablet Generic drug:  cetirizine Take 10 mg by mouth daily. Follow-up Instructions Return in about 1 year (around 9/20/2019). Introducing \[facility]\"" & HEALTH SERVICES! [facility] introduces [employer] patient portal. Now you can access parts of your medical record, email your doctor's office, and request medication refills online. 1. In your internet browser, go to [email or web address] 2. Click on the First Time User? Click Here link in the Sign In box.  You will see the New Member Sign Up page. 3. Enter your Mobile2Me Access Code exactly as it appears below. You will not need to use this code after youve completed the sign-up process. If you do not sign up before the expiration date, you must request a new code. · Mobile2Me Access Code: [email or web address] Expires: 10/25/2018  4:02 PM 
 
4. Enter the last four digits of your Social Security Number (xxxx) and Date of Birth (mm/dd/yyyy) as indicated and click Submit. You will be taken to the next sign-up page. 5. Create a Checkt ID. This will be your Mobile2Me login ID and cannot be changed, so think of one that is secure and easy to remember. 6. Create a Mobile2Me password. You can change your password at any time. 7. Enter your Password Reset Question and Answer. This can be used at a later time if you forget your password. 8. Enter your e-mail address. You will receive e-mail notification when new information is available in [address] 9. Click Sign Up. You can now view and download portions of your medical record. 10. Click the Download Summary menu link to download a portable copy of your medical information. If you have questions, please visit the Frequently Asked Questions section of the Mobile2Me website. Remember, Mobile2Me is NOT to be used for urgent needs. For medical emergencies, dial 911. Now available from your iPhone and Android! Please provide this summary of care documentation to your next provider. Your primary care clinician is listed as Aries Patrick. If you have any questions after today's visit, please call [phone].

## 2018-09-20 NOTE — PROGRESS NOTES
1. Have you been to the ER, urgent care clinic since your last visit? Hospitalized since your last visit? No    2. Have you seen or consulted any other health care providers outside of the [address] [place] [address] since your last visit? Include any pap smears or colon screening. Yes When: PCP 7/2018     Patient has no cardiac complaints.

## 2018-10-02 ENCOUNTER — HOSPITAL ENCOUNTER (INPATIENT)
Age: 83
LOS: 3 days | Discharge: HOME | DRG: 378 | End: 2018-10-05
Attending: EMERGENCY MEDICINE | Admitting: HOSPITALIST
Payer: MEDICARE

## 2018-10-02 ENCOUNTER — APPOINTMENT (OUTPATIENT)
Dept: CT IMAGING | Age: 83
DRG: 378 | End: 2018-10-02
Attending: EMERGENCY MEDICINE
Payer: MEDICARE

## 2018-10-02 DIAGNOSIS — K92.2 ACUTE LOWER GI BLEEDING: Primary | ICD-10-CM

## 2018-10-02 PROBLEM — K57.90 DIVERTICULOSIS: Status: ACTIVE | Noted: 2018-10-02

## 2018-10-02 LAB
ALBUMIN SERPL-MCNC: 3.6 G/DL (ref 3.5–5)
ALBUMIN/GLOB SERPL: 0.8 {RATIO} (ref 1.1–2.2)
ALP SERPL-CCNC: 80 U/L (ref 45–117)
ALT SERPL-CCNC: 17 U/L (ref 12–78)
ANION GAP SERPL CALC-SCNC: 2 MMOL/L (ref 5–15)
AST SERPL-CCNC: 16 U/L (ref 15–37)
BASOPHILS # BLD: 0 K/UL (ref 0–0.1)
BASOPHILS NFR BLD: 1 % (ref 0–1)
BILIRUB SERPL-MCNC: 0.7 MG/DL (ref 0.2–1)
BUN SERPL-MCNC: 13 MG/DL (ref 6–20)
BUN/CREAT SERPL: 14 (ref 12–20)
CALCIUM SERPL-MCNC: 8.4 MG/DL (ref 8.5–10.1)
CHLORIDE SERPL-SCNC: 98 MMOL/L (ref 97–108)
CO2 SERPL-SCNC: 39 MMOL/L (ref 21–32)
CREAT SERPL-MCNC: 0.91 MG/DL (ref 0.55–1.02)
DIFFERENTIAL METHOD BLD: ABNORMAL
EOSINOPHIL # BLD: 0.2 K/UL (ref 0–0.4)
EOSINOPHIL NFR BLD: 3 % (ref 0–7)
ERYTHROCYTE [DISTWIDTH] IN BLOOD BY AUTOMATED COUNT: 12.9 % (ref 11.5–14.5)
GLOBULIN SER CALC-MCNC: 4.4 G/DL (ref 2–4)
GLUCOSE SERPL-MCNC: 94 MG/DL (ref 65–100)
HCT VFR BLD AUTO: 24.5 % (ref 35–47)
HCT VFR BLD AUTO: 32.2 % (ref 35–47)
HGB BLD-MCNC: 7.5 G/DL (ref 11.5–16)
HGB BLD-MCNC: 9.5 G/DL (ref 11.5–16)
IMM GRANULOCYTES # BLD: 0 K/UL (ref 0–0.04)
IMM GRANULOCYTES NFR BLD AUTO: 1 % (ref 0–0.5)
INR PPP: 1.1 (ref 0.9–1.1)
LYMPHOCYTES # BLD: 1 K/UL (ref 0.8–3.5)
LYMPHOCYTES NFR BLD: 16 % (ref 12–49)
MCH RBC QN AUTO: 27.5 PG (ref 26–34)
MCHC RBC AUTO-ENTMCNC: 29.5 G/DL (ref 30–36.5)
MCV RBC AUTO: 93.3 FL (ref 80–99)
MONOCYTES # BLD: 0.8 K/UL (ref 0–1)
MONOCYTES NFR BLD: 13 % (ref 5–13)
NEUTS SEG # BLD: 4.3 K/UL (ref 1.8–8)
NEUTS SEG NFR BLD: 67 % (ref 32–75)
NRBC # BLD: 0 K/UL (ref 0–0.01)
NRBC BLD-RTO: 0 PER 100 WBC
PLATELET # BLD AUTO: 281 K/UL (ref 150–400)
PMV BLD AUTO: 8.9 FL (ref 8.9–12.9)
POTASSIUM SERPL-SCNC: 4.3 MMOL/L (ref 3.5–5.1)
PROT SERPL-MCNC: 8 G/DL (ref 6.4–8.2)
PROTHROMBIN TIME: 11.1 SEC (ref 9–11.1)
RBC # BLD AUTO: 3.45 M/UL (ref 3.8–5.2)
SODIUM SERPL-SCNC: 139 MMOL/L (ref 136–145)
WBC # BLD AUTO: 6.3 K/UL (ref 3.6–11)

## 2018-10-02 PROCEDURE — 36415 COLL VENOUS BLD VENIPUNCTURE: CPT | Performed by: EMERGENCY MEDICINE

## 2018-10-02 PROCEDURE — 85025 COMPLETE CBC W/AUTO DIFF WBC: CPT | Performed by: EMERGENCY MEDICINE

## 2018-10-02 PROCEDURE — 86923 COMPATIBILITY TEST ELECTRIC: CPT | Performed by: EMERGENCY MEDICINE

## 2018-10-02 PROCEDURE — 65660000000 HC RM CCU STEPDOWN

## 2018-10-02 PROCEDURE — 94761 N-INVAS EAR/PLS OXIMETRY MLT: CPT

## 2018-10-02 PROCEDURE — 74011250637 HC RX REV CODE- 250/637: Performed by: HOSPITALIST

## 2018-10-02 PROCEDURE — 80053 COMPREHEN METABOLIC PANEL: CPT | Performed by: EMERGENCY MEDICINE

## 2018-10-02 PROCEDURE — 85610 PROTHROMBIN TIME: CPT | Performed by: EMERGENCY MEDICINE

## 2018-10-02 PROCEDURE — 74011250636 HC RX REV CODE- 250/636: Performed by: INTERNAL MEDICINE

## 2018-10-02 PROCEDURE — 86900 BLOOD TYPING SEROLOGIC ABO: CPT | Performed by: EMERGENCY MEDICINE

## 2018-10-02 PROCEDURE — 85018 HEMOGLOBIN: CPT | Performed by: HOSPITALIST

## 2018-10-02 PROCEDURE — 74011000250 HC RX REV CODE- 250: Performed by: HOSPITALIST

## 2018-10-02 PROCEDURE — 99285 EMERGENCY DEPT VISIT HI MDM: CPT

## 2018-10-02 RX ORDER — SODIUM CHLORIDE 9 MG/ML
25 INJECTION, SOLUTION INTRAVENOUS CONTINUOUS
Status: DISCONTINUED | OUTPATIENT
Start: 2018-10-02 | End: 2018-10-05 | Stop reason: HOSPADM

## 2018-10-02 RX ORDER — FUROSEMIDE 40 MG/1
80 TABLET ORAL
Status: DISCONTINUED | OUTPATIENT
Start: 2018-10-02 | End: 2018-10-02

## 2018-10-02 RX ORDER — PANTOPRAZOLE SODIUM 40 MG/1
40 TABLET, DELAYED RELEASE ORAL
Status: DISCONTINUED | OUTPATIENT
Start: 2018-10-03 | End: 2018-10-05 | Stop reason: HOSPADM

## 2018-10-02 RX ORDER — SODIUM CHLORIDE 0.9 % (FLUSH) 0.9 %
5-10 SYRINGE (ML) INJECTION EVERY 8 HOURS
Status: DISCONTINUED | OUTPATIENT
Start: 2018-10-02 | End: 2018-10-05 | Stop reason: HOSPADM

## 2018-10-02 RX ORDER — ACETAMINOPHEN 325 MG/1
650 TABLET ORAL
Status: DISCONTINUED | OUTPATIENT
Start: 2018-10-02 | End: 2018-10-05 | Stop reason: HOSPADM

## 2018-10-02 RX ORDER — POTASSIUM CHLORIDE 750 MG/1
20 TABLET, FILM COATED, EXTENDED RELEASE ORAL
Status: DISCONTINUED | OUTPATIENT
Start: 2018-10-02 | End: 2018-10-05 | Stop reason: HOSPADM

## 2018-10-02 RX ORDER — ALBUTEROL SULFATE 90 UG/1
1-2 AEROSOL, METERED RESPIRATORY (INHALATION)
COMMUNITY
End: 2019-01-01

## 2018-10-02 RX ORDER — CARVEDILOL 12.5 MG/1
25 TABLET ORAL 2 TIMES DAILY WITH MEALS
Status: DISCONTINUED | OUTPATIENT
Start: 2018-10-02 | End: 2018-10-05 | Stop reason: HOSPADM

## 2018-10-02 RX ORDER — POTASSIUM CHLORIDE 750 MG/1
20 TABLET, FILM COATED, EXTENDED RELEASE ORAL
COMMUNITY
End: 2018-12-10

## 2018-10-02 RX ORDER — ATORVASTATIN CALCIUM 40 MG/1
40 TABLET, FILM COATED ORAL DAILY
Status: DISCONTINUED | OUTPATIENT
Start: 2018-10-03 | End: 2018-10-05 | Stop reason: HOSPADM

## 2018-10-02 RX ORDER — NITROGLYCERIN 0.4 MG/1
0.4 TABLET SUBLINGUAL
Status: DISCONTINUED | OUTPATIENT
Start: 2018-10-02 | End: 2018-10-05 | Stop reason: HOSPADM

## 2018-10-02 RX ORDER — POTASSIUM CHLORIDE 750 MG/1
10 TABLET, FILM COATED, EXTENDED RELEASE ORAL 2 TIMES DAILY
COMMUNITY

## 2018-10-02 RX ORDER — SODIUM CHLORIDE 0.9 % (FLUSH) 0.9 %
5-10 SYRINGE (ML) INJECTION AS NEEDED
Status: DISCONTINUED | OUTPATIENT
Start: 2018-10-02 | End: 2018-10-05 | Stop reason: HOSPADM

## 2018-10-02 RX ORDER — LATANOPROST 50 UG/ML
1 SOLUTION/ DROPS OPHTHALMIC
Status: DISCONTINUED | OUTPATIENT
Start: 2018-10-02 | End: 2018-10-05 | Stop reason: HOSPADM

## 2018-10-02 RX ORDER — ONDANSETRON 2 MG/ML
4 INJECTION INTRAMUSCULAR; INTRAVENOUS
Status: DISCONTINUED | OUTPATIENT
Start: 2018-10-02 | End: 2018-10-05 | Stop reason: HOSPADM

## 2018-10-02 RX ORDER — LISINOPRIL 5 MG/1
10 TABLET ORAL DAILY
Status: DISCONTINUED | OUTPATIENT
Start: 2018-10-03 | End: 2018-10-02

## 2018-10-02 RX ORDER — CETIRIZINE HCL 10 MG
10 TABLET ORAL DAILY
Status: DISCONTINUED | OUTPATIENT
Start: 2018-10-02 | End: 2018-10-05 | Stop reason: HOSPADM

## 2018-10-02 RX ORDER — POTASSIUM CHLORIDE 750 MG/1
10 TABLET, FILM COATED, EXTENDED RELEASE ORAL DAILY
Status: DISCONTINUED | OUTPATIENT
Start: 2018-10-03 | End: 2018-10-02

## 2018-10-02 RX ORDER — CIPROFLOXACIN 250 MG/1
125 TABLET, FILM COATED ORAL DAILY
Status: DISCONTINUED | OUTPATIENT
Start: 2018-10-02 | End: 2018-10-05 | Stop reason: HOSPADM

## 2018-10-02 RX ADMIN — Medication 10 ML: at 22:00

## 2018-10-02 RX ADMIN — POTASSIUM CHLORIDE 20 MEQ: 750 TABLET, FILM COATED, EXTENDED RELEASE ORAL at 22:20

## 2018-10-02 RX ADMIN — CARVEDILOL 25 MG: 12.5 TABLET, FILM COATED ORAL at 16:14

## 2018-10-02 RX ADMIN — SODIUM CHLORIDE 50 ML/HR: 900 INJECTION, SOLUTION INTRAVENOUS at 22:20

## 2018-10-02 RX ADMIN — LATANOPROST 1 DROP: 50 SOLUTION OPHTHALMIC at 22:20

## 2018-10-02 RX ADMIN — CETIRIZINE HYDROCHLORIDE 10 MG: 10 TABLET, FILM COATED ORAL at 15:25

## 2018-10-02 RX ADMIN — FUROSEMIDE 80 MG: 40 TABLET ORAL at 16:14

## 2018-10-02 RX ADMIN — Medication 10 ML: at 16:19

## 2018-10-02 RX ADMIN — CIPROFLOXACIN HYDROCHLORIDE 125 MG: 250 TABLET, FILM COATED ORAL at 16:14

## 2018-10-02 NOTE — PROGRESS NOTES
Pharmacy Clarification of Prior to Admission Medication Regimen The patient was interviewed regarding clarification of the prior to admission medication regimen. Daughters were present in room and obtained permission from patient to discuss drug regimen with visitor(s) present. Patient was questioned regarding use of any other inhalers, topical products, over the counter medications, herbal medications, vitamin products or ophthalmic/nasal/otic medication use. Information Obtained From: Patient's daughter, prescription bottles, RX Query Pertinent Pharmacy Findings: 
? Patient's daughter, Jeremias Quiles, manages the patient's medications ? budesonide (PULMICORT) 180 mcg/actuation aepb inhaler: Patient has not used this agent in 'about 6 weeks' due to her nebulizer being broken. ? ciprofloxacin (CIPRO) 250 mg tablet: Patient takes 125 mg of this agent daily for UTI prophylaxis PTA medication list was corrected to the following:  
 
Prior to Admission Medications Prescriptions Last Dose Informant Patient Reported? Taking? NEXIUM 40 mg capsule 10/1/2018 at Unknown time Child No Yes Sig: TAKE ONE CAPSULE BY MOUTH DAILY Psyllium Husk-Sucrose (FIBER, PSYLLIUM HUSK/SUGAR,) 3.4 gram/11 gram powd 10/1/2018 at Unknown time Child Yes Yes Sig: Take 1 Cap by mouth daily. acetaminophen (TYLENOL ARTHRITIS PAIN) 650 mg CR tablet 10/1/2018 at Unknown time Child Yes Yes Sig: Take 650 mg by mouth every six (6) hours as needed for Pain. albuterol (PROVENTIL HFA, VENTOLIN HFA, PROAIR HFA) 90 mcg/actuation inhaler 9/25/2018 at Unknown time Child Yes Yes Sig: Take 1-2 Puffs by inhalation every four (4) hours as needed for Wheezing or Shortness of Breath. aspirin delayed-release 81 mg tablet 10/1/2018 at Unknown time Child Yes Yes Sig: Take 81 mg by mouth daily. atorvastatin (LIPITOR) 40 mg tablet 10/1/2018 at Unknown time Child No Yes Sig: TAKE 1 TABLET BY MOUTH DAILY budesonide (PULMICORT) 180 mcg/actuation aepb inhaler Not Taking at Unknown time Child Yes No  
Sig: Take 2 puffs by inhalation two (2) times a day. carvedilol (COREG) 25 mg tablet 10/1/2018 at Unknown time Child No Yes Sig: TAKE 1 TABLET BY MOUTH TWICE DAILY WITH MEALS  
cetirizine (ZYRTEC) 10 mg tablet 10/1/2018 at Unknown time Child Yes Yes Sig: Take 10 mg by mouth daily. ciprofloxacin (CIPRO) 250 mg tablet 10/1/2018 at Unknown time Child Yes Yes Sig: Take 125 mg by mouth daily. for UTI prophylaxis  
furosemide (LASIX) 80 mg tablet 10/1/2018 at Unknown time Child No Yes Sig: TAKE 1 TABLET BY MOUTH TWICE DAILY  
latanoprost (XALATAN) 0.005 % ophthalmic solution 10/1/2018 at Unknown time Child Yes Yes Sig: Administer 1 Drop to both eyes nightly. lisinopril (PRINIVIL, ZESTRIL) 10 mg tablet 10/1/2018 at Unknown time Child No Yes Sig: TAKE 1 TABLET BY MOUTH DAILY  
mv-mn/folic acid/calcium/vit K (ONE-A-DAY WOMEN'S 50 PLUS PO) 10/1/2018 at Unknown time Child Yes Yes Sig: Take 1 Tab by mouth daily. nitroglycerin (NITROSTAT) 0.4 mg SL tablet 2018 at Unknown time Child No Yes Si Tab by SubLINGual route every five (5) minutes as needed for Chest Pain (Call 911 if not relieved by 3 tablets). potassium chloride SR (KLOR-CON 10) 10 mEq tablet 10/1/2018 at Unknown time Child Yes Yes Sig: Take 10 mEq by mouth daily. Patient takes 10 mEq daily and 20 mEq QHS potassium chloride SR (KLOR-CON 10) 10 mEq tablet 10/1/2018 at Unknown time Child Yes Yes Sig: Take 20 mEq by mouth nightly. Patient takes 10 mEq daily and 20 mEq QHS Facility-Administered Medications: None Thank you, 
Eduardo Gallegos CPhT Medication History Pharmacy Technician

## 2018-10-02 NOTE — IP AVS SNAPSHOT
[address] [facility] 
[phone] Patient: Rosi Kline MRN: [id number] KV:[id number] About your hospitalization You were admitted on:  October 2, 2018 You last received care in the:  [facility] GENERAL SURGERY You were discharged on:  October 5, 2018 Why you were hospitalized Your primary diagnosis was:  Rectal Bleeding Your diagnoses also included:  Diverticulosis Follow-up Information Follow up With Details Comments Contact Info Luke Ruiz MD   [facility] 1st floor [facility] [place] [id number] [phone] Discharge Orders None A check veronica indicates which time of day the medication should be taken. My Medications CHANGE how you take these medications Instructions Each Dose to Equal  
 Morning Noon Evening Bedtime * potassium chloride SR 10 mEq tablet Commonly known as:  KLOR-CON 10 What changed:  Another medication with the same name was removed. Continue taking this medication, and follow the directions you see here. Your last dose was: Your next dose is: Take 10 mEq by mouth daily. Patient takes 10 mEq daily and 20 mEq QHS 10 mEq * potassium chloride SR 10 mEq tablet Commonly known as:  KLOR-CON 10 What changed:  Another medication with the same name was removed. Continue taking this medication, and follow the directions you see here. Your last dose was: Your next dose is: Take 20 mEq by mouth nightly. Patient takes 10 mEq daily and 20 mEq QHS 20 mEq * Notice: This list has 2 medication(s) that are the same as other medications prescribed for you. Read the directions carefully, and ask your doctor or other care provider to review them with you. CONTINUE taking these medications  Instructions Each Dose to Equal  
 Morning Noon Evening Bedtime  
 albuterol 90 mcg/actuation inhaler Commonly known as:  PROVENTIL HFA, VENTOLIN HFA, PROAIR HFA Your last dose was: Your next dose is: Take 1-2 Puffs by inhalation every four (4) hours as needed for Wheezing or Shortness of Breath. 1-2 Puff  
    
   
   
   
  
 atorvastatin 40 mg tablet Commonly known as:  LIPITOR Your last dose was: Your next dose is: TAKE 1 TABLET BY MOUTH DAILY  
     
   
   
   
  
 budesonide 180 mcg/actuation Aepb inhaler Commonly known as:  PULMICORT Your last dose was: Your next dose is: Take 2 puffs by inhalation two (2) times a day. 2 Puff  
    
   
   
   
  
 carvedilol 25 mg tablet Commonly known as:  Lorrane Och Your last dose was: Your next dose is: TAKE 1 TABLET BY MOUTH TWICE DAILY WITH MEALS  
     
   
   
   
  
 ciprofloxacin HCl 250 mg tablet Commonly known as:  CIPRO Your last dose was: Your next dose is: Take 125 mg by mouth daily. for UTI prophylaxis 125 mg  
    
   
   
   
  
 FIBER (PSYLLIUM HUSK/SUGAR) 3.4 gram/11 gram Powd Generic drug:  Psyllium Husk-Sucrose Your last dose was: Your next dose is: Take 1 Cap by mouth daily. 1 Cap  
    
   
   
   
  
 furosemide 80 mg tablet Commonly known as:  LASIX Your last dose was: Your next dose is: TAKE 1 TABLET BY MOUTH TWICE DAILY  
     
   
   
   
  
 latanoprost 0.005 % ophthalmic solution Commonly known as:  Mary Lou Gimenez Your last dose was: Your next dose is:    
   
   
 Administer 1 Drop to both eyes nightly. 1 Drop  
    
   
   
   
  
 lisinopril 10 mg tablet Commonly known as:  Jennifer Osei Your last dose was: Your next dose is: TAKE 1 TABLET BY MOUTH DAILY NexIUM 40 mg capsule Generic drug:  esomeprazole Your last dose was: Your next dose is: TAKE ONE CAPSULE BY MOUTH DAILY  
     
   
   
   
  
 nitroglycerin 0.4 mg SL tablet Commonly known as:  NITROSTAT Your last dose was: Your next dose is:    
   
   
 1 Tab by SubLINGual route every five (5) minutes as needed for Chest Pain (Call 911 if not relieved by 3 tablets). 0.4 mg  
    
   
   
   
  
 ONE-A-DAY WOMEN'S 50 PLUS PO Your last dose was: Your next dose is: Take 1 Tab by mouth daily. 1 Tab TYLENOL ARTHRITIS PAIN 650 mg Rosalinda Cyphers Generic drug:  acetaminophen Your last dose was: Your next dose is: Take 650 mg by mouth every six (6) hours as needed for Pain. 650 mg  
    
   
   
   
  
 ZyrTEC 10 mg tablet Generic drug:  cetirizine Your last dose was: Your next dose is: Take 10 mg by mouth daily. 10 mg  
    
   
   
   
  
  
STOP taking these medications   
 aspirin delayed-release 81 mg tablet Discharge Instructions [place] [employer] Announcement We are excited to announce that we are making your provider's discharge notes available to you in Catamaran. You will see these notes when they are completed and signed by the physician that discharged you from your recent hospital stay. If you have any questions or concerns about any information you see in Catamaran, please call the Health Information Department where you were seen or reach out to your Primary Care Provider for more information about your plan of care. Introducing [facility] & HEALTH SERVICES! Janine Calderón introduces [employer] patient portal. Now you can access parts of your medical record, email your doctor's office, and request medication refills online. 1. In your internet browser, go to [email or web address] 2. Click on the First Time User? Click Here link in the Sign In box. You will see the New Member Sign Up page. 3. Enter your Melophone Access Code exactly as it appears below. You will not need to use this code after youve completed the sign-up process. If you do not sign up before the expiration date, you must request a new code. · Melophone Access Code: [email or web address] Expires: 10/25/2018  4:02 PM 
 
4. Enter the last four digits of your Social Security Number (xxxx) and Date of Birth (mm/dd/yyyy) as indicated and click Submit. You will be taken to the next sign-up page. 5. Create a Melophone ID. This will be your Melophone login ID and cannot be changed, so think of one that is secure and easy to remember. 6. Create a Melophone password. You can change your password at any time. 7. Enter your Password Reset Question and Answer. This can be used at a later time if you forget your password. 8. Enter your e-mail address. You will receive e-mail notification when new information is available in [address] 9. Click Sign Up. You can now view and download portions of your medical record. 10. Click the Download Summary menu link to download a portable copy of your medical information. If you have questions, please visit the Frequently Asked Questions section of the Melophone website. Remember, Melophone is NOT to be used for urgent needs. For medical emergencies, dial 911. Now available from your iPhone and Android! Introducing Santiago Chaney As a [employer] patient, I wanted to make you aware of our electronic visit tool called Santiago Chaney. [employer] 24/7 allows you to connect within minutes with a medical provider 24 hours a day, seven days a week via a mobile device or tablet or logging into a secure website from your computer. You can access Santiago Chaney from anywhere in the [place].  
 
A virtual visit might be right for you when you have a simple condition and feel like you just dont want to get out of bed, or cant get away from work for an appointment, when your regular [facility] [employer] [facility] provider is not available (evenings, weekends or holidays), or when youre out of town and need minor care. Electronic visits cost only $49 and if the [facility] 24/7 provider determines a prescription is needed to treat your condition, one can be electronically transmitted to a nearby pharmacy*. Please take a moment to enroll today if you have not already done so. The enrollment process is free and takes just a few minutes. To enroll, please download the [employer] janessa to your tablet or phone, or visit [email or web address] to enroll on your computer. And, as an [address] [place] [address] patient with a Pulse account, the results of your visits will be scanned into your electronic medical record and your primary care provider will be able to view the scanned results. We urge you to continue to see your regular [facility] provider for your ongoing medical care. And while your primary care provider may not be the one available when you seek a [employer] virtual visit, the peace of mind you get from getting a real diagnosis real time can be priceless. For more information on [facility]ram[facility], view our Frequently Asked Questions (FAQs) at [email or web address]. Sincerely, 
 
Wei Millan MD 
Chief Medical Officer [address] *:  certain medications cannot be prescribed via [employer] Providers Seen During Your Hospitalization Provider Specialty Primary office phone Hilda Almeida. Cheko Jackson MD Emergency Medicine [phone] Delonte Mckinney MD Internal Medicine [phone] Paulette Hernandez MD Internal Medicine [phone] Your Primary Care Physician (PCP) Primary Care Physician Office Phone Office Fax Ardith Colquitt [phone] You are allergic to the following Allergen Reactions Contrast Agent (Iodine) Anaphylaxis Made aware of allergy 1/4/13 Sulfa (Sulfonamide Antibiotics) Hives Codeine Anxiety Pcn (Penicillins) Anaphylaxis Reaction was to penicillin injections into buttocks. She can take oral amoxicillin Recent Documentation Weight BMI OB Status Smoking Status 92.5 kg 37.31 kg/m2 Postmenopausal Never Smoker Emergency Contacts Name Discharge Info Relation Home Work Mobile Vika Trujillo DISCHARGE CAREGIVER [3] Child [2] [phone] JohnPearlAmalia DISCHARGE CAREGIVER [3] Child [2] [phone] Patient Belongings The following personal items are in your possession at time of discharge: 
  Dental Appliances: With patient  Visual Aid: Glasses      Home Medications: None   Jewelry: Ring, Watch  Clothing: None    Other Valuables: None Please provide this summary of care documentation to your next provider. Signatures-by signing, you are acknowledging that this After Visit Summary has been reviewed with you and you have received a copy. Patient Signature:  ____________________________________________________________ Date:  ____________________________________________________________  
  
Leslie Guan Provider Signature:  ____________________________________________________________ Date:  ____________________________________________________________

## 2018-10-02 NOTE — ED NOTES
TRANSFER - OUT REPORT: 
 
Verbal report given to [facility] RN(name) on Rosi Kline  being transferred to [facility] (unit) for routine progression of care Report consisted of patients Situation, Background, Assessment and  
Recommendations(SBAR). Information from the following report(s) SBAR, ED Summary, [facility] and Recent Results was reviewed with the receiving nurse. Lines:  
Peripheral IV 10/02/18 Right Antecubital (Active) Site Assessment Clean, dry, & intact 10/2/2018  9:04 AM  
Phlebitis Assessment 0 10/2/2018  9:04 AM  
Infiltration Assessment 0 10/2/2018  9:04 AM  
Dressing Status Clean, dry, & intact 10/2/2018  9:04 AM  
Dressing Type Tape;Transparent 10/2/2018  9:04 AM  
Hub Color/Line Status Pink;Flushed 10/2/2018  9:04 AM  
Action Taken Blood drawn 10/2/2018  9:04 AM  
  
 
Opportunity for questions and clarification was provided.

## 2018-10-02 NOTE — PROGRESS NOTES
Problem: Falls - Risk of 
Goal: *Absence of Falls Document Mirlande Parra Fall Risk and appropriate interventions in the flowsheet. Outcome: Progressing Towards Goal 
Fall Risk Interventions: 
Mobility Interventions: Patient to call before getting OOB Medication Interventions: Patient to call before getting OOB, Teach patient to arise slowly Comments: Monitor stools/ bloody Hemoglobin every 8

## 2018-10-02 NOTE — ED TRIAGE NOTES
Patient arrives to the ED via EMS. Patient reports upon waking this morning she had a bloody BM that was both bright red blood and dark red blood, had one more before calling EMS.

## 2018-10-02 NOTE — PROGRESS NOTES
Problem: Falls - Risk of 
Goal: *Absence of Falls Document Lorraine Shadow Fall Risk and appropriate interventions in the flowsheet. Outcome: Progressing Towards Goal 
Fall Risk Interventions: 
Mobility Interventions: Patient to call before getting OOB Medication Interventions: Patient to call before getting OOB, Teach patient to arise slowly Comments: Bloody stool Pacer 2LNC 
 
3 medium-large loose clotty BM's

## 2018-10-02 NOTE — IP AVS SNAPSHOT
[address] [place] [address] 
[phone] Patient: Surendra Childers MRN: [id number] LISA:[id number] A check veronica indicates which time of day the medication should be taken. My Medications CHANGE how you take these medications Instructions Each Dose to Equal  
 Morning Noon Evening Bedtime * potassium chloride SR 10 mEq tablet Commonly known as:  KLOR-CON 10 What changed:  Another medication with the same name was removed. Continue taking this medication, and follow the directions you see here. Your last dose was: Your next dose is: Take 10 mEq by mouth daily. Patient takes 10 mEq daily and 20 mEq QHS 10 mEq * potassium chloride SR 10 mEq tablet Commonly known as:  KLOR-CON 10 What changed:  Another medication with the same name was removed. Continue taking this medication, and follow the directions you see here. Your last dose was: Your next dose is: Take 20 mEq by mouth nightly. Patient takes 10 mEq daily and 20 mEq QHS 20 mEq * Notice: This list has 2 medication(s) that are the same as other medications prescribed for you. Read the directions carefully, and ask your doctor or other care provider to review them with you. CONTINUE taking these medications Instructions Each Dose to Equal  
 Morning Noon Evening Bedtime  
 albuterol 90 mcg/actuation inhaler Commonly known as:  PROVENTIL HFA, VENTOLIN HFA, PROAIR HFA Your last dose was: Your next dose is: Take 1-2 Puffs by inhalation every four (4) hours as needed for Wheezing or Shortness of Breath. 1-2 Puff  
    
   
   
   
  
 atorvastatin 40 mg tablet Commonly known as:  LIPITOR Your last dose was: Your next dose is: TAKE 1 TABLET BY MOUTH DAILY budesonide 180 mcg/actuation Aepb inhaler Commonly known as:  PULMICORT Your last dose was: Your next dose is: Take 2 puffs by inhalation two (2) times a day. 2 Puff  
    
   
   
   
  
 carvedilol 25 mg tablet Commonly known as:  Vandana Robby Your last dose was: Your next dose is: TAKE 1 TABLET BY MOUTH TWICE DAILY WITH MEALS  
     
   
   
   
  
 ciprofloxacin HCl 250 mg tablet Commonly known as:  CIPRO Your last dose was: Your next dose is: Take 125 mg by mouth daily. for UTI prophylaxis 125 mg  
    
   
   
   
  
 FIBER (PSYLLIUM HUSK/SUGAR) 3.4 gram/11 gram Powd Generic drug:  Psyllium Husk-Sucrose Your last dose was: Your next dose is: Take 1 Cap by mouth daily. 1 Cap  
    
   
   
   
  
 furosemide 80 mg tablet Commonly known as:  LASIX Your last dose was: Your next dose is: TAKE 1 TABLET BY MOUTH TWICE DAILY  
     
   
   
   
  
 latanoprost 0.005 % ophthalmic solution Commonly known as:  Effie Rodriguez Your last dose was: Your next dose is:    
   
   
 Administer 1 Drop to both eyes nightly. 1 Drop  
    
   
   
   
  
 lisinopril 10 mg tablet Commonly known as:  Ansley Rodriguez Your last dose was: Your next dose is: TAKE 1 TABLET BY MOUTH DAILY NexIUM 40 mg capsule Generic drug:  esomeprazole Your last dose was: Your next dose is: TAKE ONE CAPSULE BY MOUTH DAILY  
     
   
   
   
  
 nitroglycerin 0.4 mg SL tablet Commonly known as:  NITROSTAT Your last dose was: Your next dose is:    
   
   
 1 Tab by SubLINGual route every five (5) minutes as needed for Chest Pain (Call 911 if not relieved by 3 tablets). 0.4 mg  
    
   
   
   
  
 ONE-A-DAY WOMEN'S 50 PLUS PO Your last dose was: Your next dose is: Take 1 Tab by mouth daily. 1 Tab TYLENOL ARTHRITIS PAIN 650 mg Koffi Diones Generic drug:  acetaminophen Your last dose was: Your next dose is: Take 650 mg by mouth every six (6) hours as needed for Pain. 650 mg  
    
   
   
   
  
 ZyrTEC 10 mg tablet Generic drug:  cetirizine Your last dose was: Your next dose is: Take 10 mg by mouth daily. 10 mg  
    
   
   
   
  
  
STOP taking these medications   
 aspirin delayed-release 81 mg tablet

## 2018-10-02 NOTE — PROGRESS NOTES
Called GI specialist Dr. Chris Paul office 80 - Notified Dr. Jane Hernandez of patient's stool x 2 clotted (lumpy) medium amount. /93. Dr. Jane Hernandez reported that it was high in the ER.  He reports it is r/t diverticulum, that we are giving IVF and monitoring H

## 2018-10-02 NOTE — ED PROVIDER NOTES
EMERGENCY DEPARTMENT HISTORY AND PHYSICAL EXAM 
 
 
Date: 10/2/2018 Patient Name: Surendra Childers History of Presenting Illness Chief Complaint Patient presents with  Rectal Bleeding  
  started this morning History Provided By: Patient and Patient's Daughter HPI: Surendra Childers, 80 y.o. female with PMHx significant for CAD, HTN, asthma, presents via EMS to the ED with cc of acute onset rectal bleeding that may have been mixed with stool that began around 6:30 AM. Per daughter, she was unable to identify any blood clots. The pt notes mild SOB and adds that she is on 2.5 L O2 at home. The patient reports that she has mild chest pain from her pacemaker. She states that she was seen by a physician last week and had her pacemaker inspected. However, she conveys that her pacemaker \"hasn't felt right\" since then. Per daughter, the pt was feeling unwell last night but did not inform her about her symptoms until this morning. The pt relays that she took Tylenol last night to mild alleviation. Daughter notes the pt is on 81 mg aspirin daily. She specifically denies any abdominal pain, N/V, fevers and chills. There are no other complaints, changes, or physical findings at this time. PCP: Shimon Rajput MD 
 
Current Facility-Administered Medications Medication Dose Route Frequency Provider Last Rate Last Dose  sodium chloride (NS) flush 5-10 mL  5-10 mL IntraVENous Q8H Chester Rodriguez MD      
 sodium chloride (NS) flush 5-10 mL  5-10 mL IntraVENous PRN Chester Rodriguez MD      
 acetaminophen (TYLENOL) tablet 650 mg  650 mg Oral Q6H PRN Chester Rodriguez MD      
 ondansetron [facility]) injection 4 mg  4 mg IntraVENous Q6H PRN MD Lalita Blanchard ON 10/3/2018] potassium chloride SR (KLOR-CON 10) tablet 10 mEq  10 mEq Oral DAILY Chester Rodriguez MD      
 potassium chloride SR (KLOR-CON 10) tablet 20 mEq  20 mEq Oral QHS Chester Rodriguez MD      
  furosemide (LASIX) tablet 80 mg  80 mg Oral ACB&D Hardik Wiley MD      
 nitroglycerin (NITROSTAT) tablet 0.4 mg  0.4 mg SubLINGual Q5MIN PRN Hardik Wiley MD      
 carvedilol (COREG) tablet 25 mg  25 mg Oral BID WITH MEALS MD Lalita Bennettaldo Doing ON 10/3/2018] atorvastatin (LIPITOR) tablet 40 mg  40 mg Oral DAILY Hardik Wiley MD      
 [START ON 10/3/2018] lisinopril (PRINIVIL, ZESTRIL) tablet 10 mg  10 mg Oral DAILY Hardik Wiley MD      
 [START ON 10/3/2018] pantoprazole (PROTONIX) tablet 40 mg  40 mg Oral ACB Hardik Wiley MD      
 budesonide (PULMICORT FLEXHALER) 180 mcg/puff  2 Puff Inhalation BID Hardik Wiley MD      
 ciprofloxacin HCl (CIPRO) tablet 125 mg  125 mg Oral DAILY Hardik Wiley MD      
 cetirizine (ZYRTEC) tablet 10 mg  10 mg Oral DAILY Hardik Wiley MD      
 latanoprost (XALATAN) 0.005 % ophthalmic solution 1 Drop  1 Drop Both Eyes QHS Hardik Wiley MD      
 
Current Outpatient Prescriptions Medication Sig Dispense Refill  potassium chloride SR (KLOR-CON 10) 10 mEq tablet Take 10 mEq by mouth daily. Patient takes 10 mEq daily and 20 mEq QHS  potassium chloride SR (KLOR-CON 10) 10 mEq tablet Take 20 mEq by mouth nightly. Patient takes 10 mEq daily and 20 mEq QHS  albuterol (PROVENTIL HFA, VENTOLIN HFA, PROAIR HFA) 90 mcg/actuation inhaler Take 1-2 Puffs by inhalation every four (4) hours as needed for Wheezing or Shortness of Breath.  Psyllium Husk-Sucrose (FIBER, PSYLLIUM HUSK/SUGAR,) 3.4 gram/11 gram powd Take 1 Cap by mouth daily.  mv-mn/folic acid/calcium/vit K (ONE-A-DAY WOMEN'S 50 PLUS PO) Take 1 Tab by mouth daily.  furosemide (LASIX) 80 mg tablet TAKE 1 TABLET BY MOUTH TWICE DAILY 180 Tab 0  
 nitroglycerin (NITROSTAT) 0.4 mg SL tablet 1 Tab by SubLINGual route every five (5) minutes as needed for Chest Pain (Call 911 if not relieved by 3 tablets).  50 Tab 1  
  carvedilol (COREG) 25 mg tablet TAKE 1 TABLET BY MOUTH TWICE DAILY WITH MEALS 60 Tab 6  
 atorvastatin (LIPITOR) 40 mg tablet TAKE 1 TABLET BY MOUTH DAILY 90 Tab 3  
 lisinopril (PRINIVIL, ZESTRIL) 10 mg tablet TAKE 1 TABLET BY MOUTH DAILY 90 Tab 3  
 NEXIUM 40 mg capsule TAKE ONE CAPSULE BY MOUTH DAILY 30 Cap 0  
 aspirin delayed-release 81 mg tablet Take 81 mg by mouth daily.  ciprofloxacin (CIPRO) 250 mg tablet Take 125 mg by mouth daily. for UTI prophylaxis  acetaminophen (TYLENOL ARTHRITIS PAIN) 650 mg CR tablet Take 650 mg by mouth every six (6) hours as needed for Pain.  latanoprost (XALATAN) 0.005 % ophthalmic solution Administer 1 Drop to both eyes nightly.  cetirizine (ZYRTEC) 10 mg tablet Take 10 mg by mouth daily.  budesonide (PULMICORT) 180 mcg/actuation aepb inhaler Take 2 puffs by inhalation two (2) times a day. Facility-Administered Medications Ordered in Other Encounters Medication Dose Route Frequency Provider Last Rate Last Dose  ADDaptor  vancomycin (VANCOCIN) 1,000 mg injection  0.9% sodium chloride (MBP/ADV) 0.9 % infusion  bacitracin 50,000 unit injection Past History Past Medical History: 
Past Medical History:  
Diagnosis Date  Asthma  Autoimmune disease ([facility] [address] lupus  CAD (coronary artery disease)   
 cardiac cath  CAD (coronary artery disease) sick sinus syndrome  Essential hypertension  GERD (gastroesophageal reflux disease)  Glaucoma  Heart failure ([facility] [address] Lupus  Hypertension  Other ill-defined conditions(799.49)  Pacemaker  PUD (peptic ulcer disease)  S/P implantation of automatic cardioverter/defibrillator (AICD) 1/14/2015 [address] upgrade to AICD implant  SSS (sick sinus syndrome) ([facility] [address] Past Surgical History: 
Past Surgical History:  
Procedure Laterality Date  CARDIAC SURG PROCEDURE UNLIST    
 pacemaker/defibrilator.  HX GYN    
 BTL  
 HX PACEMAKER    
 SC COLONOSCOPY W/BIOPSY SINGLE/MULTIPLE  3/26/2012  SC COLSC FLX W/RMVL OF TUMOR POLYP LESION SNARE TQ  3/26/2012 Family History: 
Family History Problem Relation Age of Onset  Arrhythmia Mother  Hypertension Mother  Hypertension Father Social History: 
Social History Substance Use Topics  Smoking status: Never Smoker  Smokeless tobacco: Never Used  Alcohol use No  
 
 
Allergies: Allergies Allergen Reactions  Contrast Agent [Iodine] Anaphylaxis Made aware of allergy 1/4/13  Sulfa (Sulfonamide Antibiotics) Hives  Codeine Anxiety  Pcn [Penicillins] Anaphylaxis Reaction was to penicillin injections into buttocks. She can take oral amoxicillin Review of Systems Review of Systems Constitutional: Negative for activity change, appetite change, chills, fever and unexpected weight change. HENT: Negative for congestion. Eyes: Negative for pain and visual disturbance. Respiratory: Positive for shortness of breath. Negative for cough. Cardiovascular: Negative for chest pain. Gastrointestinal: Positive for blood in stool and rectal pain. Negative for abdominal pain, diarrhea, nausea and vomiting. Genitourinary: Negative for dysuria. Musculoskeletal: Negative for back pain. Skin: Negative for rash. Neurological: Negative for headaches. Physical Exam  
Physical Exam  
Constitutional: She is oriented to person, place, and time. She appears well-developed and well-nourished. Elderly female in mild distress on home oxygen HENT:  
Head: Normocephalic and atraumatic. Mouth/Throat: Oropharynx is clear and moist.  
Eyes: Conjunctivae and EOM are normal. Pupils are equal, round, and reactive to light. Right eye exhibits no discharge. Left eye exhibits no discharge. Neck: Normal range of motion. Neck supple. Cardiovascular: Normal rate, regular rhythm, normal heart sounds and intact distal pulses. Exam reveals no friction rub. No murmur heard. Pulmonary/Chest: Effort normal and breath sounds normal. No respiratory distress. She has no wheezes. She has no rales. She exhibits no tenderness. Pacemaker Abdominal: Soft. Bowel sounds are normal. She exhibits no distension and no mass. There is no tenderness. There is no rebound and no guarding. Genitourinary: Rectal exam shows external hemorrhoid. Rectal exam shows no internal hemorrhoid, no mass, no tenderness and anal tone normal.  
Genitourinary Comments: Hemorrhoids x2 external, not thrombosed, do not appear to be bleeding. Acute BRB poring from rectum with any position movement Musculoskeletal: Normal range of motion. She exhibits no edema. Neurological: She is alert and oriented to person, place, and time. No cranial nerve deficit. She exhibits normal muscle tone. Skin: Skin is warm and dry. No rash noted. She is not diaphoretic. Nursing note and vitals reviewed. Diagnostic Study Results Labs - Recent Results (from the past 12 hour(s)) CBC WITH AUTOMATED DIFF Collection Time: 10/02/18  8:59 AM  
Result Value Ref Range WBC 6.3 3.6 - 11.0 K/uL  
 RBC 3.45 (L) 3.80 - 5.20 M/uL HGB 9.5 (L) 11.5 - 16.0 g/dL HCT 32.2 (L) 35.0 - 47.0 % MCV 93.3 80.0 - 99.0 FL  
 MCH 27.5 26.0 - 34.0 PG  
 MCHC 29.5 (L) 30.0 - 36.5 g/dL  
 RDW 12.9 11.5 - 14.5 % PLATELET 083 685 - 219 K/uL MPV 8.9 8.9 - 12.9 FL  
 NRBC 0.0 0  WBC ABSOLUTE NRBC 0.00 0.00 - 0.01 K/uL NEUTROPHILS 67 32 - 75 % LYMPHOCYTES 16 12 - 49 % MONOCYTES 13 5 - 13 % EOSINOPHILS 3 0 - 7 % BASOPHILS 1 0 - 1 % IMMATURE GRANULOCYTES 1 (H) 0.0 - 0.5 % ABS. NEUTROPHILS 4.3 1.8 - 8.0 K/UL  
 ABS. LYMPHOCYTES 1.0 0.8 - 3.5 K/UL  
 ABS. MONOCYTES 0.8 0.0 - 1.0 K/UL  
 ABS. EOSINOPHILS 0.2 0.0 - 0.4 K/UL  
 ABS. BASOPHILS 0.0 0.0 - 0.1 K/UL ABS. IMM. GRANS. 0.0 0.00 - 0.04 K/UL  
 DF AUTOMATED    
TYPE & SCREEN Collection Time: 10/02/18  8:59 AM  
Result Value Ref Range Crossmatch Expiration 10/05/2018 ABO/Rh(D) O POSITIVE Antibody screen NEG PROTHROMBIN TIME + INR Collection Time: 10/02/18  8:59 AM  
Result Value Ref Range INR 1.1 0.9 - 1.1 Prothrombin time 11.1 9.0 - 11.1 sec METABOLIC PANEL, COMPREHENSIVE Collection Time: 10/02/18  8:59 AM  
Result Value Ref Range Sodium 139 136 - 145 mmol/L Potassium 4.3 3.5 - 5.1 mmol/L Chloride 98 97 - 108 mmol/L  
 CO2 39 (H) 21 - 32 mmol/L Anion gap 2 (L) 5 - 15 mmol/L Glucose 94 65 - 100 mg/dL BUN 13 6 - 20 MG/DL Creatinine 0.91 0.55 - 1.02 MG/DL  
 BUN/Creatinine ratio 14 12 - 20 GFR est AA >60 >60 ml/min/1.73m2 GFR est non-AA 59 (L) >60 ml/min/1.73m2 Calcium 8.4 (L) 8.5 - 10.1 MG/DL Bilirubin, total 0.7 0.2 - 1.0 MG/DL  
 ALT (SGPT) 17 12 - 78 U/L  
 AST (SGOT) 16 15 - 37 U/L Alk. phosphatase 80 45 - 117 U/L Protein, total 8.0 6.4 - 8.2 g/dL Albumin 3.6 3.5 - 5.0 g/dL Globulin 4.4 (H) 2.0 - 4.0 g/dL A-G Ratio 0.8 (L) 1.1 - 2.2 Medical Decision Making I am the first provider for this patient. I reviewed the vital signs, available nursing notes, past medical history, past surgical history, family history and social history. Vital Signs-Reviewed the patient's vital signs. Patient Vitals for the past 12 hrs: 
 Temp Pulse Resp BP SpO2  
10/02/18 1200 98.1 °F (36.7 °C) 86 - 158/89 97 % 10/02/18 1116 - - - (!) 159/98 92 % 10/02/18 1030 - - - (!) 154/93 100 % 10/02/18 1000 - - - 148/74 100 % 10/02/18 0930 - - - 149/80 98 % 10/02/18 0830 - - - 156/88 96 % 10/02/18 0827 98.2 °F (36.8 °C) 89 20 143/88 97 % Pulse Oximetry Analysis - 97% on NC Cardiac Monitor:  
Rate: 89 bpm 
Rhythm: Normal Sinus Rhythm Records Reviewed: Nursing Notes, Old Medical Records and Ambulance Run Sheet Provider Notes (Medical Decision Making):  
Elderly female with acute rectal bleeding. VS current -wnl. Concern for brisk bleed based on rectal exam. Unable to obtain CTA due to contrast allergy. Attempting to call radiology for recommendations. ED Course:  
Initial assessment performed. The patients presenting problems have been discussed, and they are in agreement with the care plan formulated and outlined with them. I have encouraged them to ask questions as they arise throughout their visit. Procedure Note - Rectal Exam:  
8:49 AM 
Performed by: Gris Mauro MD 
Rectal exam performed. No stool was collected. Pt has acute, pouring blood. The procedure took 1-15 minutes, and pt tolerated well. CONSULT NOTE:  
9:57 AM 
Gris Mauro MD spoke with Branden Woodson MD 
Specialty: Hospitalist 
Discussed pt's hx, disposition, and available diagnostic and imaging results. Reviewed care plans. Consultant will evaluate pt for admission. She will see the pt Written by Moon Womack ED Scribe, as dictated by Gris Mauro MD. 
 
PROGRESS NOTE: 
10:33 AM 
GI was re-paged as there was no response for 45 minutes. Was informed that Dr. Nanette James is currently in a procedure and will call back for the consult as soon as he can. CONSULT NOTE: 
10:39 AM 
Gris Mauro MD spoke with Naomi Dance, MD 
Specialty: GI  
Discussed pt's hx, disposition, and available diagnostic and imaging results. Reviewed care plans. Consultant recommends not to do anything for the pt. He will see her in the hospital.  
 
CONSULT NOTE:  
9:57 AM 
Gris Mauro MD spoke with Branden Woodson MD  
Specialty: Hospitalist 
Discussed pt's hx, disposition, and available diagnostic and imaging results. Reviewed care plans. Consultant will evaluate pt for admission. Written by Moon Womack ED Scribe, as dictated by Gris Mauro MD. Critical Care Time:  
0 Disposition: 
Admit Note: 
9:58 AM 
 Pt is being admitted by Dr. Surya Carter. The results of their tests and reason(s) for their admission have been discussed with pt and/or available family. They convey agreement and understanding for the need to be admitted and for admission diagnosis. PLAN: 
1. Admit to Hospitalist 
 
Diagnosis Clinical Impression: 1. Acute lower GI bleeding Attestations: This note is prepared by Hortencia Chopra, acting as Scribe for MD Carmen Lala MD: The scribe's documentation has been prepared under my direction and personally reviewed by me in its entirety. I confirm that the note above accurately reflects all work, treatment, procedures, and medical decision making performed by me.

## 2018-10-02 NOTE — CONSULTS
GI Consultation Note Nevaeh Hernandez) NAME: Munir Sauer : 1934 MRN: [id number] ATTG: Hospitalist PCP: Oliver Heimlich, MD 
Date/Time:  10/2/2018 11:32 AM 
Subjective:  
REASON FOR CONSULT:     
Mayank Brandon is a 80 y.o.  female with hx CAD, HTN, asthma, and known diverticulosis, who I was asked to see for evaluation of lower GI bleeding. She is admitted today via ER on presentation with acute onset BRBPR followed later by clots, with no associated or preceding abd pain or cramping, beginning this AM.  She denies N/V, prior change in [facility], D/C, F/C, CP, or increased SOB from baseline. She had had recent AICD/pacemaker interrogation last week. She states she \"hasn't felt right\" since then and self treated with acetaminophen without improvement. She is on daily ASA, but denies any NSAIDs or anticoagulation.   ER eval notes hypertension (SBP 150s) and no tachycardia but she is on carvedilol. Her baseline Hgb is 9 and was unchanged here with nl BUN:Cr. HEATH notes external hemorrhoids without thrombosis, but copious unrelated BRBPR. Last colonoscopy was completed 3/26/12 noting sigmoid and descending colon diverticulosis, bx-confirmed mild descending colon diverticulosis, two ascending colon adenomas removed at that time, and Grade 2 internal hemorrhoids. She is noted to be allergic to IV contrast dye. Past Medical History:  
Diagnosis Date  Asthma  Autoimmune disease ([facility] [address] lupus  CAD (coronary artery disease)   
 cardiac cath  CAD (coronary artery disease) sick sinus syndrome  Essential hypertension  GERD (gastroesophageal reflux disease)  Glaucoma  Heart failure ([facility] [address] Lupus  Hypertension  Other ill-defined conditions(799.89)  Pacemaker  PUD (peptic ulcer disease)  S/P implantation of automatic cardioverter/defibrillator (AICD) 2015 [address] upgrade to AICD implant  SSS (sick sinus syndrome) ([facility] [address] Past Surgical History:  
Procedure Laterality Date  CARDIAC SURG PROCEDURE UNLIST    
 pacemaker/defibrilator.  HX GYN    
 BTL  
 HX PACEMAKER    
 [place] COLONOSCOPY W/BIOPSY SINGLE/MULTIPLE  3/26/2012  [place] COLSC FLX W/RMVL OF TUMOR POLYP LESION SNARE TQ  3/26/2012 Social History Substance Use Topics  Smoking status: Never Smoker  Smokeless tobacco: Never Used  Alcohol use No  
  
Family History Problem Relation Age of Onset  Arrhythmia Mother  Hypertension Mother  Hypertension Father Allergies Allergen Reactions  Contrast Agent [Iodine] Anaphylaxis Made aware of allergy 1/4/13  Sulfa (Sulfonamide Antibiotics) Hives  Codeine Anxiety  Pcn [Penicillins] Anaphylaxis Reaction was to penicillin injections into buttocks. She can take oral amoxicillin Home Medications: 
Prior to Admission Medications Prescriptions Last Dose Informant Patient Reported? Taking? NEXIUM 40 mg capsule 10/1/2018 at Unknown time Child No Yes Sig: TAKE ONE CAPSULE BY MOUTH DAILY Psyllium Husk-Sucrose (FIBER, PSYLLIUM HUSK/SUGAR,) 3.4 gram/11 gram powd 10/1/2018 at Unknown time Child Yes Yes Sig: Take 1 Cap by mouth daily. acetaminophen (TYLENOL ARTHRITIS PAIN) 650 mg CR tablet 10/1/2018 at Unknown time Child Yes Yes Sig: Take 650 mg by mouth every six (6) hours as needed for Pain. albuterol (PROVENTIL HFA, VENTOLIN HFA, PROAIR HFA) 90 mcg/actuation inhaler 9/25/2018 at Unknown time Child Yes Yes Sig: Take 1-2 Puffs by inhalation every four (4) hours as needed for Wheezing or Shortness of Breath. aspirin delayed-release 81 mg tablet 10/1/2018 at Unknown time Child Yes Yes Sig: Take 81 mg by mouth daily. atorvastatin (LIPITOR) 40 mg tablet 10/1/2018 at Unknown time Child No Yes Sig: TAKE 1 TABLET BY MOUTH DAILY  
budesonide (PULMICORT) 180 mcg/actuation aepb inhaler Not Taking at Unknown time Child Yes No  
 Sig: Take 2 puffs by inhalation two (2) times a day. carvedilol (COREG) 25 mg tablet 10/1/2018 at Unknown time Child No Yes Sig: TAKE 1 TABLET BY MOUTH TWICE DAILY WITH MEALS  
cetirizine (ZYRTEC) 10 mg tablet 10/1/2018 at Unknown time Child Yes Yes Sig: Take 10 mg by mouth daily. ciprofloxacin (CIPRO) 250 mg tablet 10/1/2018 at Unknown time Child Yes Yes Sig: Take 125 mg by mouth daily. for UTI prophylaxis  
furosemide (LASIX) 80 mg tablet 10/1/2018 at Unknown time Child No Yes Sig: TAKE 1 TABLET BY MOUTH TWICE DAILY  
latanoprost (XALATAN) 0.005 % ophthalmic solution 10/1/2018 at Unknown time Child Yes Yes Sig: Administer 1 Drop to both eyes nightly. lisinopril (PRINIVIL, ZESTRIL) 10 mg tablet 10/1/2018 at Unknown time Child No Yes Sig: TAKE 1 TABLET BY MOUTH DAILY  
mv-mn/folic acid/calcium/vit K (ONE-A-DAY WOMEN'S 50 PLUS PO) 10/1/2018 at Unknown time Child Yes Yes Sig: Take 1 Tab by mouth daily. nitroglycerin (NITROSTAT) 0.4 mg SL tablet 2018 at Unknown time Child No Yes Si Tab by SubLINGual route every five (5) minutes as needed for Chest Pain (Call 911 if not relieved by 3 tablets). potassium chloride SR (KLOR-CON 10) 10 mEq tablet 10/1/2018 at Unknown time Child Yes Yes Sig: Take 10 mEq by mouth daily. Patient takes 10 mEq daily and 20 mEq QHS potassium chloride SR (KLOR-CON 10) 10 mEq tablet 10/1/2018 at Unknown time Child Yes Yes Sig: Take 20 mEq by mouth nightly. Patient takes 10 mEq daily and 20 mEq QHS Facility-Administered Medications: None Hospital medications: No current facility-administered medications for this encounter. Current Outpatient Prescriptions Medication Sig  potassium chloride SR (KLOR-CON 10) 10 mEq tablet Take 10 mEq by mouth daily. Patient takes 10 mEq daily and 20 mEq QHS  potassium chloride SR (KLOR-CON 10) 10 mEq tablet Take 20 mEq by mouth nightly. Patient takes 10 mEq daily and 20 mEq QHS  albuterol (PROVENTIL HFA, VENTOLIN HFA, PROAIR HFA) 90 mcg/actuation inhaler Take 1-2 Puffs by inhalation every four (4) hours as needed for Wheezing or Shortness of Breath.  Psyllium Husk-Sucrose (FIBER, PSYLLIUM HUSK/SUGAR,) 3.4 gram/11 gram powd Take 1 Cap by mouth daily.  mv-mn/folic acid/calcium/vit K (ONE-A-DAY WOMEN'S 50 PLUS PO) Take 1 Tab by mouth daily.  furosemide (LASIX) 80 mg tablet TAKE 1 TABLET BY MOUTH TWICE DAILY  nitroglycerin (NITROSTAT) 0.4 mg SL tablet 1 Tab by SubLINGual route every five (5) minutes as needed for Chest Pain (Call 911 if not relieved by 3 tablets).  carvedilol (COREG) 25 mg tablet TAKE 1 TABLET BY MOUTH TWICE DAILY WITH MEALS  
 atorvastatin (LIPITOR) 40 mg tablet TAKE 1 TABLET BY MOUTH DAILY  lisinopril (PRINIVIL, ZESTRIL) 10 mg tablet TAKE 1 TABLET BY MOUTH DAILY  NEXIUM 40 mg capsule TAKE ONE CAPSULE BY MOUTH DAILY  aspirin delayed-release 81 mg tablet Take 81 mg by mouth daily.  ciprofloxacin (CIPRO) 250 mg tablet Take 125 mg by mouth daily. for UTI prophylaxis  acetaminophen (TYLENOL ARTHRITIS PAIN) 650 mg CR tablet Take 650 mg by mouth every six (6) hours as needed for Pain.  latanoprost (XALATAN) 0.005 % ophthalmic solution Administer 1 Drop to both eyes nightly.  cetirizine (ZYRTEC) 10 mg tablet Take 10 mg by mouth daily.  budesonide (PULMICORT) 180 mcg/actuation aepb inhaler Take 2 puffs by inhalation two (2) times a day. Facility-Administered Medications Ordered in Other Encounters Medication Dose Route Frequency  ADDaptor  vancomycin (VANCOCIN) 1,000 mg injection  0.9% sodium chloride (MBP/ADV) 0.9 % infusion  bacitracin 50,000 unit injection REVIEW OF SYSTEMS:   
 [x]    Total of 11 systems reviewed as follows: 
Const:   negative fever, negative chills, negative weight loss Eyes:   negative diplopia or visual changes, negative eye pain ENT:   negative coryza, negative sore throat Resp:   negative cough, hemoptysis, dyspnea Cards:  negative for chest pain, palpitations, lower extremity edema :  negative for frequency, dysuria and hematuria Skin:   negative for rash and pruritus Heme:   negative for easy bruising and gum/nose bleeding MS:  negative for myalgias, arthralgias, back pain and muscle weakness Neurolo:  negative for headaches, dizziness, vertigo, memory problems Psych:  negative for feelings of anxiety, depression Pertinent Positives include : 
 
Objective: VITALS:   
Visit Vitals  BP (!) 159/98  Pulse 89  Temp 98.2 °F (36.8 °C)  Resp 20  Wt 92.5 kg (204 lb)  SpO2 92%  BMI 37.31 kg/m2 Temp (24hrs), Av.2 °F (36.8 °C), Min:98.2 °F (36.8 °C), Max:98.2 °F (36.8 °C) PHYSICAL EXAM:  
General:    Alert, cooperative, no distress, appears stated age. Head:   Normocephalic, without obvious abnormality, atraumatic. Eyes:   Conjunctivae clear, anicteric sclerae. Pupils are equal 
Nose:  Nares normal. No drainage or sinus tenderness. Throat:    Lips, mucosa, and tongue normal. +upper/lower plates Neck:  Supple, symmetrical,  no adenopathy, thyroid: non tender Back:    Symmetric,  No CVA tenderness. Lungs:   CTA bilaterally. No wheezing/rhonchi/rales. Chest wall:  No tenderness. +AICD on L. No Accessory muscle use. Heart:   Regular rate and rhythm,  no murmur, rub or gallop. Abdomen:   Soft, non-tender. Not distended. NABS. No masses Extremities: Atraumatic, No cyanosis. No edema. No clubbing Skin:     Texture, turgor normal. No rashes/lesions/jaundice Lymph: Cervical, supraclavicular normal. 
Psych:  Good insight. Not depressed. Not anxious or agitated. Neurologic: EOMs intact. Normal  strength, A/O X 3. LAB DATA REVIEWED:   
Recent Results (from the past 48 hour(s)) CBC WITH AUTOMATED DIFF Collection Time: 10/02/18  8:59 AM  
Result Value Ref Range WBC 6.3 3.6 - 11.0 K/uL  
 RBC 3.45 (L) 3.80 - 5.20 M/uL HGB 9.5 (L) 11.5 - 16.0 g/dL HCT 32.2 (L) 35.0 - 47.0 % MCV 93.3 80.0 - 99.0 FL  
 MCH 27.5 26.0 - 34.0 PG  
 MCHC 29.5 (L) 30.0 - 36.5 g/dL  
 RDW 12.9 11.5 - 14.5 % PLATELET 458 450 - 453 K/uL MPV 8.9 8.9 - 12.9 FL  
 NRBC 0.0 0  WBC ABSOLUTE NRBC 0.00 0.00 - 0.01 K/uL NEUTROPHILS 67 32 - 75 % LYMPHOCYTES 16 12 - 49 % MONOCYTES 13 5 - 13 % EOSINOPHILS 3 0 - 7 % BASOPHILS 1 0 - 1 % IMMATURE GRANULOCYTES 1 (H) 0.0 - 0.5 % ABS. NEUTROPHILS 4.3 1.8 - 8.0 K/UL  
 ABS. LYMPHOCYTES 1.0 0.8 - 3.5 K/UL  
 ABS. MONOCYTES 0.8 0.0 - 1.0 K/UL  
 ABS. EOSINOPHILS 0.2 0.0 - 0.4 K/UL  
 ABS. BASOPHILS 0.0 0.0 - 0.1 K/UL  
 ABS. IMM. GRANS. 0.0 0.00 - 0.04 K/UL  
 DF AUTOMATED    
TYPE & SCREEN Collection Time: 10/02/18  8:59 AM  
Result Value Ref Range Crossmatch Expiration 10/05/2018 ABO/Rh(D) O POSITIVE Antibody screen NEG PROTHROMBIN TIME + INR Collection Time: 10/02/18  8:59 AM  
Result Value Ref Range INR 1.1 0.9 - 1.1 Prothrombin time 11.1 9.0 - 11.1 sec METABOLIC PANEL, COMPREHENSIVE Collection Time: 10/02/18  8:59 AM  
Result Value Ref Range Sodium 139 136 - 145 mmol/L Potassium 4.3 3.5 - 5.1 mmol/L Chloride 98 97 - 108 mmol/L  
 CO2 39 (H) 21 - 32 mmol/L Anion gap 2 (L) 5 - 15 mmol/L Glucose 94 65 - 100 mg/dL BUN 13 6 - 20 MG/DL Creatinine 0.91 0.55 - 1.02 MG/DL  
 BUN/Creatinine ratio 14 12 - 20 GFR est AA >60 >60 ml/min/1.73m2 GFR est non-AA 59 (L) >60 ml/min/1.73m2 Calcium 8.4 (L) 8.5 - 10.1 MG/DL Bilirubin, total 0.7 0.2 - 1.0 MG/DL  
 ALT (SGPT) 17 12 - 78 U/L  
 AST (SGOT) 16 15 - 37 U/L Alk. phosphatase 80 45 - 117 U/L Protein, total 8.0 6.4 - 8.2 g/dL Albumin 3.6 3.5 - 5.0 g/dL Globulin 4.4 (H) 2.0 - 4.0 g/dL A-G Ratio 0.8 (L) 1.1 - 2.2 IMAGING RESULTS: 
 [x]     none Recommendations/Plan: Active Problems: * No active hospital problems.  * 
  
 ___________________________________________________ RECOMMENDATIONS:   
81yo F with painless LV BRBPR and subsequent clots, c/w LGIB. She is not hemodynamically compromised and Hgb may not have changed yet given accuity of presentation. DDx for bleeding source includes, but is not limited to, diverticulosis, AVM, internal hemorrhoids. Less likely is this from ischemia given absent pain, or from polyps given volume and acuity. Plan: 
1) IVF 2) Type and screen 3) Maintain 2 peripheral IVs at all time 4) Allow clear liquid diet 5) If continued bleeding noted, could localize bleeding with nuclear med bleeding scan 6) Will manage conservatively and hold off on colonoscopy at this time. 7) CTA not option given dye allergy 8) Follow H/H q12hrs and transfuse PRN to keep Hgb >7 
___________________________________________________ Care Plan discussed with: 
  [x]    Patient   [x]    Family   [x]    Nursing   [x]    Attending Total Time :  50   minutes 
 ___________________________________________________ GI: Rickie Cole MD

## 2018-10-02 NOTE — PROGRESS NOTES
Physical Therapy Screening: 
Services are not indicated at this time. An InBasket screening referral was triggered for physical therapy based on results obtained during the nursing admission assessment. The patients chart was reviewed and the patient is not appropriate for a skilled therapy evaluation at this time. Please consult physical therapy if any therapy needs arise. Thank you.  
 
Ivonne Mercedes, PT

## 2018-10-02 NOTE — ED NOTES
Spoke with Dr. Mora Late about the inability to obtain 2 lines despite multiple US guided, is okay with 1 line at this time

## 2018-10-02 NOTE — H&P
Hospitalist Admission Note NAME: Hilaria Metz :  1934 MRN:  [id number] Date/Time:  10/2/2018 12:47 PM 
 
Patient PCP: Beatrice Garcia MD  
Cardiology:  Dr. Patel Counts GI:  Dr. Bean Aguilera 
______________________________________________________________________ Assessment & Plan: 
Lower GI bleed, likely due to diverticulosis, POA 
--allow clear liquid 
--no abdominal pain. Check lactic 
--hold aspirin. H&H q8h. Transfuse if hgb 7 or less 
--GI consult. --if bleeding worsens, get bleeding scan. Has anaphylactic reaction to IV contrast. 
 
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 
--hold aspirin due to bleeding 
--continue coreg, lisinopril, lasix Remote hx PUD 
--no melena 
--continue PPI Hx frequent uti 
--continue cipro Chronic respiratory failure on 2.5L 
--continue O2 and pulmicort. Body mass index is 37.31 kg/(m^2). Code:  full DVT prophylaxis: SCD Surrogate decision maker:  None; has 7 children Subjective: CHIEF COMPLAINT:  Rectal bleeding HISTORY OF PRESENT ILLNESS:    
Hilaria Metz is a 80 y.o.  female with PMH CAD, chronic systolic chf ef 82-13%, chronic O2 2.5L continuous, diverticulosis presents with acute onset of red blood mixed with stool x 2 at home. In ER, on rectal exam, had red blood coming out of rectum. Since arrival to ER has had 4-5 episodes of moderate blood with dark red clots per family, confirmed to ER nurse who reports patient having episode of passing blood every 1.5 hours. Denies any abdominal pain, nausea, vomiting. Feels like she is feeling diarrhea. Report hx of remote stomach ulcer >30 years ago. No recent melena. Patient on aspirin daily, no other nsaids. We were asked to admit for work up and evaluation of the above problems. Past Medical History:  
Diagnosis Date  Asthma  Autoimmune disease ([facility] [address] lupus  CAD (coronary artery disease) cardiac cath  CAD (coronary artery disease) sick sinus syndrome  Essential hypertension  GERD (gastroesophageal reflux disease)  Glaucoma  Heart failure ([facility] [address] Lupus  Hypertension  Other ill-defined conditions(799.89)  Pacemaker  PUD (peptic ulcer disease)  S/P implantation of automatic cardioverter/defibrillator (AICD) 1/14/2015 [address] upgrade to AICD implant  SSS (sick sinus syndrome) ([facility] [address] Past Surgical History:  
Procedure Laterality Date  CARDIAC SURG PROCEDURE UNLIST    
 pacemaker/defibrilator.  HX GYN    
 BTL  
 HX PACEMAKER    
 [place] COLONOSCOPY W/BIOPSY SINGLE/MULTIPLE  3/26/2012  [place] COLSC FLX W/RMVL OF TUMOR POLYP LESION SNARE TQ  3/26/2012 Social History Substance Use Topics  Smoking status: Never Smoker  Smokeless tobacco: Never Used  Alcohol use No  
 Lives with son Perry Kim. SOB walking chronically, uses wheelchair when leave house. Family History Problem Relation Age of Onset  Arrhythmia Mother  Hypertension Mother  Hypertension Father Allergies Allergen Reactions  Contrast Agent [Iodine] Anaphylaxis Made aware of allergy 1/4/13  Sulfa (Sulfonamide Antibiotics) Hives  Codeine Anxiety  Pcn [Penicillins] Anaphylaxis Reaction was to penicillin injections into buttocks. She can take oral amoxicillin Prior to Admission medications Medication Sig Start Date End Date Taking? Authorizing Provider  
potassium chloride SR (KLOR-CON 10) 10 mEq tablet Take 10 mEq by mouth daily. Patient takes 10 mEq daily and 20 mEq QHS   Yes Phys Other, MD  
potassium chloride SR (KLOR-CON 10) 10 mEq tablet Take 20 mEq by mouth nightly.  Patient takes 10 mEq daily and 20 mEq QHS   Yes Phys Other, MD  
albuterol (PROVENTIL HFA, VENTOLIN HFA, PROAIR HFA) 90 mcg/actuation inhaler Take 1-2 Puffs by inhalation every four (4) hours as needed for Wheezing or Shortness of Breath. Yes Cash Pop MD  
Psyllium Husk-Sucrose (FIBER, PSYLLIUM HUSK/SUGAR,) 3.4 gram/11 gram powd Take 1 Cap by mouth daily. Yes Cash Pop MD  
mv-mn/folic acid/calcium/vit K (ONE-A-DAY WOMEN'S 50 PLUS PO) Take 1 Tab by mouth daily. Yes Cash Pop MD  
furosemide (LASIX) 80 mg tablet TAKE 1 TABLET BY MOUTH TWICE DAILY 4/27/18  Yes Jorge Briceño MD  
nitroglycerin (NITROSTAT) 0.4 mg SL tablet 1 Tab by SubLINGual route every five (5) minutes as needed for Chest Pain (Call 911 if not relieved by 3 tablets). 3/21/18  Yes Jorge Briceño MD  
carvedilol (COREG) 25 mg tablet TAKE 1 TABLET BY MOUTH TWICE DAILY WITH MEALS 8/25/17  Yes Santa Reese NP  
atorvastatin (LIPITOR) 40 mg tablet TAKE 1 TABLET BY MOUTH DAILY 7/28/17  Yes Santa Reese NP  
lisinopril (PRINIVIL, ZESTRIL) 10 mg tablet TAKE 1 TABLET BY MOUTH DAILY 4/11/15  Yes Jorge Briceño MD  
NEXIUM 40 mg capsule TAKE ONE CAPSULE BY MOUTH DAILY 2/28/15  Yes Jorge Briceño MD  
aspirin delayed-release 81 mg tablet Take 81 mg by mouth daily. Yes Historical Provider  
ciprofloxacin (CIPRO) 250 mg tablet Take 125 mg by mouth daily. for UTI prophylaxis   Yes Historical Provider  
acetaminophen (TYLENOL ARTHRITIS PAIN) 650 mg CR tablet Take 650 mg by mouth every six (6) hours as needed for Pain. Yes Historical Provider  
latanoprost (XALATAN) 0.005 % ophthalmic solution Administer 1 Drop to both eyes nightly. Yes Cash Pop MD  
cetirizine (ZYRTEC) 10 mg tablet Take 10 mg by mouth daily. Yes Cash Pop MD  
budesonide (PULMICORT) 180 mcg/actuation aepb inhaler Take 2 puffs by inhalation two (2) times a day. Historical Provider REVIEW OF SYSTEMS:  POSITIVE= Bold. Negative = normal text General:  fever, chills, sweats, generalized weakness, weight loss/gain, loss of appetite Eyes:  blurred vision, eye pain, loss of vision, diplopia Ear Nose and Throat:  rhinorrhea, pharyngitis Respiratory:   cough, sputum production, SOB, wheezing, VILLAR, pleuritic pain 
Cardiology:  chest pain, palpitations, orthopnea, PND, edema, syncope Gastrointestinal:  abdominal pain, N/V, dysphagia, diarrhea, constipation, bleeding Genitourinary:  frequency, urgency, dysuria, hematuria, incontinence Muskuloskeletal :  arthralgia, myalgia Hematology:  easy bruising, bleeding, lymphadenopathy Dermatological:  rash, ulceration, pruritis Endocrine:  hot flashes or polydipsia Neurological:  headache, dizziness, confusion, focal weakness, paresthesia, memory loss, gait disturbance Psychological: anxiety, depression, agitation Objective: VITALS:   
Visit Vitals  /89  Pulse 86  Temp 98.1 °F (36.7 °C)  Resp 20  Wt 92.5 kg (204 lb)  SpO2 97%  BMI 37.31 kg/m2 Temp (24hrs), Av.2 °F (36.8 °C), Min:98.1 °F (36.7 °C), Max:98.2 °F (36.8 °C) Body mass index is 37.31 kg/(m^2). PHYSICAL EXAM: 
 
General:    Alert, obese, cooperative, no distress, appears stated age. HEENT: Atraumatic, anicteric sclerae, pink conjunctivae No oral ulcers, mucosa moist, throat clear. Hearing intact. Neck:  Supple, symmetrical,  thyroid: non tender. Large thyroid mass/goiter Lungs:   Clear to auscultation bilaterally except few crackles in left base. No Wheezing or Rhonchi. Chest wall:  No tenderness  No Accessory muscle use. Heart:   Regular  rhythm,  2/6 systolic  murmur   No gallop. No edema. Abdomen:   Soft, non-tender. Not distended. Bowel sounds normal. No masses Extremities: No cyanosis. No clubbing Skin:     Not pale Not Jaundiced  No rashes Psych:  Good insight. Not depressed. Not anxious or agitated. Neurologic: EOMs intact. No facial asymmetry. No aphasia or slurred speech. Symmetrical strength, Alert and oriented X self, year, place. Says month is September IMAGING RESULTS: 
 []       I have personally reviewed the actual   []     CXR  []     CT scan CXR: 
 CT : 
EKG: 
 ________________________________________________________________________ Care Plan discussed with: 
  Comments Patient y Family  y Children bedside RN Care Manager Consultant:     
________________________________________________________________________ Prophylaxis: 
GI PPI  
DVT SCD  
________________________________________________________________________ Recommended Disposition:  
Home with Family y HH/PT/OT/RN y  
SNF/LTC   
ENRRIQUE   
________________________________________________________________________ Code Status: 
Full Code y  
DNR/DNI   
________________________________________________________________________ TOTAL TIME:  50 minutes 
 
______________________________________________________________________ Wyvonne Chough, MD 
 
 
Procedures: see electronic medical records for all procedures/Xrays and details which were not copied into this note but were reviewed prior to creation of Plan. LAB DATA REVIEWED:   
Recent Results (from the past 24 hour(s)) CBC WITH AUTOMATED DIFF Collection Time: 10/02/18  8:59 AM  
Result Value Ref Range WBC 6.3 3.6 - 11.0 K/uL  
 RBC 3.45 (L) 3.80 - 5.20 M/uL HGB 9.5 (L) 11.5 - 16.0 g/dL HCT 32.2 (L) 35.0 - 47.0 % MCV 93.3 80.0 - 99.0 FL  
 MCH 27.5 26.0 - 34.0 PG  
 MCHC 29.5 (L) 30.0 - 36.5 g/dL  
 RDW 12.9 11.5 - 14.5 % PLATELET 324 442 - 025 K/uL MPV 8.9 8.9 - 12.9 FL  
 NRBC 0.0 0  WBC ABSOLUTE NRBC 0.00 0.00 - 0.01 K/uL NEUTROPHILS 67 32 - 75 % LYMPHOCYTES 16 12 - 49 % MONOCYTES 13 5 - 13 % EOSINOPHILS 3 0 - 7 % BASOPHILS 1 0 - 1 % IMMATURE GRANULOCYTES 1 (H) 0.0 - 0.5 % ABS. NEUTROPHILS 4.3 1.8 - 8.0 K/UL  
 ABS. LYMPHOCYTES 1.0 0.8 - 3.5 K/UL  
 ABS. MONOCYTES 0.8 0.0 - 1.0 K/UL  
 ABS. EOSINOPHILS 0.2 0.0 - 0.4 K/UL  
 ABS. BASOPHILS 0.0 0.0 - 0.1 K/UL  
 ABS. IMM.  GRANS. 0.0 0.00 - 0.04 K/UL  
 DF AUTOMATED    
TYPE & SCREEN  
 Collection Time: 10/02/18  8:59 AM  
Result Value Ref Range Crossmatch Expiration 10/05/2018 ABO/Rh(D) O POSITIVE Antibody screen NEG PROTHROMBIN TIME + INR Collection Time: 10/02/18  8:59 AM  
Result Value Ref Range INR 1.1 0.9 - 1.1 Prothrombin time 11.1 9.0 - 11.1 sec METABOLIC PANEL, COMPREHENSIVE Collection Time: 10/02/18  8:59 AM  
Result Value Ref Range Sodium 139 136 - 145 mmol/L Potassium 4.3 3.5 - 5.1 mmol/L Chloride 98 97 - 108 mmol/L  
 CO2 39 (H) 21 - 32 mmol/L Anion gap 2 (L) 5 - 15 mmol/L Glucose 94 65 - 100 mg/dL BUN 13 6 - 20 MG/DL Creatinine 0.91 0.55 - 1.02 MG/DL  
 BUN/Creatinine ratio 14 12 - 20 GFR est AA >60 >60 ml/min/1.73m2 GFR est non-AA 59 (L) >60 ml/min/1.73m2 Calcium 8.4 (L) 8.5 - 10.1 MG/DL Bilirubin, total 0.7 0.2 - 1.0 MG/DL  
 ALT (SGPT) 17 12 - 78 U/L  
 AST (SGOT) 16 15 - 37 U/L Alk. phosphatase 80 45 - 117 U/L Protein, total 8.0 6.4 - 8.2 g/dL Albumin 3.6 3.5 - 5.0 g/dL Globulin 4.4 (H) 2.0 - 4.0 g/dL A-G Ratio 0.8 (L) 1.1 - 2.2

## 2018-10-02 NOTE — PROGRESS NOTES
Called answering service to call Dr. Luis Daniel Sena. 200 - Dr. Luis Daniel Sena reports pt does not need IVF because BP is not low.

## 2018-10-03 LAB
HCT VFR BLD AUTO: 24 % (ref 35–47)
HCT VFR BLD AUTO: 24.4 % (ref 35–47)
HGB BLD-MCNC: 7.1 G/DL (ref 11.5–16)
HGB BLD-MCNC: 7.4 G/DL (ref 11.5–16)
LACTATE SERPL-SCNC: 1.8 MMOL/L (ref 0.4–2)

## 2018-10-03 PROCEDURE — 85018 HEMOGLOBIN: CPT | Performed by: INTERNAL MEDICINE

## 2018-10-03 PROCEDURE — 74011250637 HC RX REV CODE- 250/637: Performed by: HOSPITALIST

## 2018-10-03 PROCEDURE — 36430 TRANSFUSION BLD/BLD COMPNT: CPT

## 2018-10-03 PROCEDURE — 30233N1 TRANSFUSION OF NONAUTOLOGOUS RED BLOOD CELLS INTO PERIPHERAL VEIN, PERCUTANEOUS APPROACH: ICD-10-PCS | Performed by: HOSPITALIST

## 2018-10-03 PROCEDURE — 94760 N-INVAS EAR/PLS OXIMETRY 1: CPT

## 2018-10-03 PROCEDURE — P9016 RBC LEUKOCYTES REDUCED: HCPCS | Performed by: EMERGENCY MEDICINE

## 2018-10-03 PROCEDURE — 36415 COLL VENOUS BLD VENIPUNCTURE: CPT | Performed by: HOSPITALIST

## 2018-10-03 PROCEDURE — 77010033678 HC OXYGEN DAILY

## 2018-10-03 PROCEDURE — 65660000000 HC RM CCU STEPDOWN

## 2018-10-03 PROCEDURE — 74011250636 HC RX REV CODE- 250/636: Performed by: INTERNAL MEDICINE

## 2018-10-03 PROCEDURE — 83605 ASSAY OF LACTIC ACID: CPT | Performed by: HOSPITALIST

## 2018-10-03 RX ORDER — SODIUM CHLORIDE 9 MG/ML
250 INJECTION, SOLUTION INTRAVENOUS AS NEEDED
Status: DISCONTINUED | OUTPATIENT
Start: 2018-10-03 | End: 2018-10-05 | Stop reason: HOSPADM

## 2018-10-03 RX ORDER — FUROSEMIDE 10 MG/ML
20 INJECTION INTRAMUSCULAR; INTRAVENOUS ONCE
Status: COMPLETED | OUTPATIENT
Start: 2018-10-03 | End: 2018-10-03

## 2018-10-03 RX ADMIN — ATORVASTATIN CALCIUM 40 MG: 40 TABLET, FILM COATED ORAL at 09:07

## 2018-10-03 RX ADMIN — Medication 10 ML: at 16:13

## 2018-10-03 RX ADMIN — Medication 10 ML: at 06:56

## 2018-10-03 RX ADMIN — CIPROFLOXACIN HYDROCHLORIDE 125 MG: 250 TABLET, FILM COATED ORAL at 09:14

## 2018-10-03 RX ADMIN — SODIUM CHLORIDE 25 ML/HR: 900 INJECTION, SOLUTION INTRAVENOUS at 18:11

## 2018-10-03 RX ADMIN — POTASSIUM CHLORIDE 20 MEQ: 750 TABLET, FILM COATED, EXTENDED RELEASE ORAL at 21:51

## 2018-10-03 RX ADMIN — CARVEDILOL 25 MG: 12.5 TABLET, FILM COATED ORAL at 16:13

## 2018-10-03 RX ADMIN — CARVEDILOL 25 MG: 12.5 TABLET, FILM COATED ORAL at 09:07

## 2018-10-03 RX ADMIN — PANTOPRAZOLE SODIUM 40 MG: 40 TABLET, DELAYED RELEASE ORAL at 06:55

## 2018-10-03 RX ADMIN — FUROSEMIDE 20 MG: 10 INJECTION, SOLUTION INTRAMUSCULAR; INTRAVENOUS at 20:20

## 2018-10-03 RX ADMIN — LATANOPROST 1 DROP: 50 SOLUTION OPHTHALMIC at 22:31

## 2018-10-03 RX ADMIN — CETIRIZINE HYDROCHLORIDE 10 MG: 10 TABLET, FILM COATED ORAL at 09:07

## 2018-10-03 RX ADMIN — Medication 10 ML: at 21:52

## 2018-10-03 NOTE — PROGRESS NOTES
Hgb has dropped 2gm. BP trending down to 331 to 138 systolic. Discontinue lasix, lisinopril, amlodipine to avoid hypotension.  
 
Raul Lam MD

## 2018-10-03 NOTE — PROGRESS NOTES
Addis central storage reports she will page [facility] for a bedside commode to Primary RN extension [phone]

## 2018-10-03 NOTE — PROGRESS NOTES
Hospitalist Progress Note NAME: Gilles Baltazar :  1934 MRN:  [id number] Assessment / Plan: 
Lower GI bleed, likely due to diverticulosis, POA 
--allow clear liquid 
--no abdominal pain. Check lactic 
--hold aspirin. H&H q8h. Transfuse if hgb 7 or less 
--GI consult. --if bleeding worsens, get bleeding scan. Has anaphylactic reaction to IV contrast. 
 
10/3: 
Hgb downtrending. Will transfuse total of 2 units pRBC and continue to monitor q8hrs. GI follow up noted.  
  
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 
--hold aspirin due to bleeding 
--continue to holdcoreg, lisinopril, lasix 
  
Remote hx PUD 
--no melena 
--continue PPI 
  
Hx frequent uti 
--continue cipro 
  
Chronic respiratory failure on 2.5L 
--continue O2 and pulmicort. 30.0 - 39.9 Obese / Body mass index is 37.31 kg/(m^2). Code status: Full Prophylaxis: SCD's Recommended Disposition: TBD Subjective: Chief Complaint / Reason for Physician Visit Denies any complaints. Discussed with RN events overnight. Review of Systems: 
Symptom Y/N Comments  Symptom Y/N Comments Fever/Chills    Chest Pain n   
Poor Appetite n   Edema Cough    Abdominal Pain n   
Sputum    Joint Pain SOB/VILLAR n   Pruritis/Rash Nausea/vomit    Tolerating PT/OT y Diarrhea    Tolerating Diet Constipation    Other Could NOT obtain due to:   
 
Objective: VITALS:  
Last 24hrs VS reviewed since prior progress note. Most recent are: 
Patient Vitals for the past 24 hrs: 
 Temp Pulse Resp BP SpO2  
10/03/18 1231 98.7 °F (37.1 °C) 73 20 137/66 100 % 10/03/18 1134 98.1 °F (36.7 °C) 86 18 116/60 100 % 10/03/18 1003 98.6 °F (37 °C) 74 20 129/51 100 % 10/03/18 0730 98.5 °F (36.9 °C) 87 19 132/53 100 % 10/03/18 0340 98.2 °F (36.8 °C) 78 20 123/63 100 % 10/02/18 2330 98.5 °F (36.9 °C) 68 17 118/67 100 % 10/02/18 1922 98.2 °F (36.8 °C) 73 17 116/62 98 % 10/02/18 1746 - 74 17 100/77 99 % 10/02/18 1505 - 86 - 167/85 95 % 10/02/18 1431 98 °F (36.7 °C) 88 16 (!) 165/93 99 % Intake/Output Summary (Last 24 hours) at 10/03/18 1256 Last data filed at 10/03/18 0227 Gross per 24 hour Intake                0 ml Output              400 ml Net             -400 ml PHYSICAL EXAM: 
General: Elderly, Alert, cooperative, no acute distress   
EENT:  EOMI. Anicteric sclerae. MMM Resp:  CTA bilaterally, no wheezing or rales. No accessory muscle use CV:  Regular  rhythm,  No edema GI:  Soft, Non distended, Non tender.  +Bowel sounds Neurologic:  Alert and oriented X 3, normal speech, Psych:   Fair insight. Not anxious nor agitated Skin:  No rashes. No jaundice Reviewed most current lab test results and cultures  YES Reviewed most current radiology test results   YES Review and summation of old records today    NO Reviewed patient's current orders and MAR    YES 
PMH/ reviewed - no change compared to H&P 
________________________________________________________________________ Care Plan discussed with: 
  Comments Patient x Family  x   
RN x Care Manager Consultant  x Multidiciplinary team rounds were held today with , nursing, pharmacist and clinical coordinator. Patient's plan of care was discussed; medications were reviewed and discharge planning was addressed. ________________________________________________________________________ Total NON critical care TIME:  25   Minutes Total CRITICAL CARE TIME Spent:   Minutes non procedure based Comments >50% of visit spent in counseling and coordination of care    
________________________________________________________________________ Terri Tubbs MD  
 
Procedures: see electronic medical records for all procedures/Xrays and details which were not copied into this note but were reviewed prior to creation of Plan.    
 
LABS: 
 I reviewed today's most current labs and imaging studies. Pertinent labs include: 
Recent Labs 10/03/18 
 4697  10/02/18 
 1852  10/02/18 
 0528 WBC   --    --   6.3 HGB  7.1*  7.5*  9.5* HCT  24.0*  24.5*  32.2*  
PLT   --    --   281 Recent Labs 10/02/18 
 4707 NA  139  
K  4.3 CL  98  
CO2  39* GLU  94 BUN  13  
CREA  0.91  
CA  8.4* ALB  3.6 TBILI  0.7 SGOT  16 ALT  17 INR  1.1 Signed: Laurita Holly MD

## 2018-10-03 NOTE — PROGRESS NOTES
GI Attg PN Tylor Elkins) Pt seen with family and RN. No further bleeding noted. Has received only 1 unit PRBC but recheck hgb 7.4. Pending 2nd unit PRBC to be transfused this PM.  She notes SOB with minimal movement. Denies CP. Winded on rising to bedside commode. PE with mild dullness at Right base. Plan- 
1) Proceed with transfusion as planned 2) IV Lasix 20mg x1 now Boris Patel MD

## 2018-10-03 NOTE — PROGRESS NOTES
Called on call Hospitalist James Farrar) line Dr. Sabrina Rich. Awaiting return call 80 - Notified Dr. Sabrina Rich of blood given without filter tubing.  
 
1652 - Notified Dr. Sabrina Rich of risks of giving blood without filter tubing. New orders placed. CBC Q8hrs

## 2018-10-03 NOTE — PROGRESS NOTES
Problem: Falls - Risk of 
Goal: *Absence of Falls Document Jovan Davis Fall Risk and appropriate interventions in the flowsheet. Outcome: Progressing Towards Goal 
Fall Risk Interventions: 
Mobility Interventions: Patient to call before getting OOB Medication Interventions: Patient to call before getting OOB, Teach patient to arise slowly Elimination Interventions: Call light in reach, Patient to call for help with toileting needs History of Falls Interventions: Evaluate medications/consider consulting pharmacy Comments: Bloody clots stool x 3 10/2 Voids Check Hemoglobin every8, next at midnight

## 2018-10-03 NOTE — PROGRESS NOTES
GI Progress Note Nevaeh Hernandez) NAME:Amalia Cole LXM:[id number] TZW:[id number] ATTG: Bere Valdez MD  PCP: Oliver Heimlich, MD 
Date/Time:  10/3/2018 8:39 AM  
Assessment:  
· LGIB- suspect diverticular source as seen on prior colonoscopy and given LV painless nature; last episode at 830p · Acute Blood loss anemia- drop in 2pt Hgb noted Plan:  
· Continue clears only · Would transfuse 2 units PRBC given age and CAD (she has an AICD) · If rebleeds, get bleeding scan Subjective:  
Discussed with RN events overnight. Last bloody BM was at 830pm 
 
Complaint Y/N Description Abdominal Pain n Hematemesis n Hematochezia n Melena n   
Constipation n   
Diarrhea n Dyspepsia n Dysphagia n   
Jaundiced n   
Nausea/vomiting n Review of Systems: 
Symptom Y/N Comments  Symptom Y/N Comments Fever/Chills n   Chest Pain n   
Cough n   Headaches n Sputum n   Joint Pain n   
SOB/VILLAR n   Pruritis/Rash n Tolerating Diet y clears  Other Could NOT obtain due to:   
 
Objective: VITALS:  
Last 24hrs VS reviewed since prior progress note. Most recent are: 
Visit Vitals  /53  Pulse 87  Temp 98.5 °F (36.9 °C)  Resp 19  Wt 92.5 kg (204 lb)  SpO2 100%  BMI 37.31 kg/m2 Intake/Output Summary (Last 24 hours) at 10/03/18 1856 Last data filed at 10/03/18 0227 Gross per 24 hour Intake                0 ml Output              400 ml Net             -400 ml PHYSICAL EXAM: 
General: WD, WN. Alert, cooperative, no acute distress   
HEENT: NC, Atraumatic. PERRL. Anicteric sclerae. Lungs:  CTA Bilaterally. No Wheezing/Rhonchi/Rales. Heart:  Regular  rhythm,  No murmur/Rub/Gallops. +AICD Abdomen: Soft, Non distended, Non tender.  +S Extremities: No c/c/e Neurologic:  CN 2-12 gi, A/O X 3. No acute neurological distress Psych:   Good insight. Not anxious nor agitated. Lab and Radiology Data Reviewed: (see below) Medications Reviewed: (see below) PMH/SH reviewed - no change compared to H&P 
________________________________________________________________________ Total time spent with patient: 15 minutes  
________________________________________________________________________ Care Plan discussed with: 
Patient y Family  y  
RN y  
           Consultant:  clement Schmid MD  
 
Procedures: see electronic medical records for all procedures/Xrays and details which were not copied into this note but were reviewed prior to creation of Plan. LABS: 
Recent Labs 10/03/18 
 0222  10/02/18 
 1852  10/02/18 
 1306 WBC   --    --   6.3 HGB  7.1*  7.5*  9.5* HCT  24.0*  24.5*  32.2*  
PLT   --    --   281 Recent Labs 10/02/18 
 5649 NA  139  
K  4.3 CL  98  
CO2  39* BUN  13  
CREA  0.91  
GLU  94  
CA  8.4* Recent Labs 10/02/18 
 4921 SGOT  16  
AP  80  
TP  8.0 ALB  3.6 GLOB  4.4* Recent Labs 10/02/18 
 3354 INR  1.1 PTP  11.1 No results for input(s): FE, TIBC, PSAT, FERR in the last 72 hours. No results found for: FOL, RBCF No results for input(s): PH, PCO2, PO2 in the last 72 hours. No results for input(s): CPK, CKMB in the last 72 hours. No lab exists for component: TROPONINI Lab Results Component Value Date/Time Color YELLOW/STRAW 08/22/2017 02:47 AM  
 Appearance CLOUDY (A) 08/22/2017 02:47 AM  
 Specific gravity 1.011 08/22/2017 02:47 AM  
 pH (UA) 6.0 08/22/2017 02:47 AM  
 Protein NEGATIVE  08/22/2017 02:47 AM  
 Glucose NEGATIVE  08/22/2017 02:47 AM  
 Ketone NEGATIVE  08/22/2017 02:47 AM  
 Bilirubin NEGATIVE  08/22/2017 02:47 AM  
 Urobilinogen 1.0 08/22/2017 02:47 AM  
 Nitrites NEGATIVE  08/22/2017 02:47 AM  
 Leukocyte Esterase LARGE (A) 08/22/2017 02:47 AM  
 Epithelial cells FEW 08/22/2017 02:47 AM  
 Bacteria 4+ (A) 08/22/2017 02:47 AM  
 WBC  08/22/2017 02:47 AM  
 RBC 5-10 08/22/2017 02:47 AM  
 
 
MEDICATIONS: 
Current Facility-Administered Medications Medication Dose Route Frequency  sodium chloride (NS) flush 5-10 mL  5-10 mL IntraVENous Q8H  
 sodium chloride (NS) flush 5-10 mL  5-10 mL IntraVENous PRN  
 acetaminophen (TYLENOL) tablet 650 mg  650 mg Oral Q6H PRN  
 ondansetron (ZOFRAN) injection 4 mg  4 mg IntraVENous Q6H PRN  potassium chloride SR (KLOR-CON 10) tablet 20 mEq  20 mEq Oral QHS  nitroglycerin (NITROSTAT) tablet 0.4 mg  0.4 mg SubLINGual Q5MIN PRN  
 carvedilol (COREG) tablet 25 mg  25 mg Oral BID WITH MEALS  
 atorvastatin (LIPITOR) tablet 40 mg  40 mg Oral DAILY  pantoprazole (PROTONIX) tablet 40 mg  40 mg Oral ACB  ciprofloxacin HCl (CIPRO) tablet 125 mg  125 mg Oral DAILY  cetirizine (ZYRTEC) tablet 10 mg  10 mg Oral DAILY  latanoprost (XALATAN) 0.005 % ophthalmic solution 1 Drop  1 Drop Both Eyes QHS  fluticasone furoate (ARNUITY ELLIPTA) 100 mcg/puff  1 Puff Inhalation DAILY  0.9% sodium chloride infusion  50 mL/hr IntraVENous CONTINUOUS Facility-Administered Medications Ordered in Other Encounters Medication Dose Route Frequency  ADDaptor  vancomycin (VANCOCIN) 1,000 mg injection  0.9% sodium chloride (MBP/ADV) 0.9 % infusion  bacitracin 50,000 unit injection

## 2018-10-04 ENCOUNTER — APPOINTMENT (OUTPATIENT)
Dept: GENERAL RADIOLOGY | Age: 83
DRG: 378 | End: 2018-10-04
Attending: EMERGENCY MEDICINE
Payer: MEDICARE

## 2018-10-04 LAB
ABO + RH BLD: NORMAL
BLD PROD TYP BPU: NORMAL
BLD PROD TYP BPU: NORMAL
BLOOD GROUP ANTIBODIES SERPL: NORMAL
BPU ID: NORMAL
BPU ID: NORMAL
CROSSMATCH RESULT,%XM: NORMAL
CROSSMATCH RESULT,%XM: NORMAL
HCT VFR BLD AUTO: 25.8 % (ref 35–47)
HCT VFR BLD AUTO: 26.6 % (ref 35–47)
HCT VFR BLD AUTO: 27.7 % (ref 35–47)
HGB BLD-MCNC: 7.8 G/DL (ref 11.5–16)
HGB BLD-MCNC: 8.1 G/DL (ref 11.5–16)
HGB BLD-MCNC: 8.6 G/DL (ref 11.5–16)
SPECIMEN EXP DATE BLD: NORMAL
STATUS OF UNIT,%ST: NORMAL
STATUS OF UNIT,%ST: NORMAL
UNIT DIVISION, %UDIV: 0
UNIT DIVISION, %UDIV: 0

## 2018-10-04 PROCEDURE — 77010033678 HC OXYGEN DAILY

## 2018-10-04 PROCEDURE — 71045 X-RAY EXAM CHEST 1 VIEW: CPT

## 2018-10-04 PROCEDURE — 65660000000 HC RM CCU STEPDOWN

## 2018-10-04 PROCEDURE — 74011250636 HC RX REV CODE- 250/636: Performed by: GENERAL ACUTE CARE HOSPITAL

## 2018-10-04 PROCEDURE — 74011250636 HC RX REV CODE- 250/636: Performed by: EMERGENCY MEDICINE

## 2018-10-04 PROCEDURE — 85018 HEMOGLOBIN: CPT | Performed by: HOSPITALIST

## 2018-10-04 PROCEDURE — 94760 N-INVAS EAR/PLS OXIMETRY 1: CPT

## 2018-10-04 PROCEDURE — 74011250637 HC RX REV CODE- 250/637: Performed by: GENERAL ACUTE CARE HOSPITAL

## 2018-10-04 PROCEDURE — 74011250637 HC RX REV CODE- 250/637: Performed by: HOSPITALIST

## 2018-10-04 PROCEDURE — 36415 COLL VENOUS BLD VENIPUNCTURE: CPT | Performed by: HOSPITALIST

## 2018-10-04 RX ORDER — FUROSEMIDE 10 MG/ML
20 INJECTION INTRAMUSCULAR; INTRAVENOUS ONCE
Status: COMPLETED | OUTPATIENT
Start: 2018-10-04 | End: 2018-10-04

## 2018-10-04 RX ORDER — FUROSEMIDE 40 MG/1
80 TABLET ORAL 2 TIMES DAILY
Status: DISCONTINUED | OUTPATIENT
Start: 2018-10-05 | End: 2018-10-05 | Stop reason: HOSPADM

## 2018-10-04 RX ORDER — LISINOPRIL 5 MG/1
10 TABLET ORAL DAILY
Status: DISCONTINUED | OUTPATIENT
Start: 2018-10-04 | End: 2018-10-05 | Stop reason: HOSPADM

## 2018-10-04 RX ORDER — FUROSEMIDE 10 MG/ML
20 INJECTION INTRAMUSCULAR; INTRAVENOUS
Status: COMPLETED | OUTPATIENT
Start: 2018-10-04 | End: 2018-10-04

## 2018-10-04 RX ADMIN — PANTOPRAZOLE SODIUM 40 MG: 40 TABLET, DELAYED RELEASE ORAL at 07:01

## 2018-10-04 RX ADMIN — CARVEDILOL 25 MG: 12.5 TABLET, FILM COATED ORAL at 08:37

## 2018-10-04 RX ADMIN — Medication 10 ML: at 07:01

## 2018-10-04 RX ADMIN — LISINOPRIL 10 MG: 5 TABLET ORAL at 11:39

## 2018-10-04 RX ADMIN — ATORVASTATIN CALCIUM 40 MG: 40 TABLET, FILM COATED ORAL at 08:37

## 2018-10-04 RX ADMIN — Medication 10 ML: at 08:38

## 2018-10-04 RX ADMIN — CIPROFLOXACIN HYDROCHLORIDE 125 MG: 250 TABLET, FILM COATED ORAL at 09:46

## 2018-10-04 RX ADMIN — Medication 10 ML: at 21:18

## 2018-10-04 RX ADMIN — FUROSEMIDE 20 MG: 10 INJECTION, SOLUTION INTRAMUSCULAR; INTRAVENOUS at 00:54

## 2018-10-04 RX ADMIN — CETIRIZINE HYDROCHLORIDE 10 MG: 10 TABLET, FILM COATED ORAL at 08:37

## 2018-10-04 RX ADMIN — FLUTICASONE FUROATE 1 PUFF: 100 POWDER RESPIRATORY (INHALATION) at 09:46

## 2018-10-04 RX ADMIN — FUROSEMIDE 20 MG: 10 INJECTION, SOLUTION INTRAMUSCULAR; INTRAVENOUS at 08:39

## 2018-10-04 RX ADMIN — POTASSIUM CHLORIDE 20 MEQ: 750 TABLET, FILM COATED, EXTENDED RELEASE ORAL at 21:17

## 2018-10-04 RX ADMIN — CARVEDILOL 25 MG: 12.5 TABLET, FILM COATED ORAL at 17:42

## 2018-10-04 NOTE — PROGRESS NOTES
Hospitalist Progress Note NAME: Bharath Cottrell :  1934 MRN:  [id number] Assessment / Plan: 
Lower GI bleed, likely due to diverticulosis, POA 
--allow clear liquid 
--no abdominal pain.  Check lactic 
--hold aspirin.  H&H q8h.  Transfuse if hgb 7 or less 
--GI consult. --if bleeding worsens, get bleeding scan.  Has anaphylactic reaction to IV contrast. 
 
10/3: 
Hgb downtrending. Will transfuse total of 2 units pRBC and continue to monitor q8hrs. GI follow up noted. 10/4: 
H/H improved appropriately after transfusion. Will continue to monitor H/H. Pt had 3 bloody BM overnight as per RN. GI follow up noted. ?SOB 
-Pt on 2L home O2 - now back on that and without any respiratory complaints. CXR clear. Given Lasix overnight.  
   
Chronic Respiratory Failure, on 2L NC home O2 
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 
--hold aspirin due to bleeding 
--continue to holdcoreg, lisinopril, lasix 10/4: Will resume Lisinopril and Lasix.  
   
Remote hx PUD 
--no melena 
--continue PPI 
   
Hx frequent uti 
--continue cipro 
   
Chronic respiratory failure on 2.5L 
--continue O2 and pulmicort.  
  
30.0 - 39.9 Obese / Body mass index is 37.31 kg/(m^2). 
  
Code status: Full Prophylaxis: SCD's Recommended Disposition: TBD Subjective: Chief Complaint / Reason for Physician Visit Concerned about the transfusions and her breathing. Explained plan to pt and daughter. Discussed with RN events overnight. Review of Systems: 
Symptom Y/N Comments  Symptom Y/N Comments Fever/Chills n   Chest Pain n   
Poor Appetite n   Edema Cough n   Abdominal Pain n   
Sputum    Joint Pain SOB/VILLAR n   Pruritis/Rash Nausea/vomit    Tolerating PT/OT y Diarrhea    Tolerating Diet y Constipation    Other Could NOT obtain due to:   
 
Objective: VITALS:  
Last 24hrs VS reviewed since prior progress note. Most recent are: Patient Vitals for the past 24 hrs: 
 Temp Pulse Resp BP SpO2  
10/04/18 0941 - 78 - 134/83 -  
10/04/18 0839 - 88 - 151/84 -  
10/04/18 0837 - 88 - 151/84 -  
10/04/18 0825 98.1 °F (36.7 °C) 88 19 151/84 97 % 10/04/18 0334 98.1 °F (36.7 °C) 69 19 135/77 100 % 10/04/18 0005 98.1 °F (36.7 °C) 74 20 130/70 99 % 10/03/18 2330 97.7 °F (36.5 °C) 71 20 130/68 99 % 10/03/18 2230 98.4 °F (36.9 °C) 67 20 131/63 100 % 10/03/18 2129 98.4 °F (36.9 °C) 70 20 111/58 100 % 10/03/18 2108 98.3 °F (36.8 °C) 68 20 119/58 99 % 10/03/18 2020 98.2 °F (36.8 °C) 88 20 125/62 100 % 10/03/18 1730 98.6 °F (37 °C) 75 18 127/57 99 % 10/03/18 1620 98.2 °F (36.8 °C) 77 18 125/42 100 % 10/03/18 1338 98.3 °F (36.8 °C) 74 20 120/85 100 % 10/03/18 1231 98.7 °F (37.1 °C) 73 20 137/66 100 % 10/03/18 1134 98.1 °F (36.7 °C) 86 18 116/60 100 % Intake/Output Summary (Last 24 hours) at 10/04/18 1038 Last data filed at 10/04/18 0200 Gross per 24 hour Intake          1932.13 ml Output             1000 ml Net           932.13 ml PHYSICAL EXAM: 
General: Alert, cooperative, on 2L NC  
EENT:  EOMI. Anicteric sclerae. MMM Resp:  Minimal crackles bibasilar, worse on R 
CV:  Regular  rhythm,  No edema GI:  Soft, Non distended, Non tender.  +Bowel sounds Neurologic:  Alert and oriented X 3, normal speech, Psych:   Good insight. Not anxious nor agitated Skin:  No rashes. No jaundice Reviewed most current lab test results and cultures  YES Reviewed most current radiology test results   YES Review and summation of old records today    NO Reviewed patient's current orders and MAR    YES 
PMH/SH reviewed - no change compared to H&P 
________________________________________________________________________ Care Plan discussed with: 
  Comments Patient x Family  x   
RN x Care Manager Consultant                   Multidiciplinary team rounds were held today with case manager, nursing, pharmacist and clinical coordinator. Patient's plan of care was discussed; medications were reviewed and discharge planning was addressed. ________________________________________________________________________ Total NON critical care TIME:  25   Minutes Total CRITICAL CARE TIME Spent:   Minutes non procedure based Comments >50% of visit spent in counseling and coordination of care    
________________________________________________________________________ Althea Ramos MD  
 
Procedures: see electronic medical records for all procedures/Xrays and details which were not copied into this note but were reviewed prior to creation of Plan. LABS: 
I reviewed today's most current labs and imaging studies. Pertinent labs include: 
Recent Labs 10/04/18 
 0303  10/03/18 
 1506  10/03/18 
 2462   10/02/18 
 6750 WBC   --    --    --    --   6.3 HGB  8.6*  7.4*  7.1*   < >  9.5* HCT  27.7*  24.4*  24.0*   < >  32.2*  
PLT   --    --    --    --   281  
 < > = values in this interval not displayed. Recent Labs 10/02/18 
 6509 NA  139  
K  4.3 CL  98  
CO2  39* GLU  94 BUN  13  
CREA  0.91  
CA  8.4* ALB  3.6 TBILI  0.7 SGOT  16 ALT  17 INR  1.1 Signed: Althea Ramos MD

## 2018-10-04 NOTE — PROGRESS NOTES
Pt s/p blood transfusion pt c/o sob especially on exertion crackles noted at based pt O2 sat 100% on 2.5L O2 NC no acute distress noted at this time contacted on-call hospitalist new orders noted will continue to monitor

## 2018-10-04 NOTE — PROGRESS NOTES
Dr. Iris Ruiz, family asking about vitamin B shot for patient. Nothing ordered? Family is concerned about anemia, feeling weak.

## 2018-10-04 NOTE — PROGRESS NOTES
GI Progress Note Brandon Kindra) NAME:Amalia De La Cruz JESSICA:[birth year] NATY:[id number] ATTG: Ezra Buenrostro MD  PCP: Jojo Cruz MD 
Date/Time:  10/4/2018 9454 Assessment:  
· LGIB- suspect diverticular source as seen on prior colonoscopy and given LV painless nature · Acute Blood loss anemia- improved appropriately with transfusion Plan:  
· Continue clears only · Serial H/H with transfusion indicated if Hgb>7 given age and CAD (she has an AICD) · If rebleeds, get bleeding scan; IV contrast allergy noted with anaphylaxis reported Subjective:  
Discussed with RN events overnight. 3 bloody BMs overnight and minimal bleeding in stool this AM.  Denies associated abd pain Complaint Y/N Description Abdominal Pain n Hematemesis n Hematochezia y Melena n   
Constipation n   
Diarrhea n Dyspepsia n Dysphagia n   
Jaundiced n   
Nausea/vomiting n Review of Systems: 
Symptom Y/N Comments  Symptom Y/N Comments Fever/Chills n   Chest Pain n   
Cough n   Headaches n Sputum n   Joint Pain n   
SOB/VILLAR n   Pruritis/Rash n Tolerating Diet y clears  Other Could NOT obtain due to:   
 
Objective: VITALS:  
Last 24hrs VS reviewed since prior progress note. Most recent are: 
Visit Vitals  /65  Pulse 76  Temp 97.7 °F (36.5 °C)  Resp 19  Wt 92.5 kg (204 lb)  SpO2 100%  BMI 37.31 kg/m2 Intake/Output Summary (Last 24 hours) at 10/04/18 1215 Last data filed at 10/04/18 0200 Gross per 24 hour Intake          1932.13 ml Output             1000 ml Net           932.13 ml PHYSICAL EXAM: 
General: WD, WN. Alert, cooperative, no acute distress   
HEENT: NC, Atraumatic. PERRL. Anicteric sclerae. Lungs:  CTA Bilaterally. No Wheezing/Rhonchi/Rales. Heart:  Regular  rhythm,  No murmur/Rub/Gallops. +AICD Abdomen: Soft, Non distended, Non tender.  +S Extremities: No c/c/e Neurologic:  CN 2-12 gi, A/O X 3. No acute neurological distress Psych:   Good insight. Not anxious nor agitated. Lab and Radiology Data Reviewed: (see below) Medications Reviewed: (see below) PMH/SH reviewed - no change compared to H&P 
________________________________________________________________________ Total time spent with patient: 15 minutes  
________________________________________________________________________ Care Plan discussed with: 
Patient y Family  y  
RN y  
           Consultant:    
 
Pili Gaines MD  
 
Procedures: see electronic medical records for all procedures/Xrays and details which were not copied into this note but were reviewed prior to creation of Plan. LABS: 
Recent Labs 10/04/18 
 1153  10/04/18 
 0303   10/02/18 
 7361 WBC   --    --    --   6.3 HGB  8.1*  8.6*   < >  9.5* HCT  26.6*  27.7*   < >  32.2*  
PLT   --    --    --   281  
 < > = values in this interval not displayed. Recent Labs 10/02/18 
 1942 NA  139  
K  4.3 CL  98  
CO2  39* BUN  13  
CREA  0.91  
GLU  94  
CA  8.4* Recent Labs 10/02/18 
 0240 SGOT  16  
AP  80  
TP  8.0 ALB  3.6 GLOB  4.4* Recent Labs 10/02/18 
 4892 INR  1.1 PTP  11.1 No results for input(s): FE, TIBC, PSAT, FERR in the last 72 hours. No results found for: FOL, RBCF No results for input(s): PH, PCO2, PO2 in the last 72 hours. No results for input(s): CPK, CKMB in the last 72 hours. No lab exists for component: TROPONINI Lab Results Component Value Date/Time  Color YELLOW/STRAW 08/22/2017 02:47 AM  
 Appearance CLOUDY (A) 08/22/2017 02:47 AM  
 Specific gravity 1.011 08/22/2017 02:47 AM  
 pH (UA) 6.0 08/22/2017 02:47 AM  
 Protein NEGATIVE  08/22/2017 02:47 AM  
 Glucose NEGATIVE  08/22/2017 02:47 AM  
 Ketone NEGATIVE  08/22/2017 02:47 AM  
 Bilirubin NEGATIVE  08/22/2017 02:47 AM  
 Urobilinogen 1.0 08/22/2017 02:47 AM  
 Nitrites NEGATIVE  08/22/2017 02:47 AM  
 Leukocyte Esterase LARGE (A) 08/22/2017 02:47 AM  
 Epithelial cells FEW 08/22/2017 02:47 AM  
 Bacteria 4+ (A) 08/22/2017 02:47 AM  
 WBC  08/22/2017 02:47 AM  
 RBC 5-10 08/22/2017 02:47 AM  
 
 
MEDICATIONS: 
Current Facility-Administered Medications Medication Dose Route Frequency  lisinopril (PRINIVIL, ZESTRIL) tablet 10 mg  10 mg Oral DAILY  [START ON 10/5/2018] furosemide (LASIX) tablet 80 mg  80 mg Oral BID  
 0.9% sodium chloride infusion 250 mL  250 mL IntraVENous PRN  
 sodium chloride (NS) flush 5-10 mL  5-10 mL IntraVENous Q8H  
 sodium chloride (NS) flush 5-10 mL  5-10 mL IntraVENous PRN  
 acetaminophen (TYLENOL) tablet 650 mg  650 mg Oral Q6H PRN  
 ondansetron (ZOFRAN) injection 4 mg  4 mg IntraVENous Q6H PRN  potassium chloride SR (KLOR-CON 10) tablet 20 mEq  20 mEq Oral QHS  nitroglycerin (NITROSTAT) tablet 0.4 mg  0.4 mg SubLINGual Q5MIN PRN  
 carvedilol (COREG) tablet 25 mg  25 mg Oral BID WITH MEALS  
 atorvastatin (LIPITOR) tablet 40 mg  40 mg Oral DAILY  pantoprazole (PROTONIX) tablet 40 mg  40 mg Oral ACB  ciprofloxacin HCl (CIPRO) tablet 125 mg  125 mg Oral DAILY  cetirizine (ZYRTEC) tablet 10 mg  10 mg Oral DAILY  latanoprost (XALATAN) 0.005 % ophthalmic solution 1 Drop  1 Drop Both Eyes QHS  fluticasone furoate (ARNUITY ELLIPTA) 100 mcg/puff  1 Puff Inhalation DAILY  0.9% sodium chloride infusion  25 mL/hr IntraVENous CONTINUOUS Facility-Administered Medications Ordered in Other Encounters Medication Dose Route Frequency  ADDaptor  vancomycin (VANCOCIN) 1,000 mg injection  0.9% sodium chloride (MBP/ADV) 0.9 % infusion  bacitracin 50,000 unit injection

## 2018-10-04 NOTE — PROGRESS NOTES
Problem: Falls - Risk of 
Goal: *Absence of Falls Document Tova Swartz Fall Risk and appropriate interventions in the flowsheet. Fall Risk Interventions: 
Mobility Interventions: Communicate number of staff needed for ambulation/transfer, OT consult for ADLs, Patient to call before getting OOB, PT Consult for mobility concerns, PT Consult for assist device competence Medication Interventions: Patient to call before getting OOB, Teach patient to arise slowly Elimination Interventions: Call light in reach, Patient to call for help with toileting needs History of Falls Interventions: Door open when patient unattended, Room close to nurse's station Problem: Patient Education: Go to Patient Education Activity Goal: Patient/Family Education Outcome: Progressing Towards Goal 
Call bell within reach, rowena upx3.  BSC

## 2018-10-04 NOTE — PROGRESS NOTES
General Surgery End of Shift Nursing Note Bedside shift change report given to Bruce Villa (oncoming nurse) by Mikhail Lanza (offgoing nurse). Report included the following information SBAR, Kardex, Intake/Output, MAR and Recent Results. Shift worked:   C Summary of shift:    0 Issues for physician to address:   0 Number times ambulated in hallway past shift: 0 Number of times OOB to chair past shift: 1 Pain Management: 
Current medication: 0 Patient states pain is manageable on current pain medication: YES 
 
GI: 
 
Current diet:  DIET CLEAR LIQUID Tolerating current diet: YES Passing flatus: YES Last Bowel Movement: today Appearance: 0 Respiratory: 
 
Incentive Spirometer at bedside: YES Patient instructed on use: YES Patient Safety: 
 
Falls Score: 1 Bed Alarm On? No 
Sitter?  No 
 
Patrick Mcwilliams RN

## 2018-10-05 VITALS
SYSTOLIC BLOOD PRESSURE: 134 MMHG | HEART RATE: 72 BPM | WEIGHT: 204 LBS | TEMPERATURE: 98 F | OXYGEN SATURATION: 100 % | BODY MASS INDEX: 37.31 KG/M2 | RESPIRATION RATE: 18 BRPM | DIASTOLIC BLOOD PRESSURE: 70 MMHG

## 2018-10-05 LAB
HCT VFR BLD AUTO: 26.3 % (ref 35–47)
HCT VFR BLD AUTO: 27.1 % (ref 35–47)
HGB BLD-MCNC: 7.9 G/DL (ref 11.5–16)
HGB BLD-MCNC: 8.1 G/DL (ref 11.5–16)

## 2018-10-05 PROCEDURE — 74011250637 HC RX REV CODE- 250/637: Performed by: HOSPITALIST

## 2018-10-05 PROCEDURE — 74011250637 HC RX REV CODE- 250/637: Performed by: GENERAL ACUTE CARE HOSPITAL

## 2018-10-05 PROCEDURE — 74011000250 HC RX REV CODE- 250: Performed by: HOSPITALIST

## 2018-10-05 PROCEDURE — 85018 HEMOGLOBIN: CPT | Performed by: HOSPITALIST

## 2018-10-05 PROCEDURE — 94760 N-INVAS EAR/PLS OXIMETRY 1: CPT

## 2018-10-05 PROCEDURE — 36415 COLL VENOUS BLD VENIPUNCTURE: CPT | Performed by: HOSPITALIST

## 2018-10-05 PROCEDURE — 77010033678 HC OXYGEN DAILY

## 2018-10-05 RX ORDER — SODIUM CHLORIDE 9 MG/ML
250 INJECTION, SOLUTION INTRAVENOUS AS NEEDED
Status: DISCONTINUED | OUTPATIENT
Start: 2018-10-05 | End: 2018-10-05 | Stop reason: HOSPADM

## 2018-10-05 RX ADMIN — FUROSEMIDE 80 MG: 40 TABLET ORAL at 10:06

## 2018-10-05 RX ADMIN — CETIRIZINE HYDROCHLORIDE 10 MG: 10 TABLET, FILM COATED ORAL at 10:06

## 2018-10-05 RX ADMIN — PANTOPRAZOLE SODIUM 40 MG: 40 TABLET, DELAYED RELEASE ORAL at 06:15

## 2018-10-05 RX ADMIN — Medication 10 ML: at 06:15

## 2018-10-05 RX ADMIN — FLUTICASONE FUROATE 1 PUFF: 100 POWDER RESPIRATORY (INHALATION) at 10:06

## 2018-10-05 RX ADMIN — CARVEDILOL 25 MG: 12.5 TABLET, FILM COATED ORAL at 10:06

## 2018-10-05 RX ADMIN — LATANOPROST 1 DROP: 50 SOLUTION OPHTHALMIC at 01:32

## 2018-10-05 RX ADMIN — LISINOPRIL 10 MG: 5 TABLET ORAL at 10:06

## 2018-10-05 RX ADMIN — CIPROFLOXACIN HYDROCHLORIDE 125 MG: 250 TABLET, FILM COATED ORAL at 11:04

## 2018-10-05 RX ADMIN — ATORVASTATIN CALCIUM 40 MG: 40 TABLET, FILM COATED ORAL at 10:06

## 2018-10-05 NOTE — PROGRESS NOTES
Orders received to discharge patient and another order populated to transfuse 1 unit PRBCs. RN called Dr. Artemus Goldberg to clarify. MD confirms that patient is to be discharged but does not need transfusion of 1 unit PRBC. RN re-confirmed that BLOOD IS NOT TO BE GIVEN AND order DC'd per MD order as patient's follow-up Hgb is 8.1.

## 2018-10-05 NOTE — DISCHARGE SUMMARY
Hospitalist Discharge Summary Patient ID: 
Denis Westbrook [id number] 
56 y.o. 
1934 PCP on record: Saad Oakley MD 
 
Admit date: 10/2/2018 Discharge date and time: 10/5/2018 DISCHARGE DIAGNOSIS: 
See below CONSULTATIONS: 
IP CONSULT TO GASTROENTEROLOGY Excerpted HPI from H&P of Jacky Suárez MD: 
Denis Westbrook is a 80 y.o.  female with PMH CAD, chronic systolic chf ef 89-36%, chronic O2 2.5L continuous, diverticulosis presents with acute onset of red blood mixed with stool x 2 at home. In ER, on rectal exam, had red blood coming out of rectum. Since arrival to ER has had 4-5 episodes of moderate blood with dark red clots per family, confirmed to ER nurse who reports patient having episode of passing blood every 1.5 hours. Denies any abdominal pain, nausea, vomiting. Feels like she is feeling diarrhea. Report hx of remote stomach ulcer >30 years ago. No recent melena. Patient on aspirin daily, no other nsaids. ______________________________________________________________________ DISCHARGE SUMMARY/HOSPITAL COURSE:  for full details see H&P, daily progress notes, labs, consult notes. Lower GI bleed, likely due to diverticulosis, POA - resolved 
--allow clear liquid 
--no abdominal pain.  Check lactic 
--hold aspirin.  H&H q8h.  Transfuse if hgb 7 or less 
--GI consult. --if bleeding worsens, get bleeding scan.  Has anaphylactic reaction to IV contrast. 
 
10/3: 
Hgb downtrending. Will transfuse total of 2 units pRBC and continue to monitor q8hrs. GI follow up noted. 10/4: 
H/H improved appropriately after transfusion. Will continue to monitor H/H. Pt had 3 bloody BM overnight as per RN. GI follow up noted. ?SOB - resolved 
-Pt on 2L home O2 - now back on that and without any respiratory complaints. CXR clear. Given Lasix overnight. 10/5: 
H/H stable. GI evals appreciated. Pt stable for discharge.  Spoke with family and updated. Continue holding ASA.    
Chronic Respiratory Failure, on 2L NC home O2 
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 
--hold aspirin due to bleeding 
--continue to holdcoreg, lisinopril, lasix 10/4: Will resume Lisinopril and Lasix.  
   
Remote hx PUD 
--no melena 
--continue PPI 
   
Hx frequent uti 
--continue cipro 
   
Chronic respiratory failure on 2.5L 
--continue O2 and pulmicort.  
_______________________________________________________________________ Patient seen and examined by me on discharge day. Pertinent Findings: 
Gen:    Not in distress Chest: Clear lungs CVS:   Regular rhythm. No edema Abd:  Soft, not distended, not tender Neuro:  Alert, oriented x3 
_______________________________________________________________________ DISCHARGE MEDICATIONS:  
Current Discharge Medication List  
  
CONTINUE these medications which have NOT CHANGED Details  
! ! potassium chloride SR (KLOR-CON 10) 10 mEq tablet Take 10 mEq by mouth daily. Patient takes 10 mEq daily and 20 mEq QHS  
  
! ! potassium chloride SR (KLOR-CON 10) 10 mEq tablet Take 20 mEq by mouth nightly. Patient takes 10 mEq daily and 20 mEq QHS  
  
albuterol (PROVENTIL HFA, VENTOLIN HFA, PROAIR HFA) 90 mcg/actuation inhaler Take 1-2 Puffs by inhalation every four (4) hours as needed for Wheezing or Shortness of Breath. Psyllium Husk-Sucrose (FIBER, PSYLLIUM HUSK/SUGAR,) 3.4 gram/11 gram powd Take 1 Cap by mouth daily. mv-mn/folic acid/calcium/vit K (ONE-A-DAY WOMEN'S 50 PLUS PO) Take 1 Tab by mouth daily. furosemide (LASIX) 80 mg tablet TAKE 1 TABLET BY MOUTH TWICE DAILY Qty: 180 Tab, Refills: 0  
  
nitroglycerin (NITROSTAT) 0.4 mg SL tablet 1 Tab by SubLINGual route every five (5) minutes as needed for Chest Pain (Call 911 if not relieved by 3 tablets). Qty: 50 Tab, Refills: 1  Comments: **Patient requests 90 days supply**  
  
 carvedilol (COREG) 25 mg tablet TAKE 1 TABLET BY MOUTH TWICE DAILY WITH MEALS Qty: 60 Tab, Refills: 6  
  
atorvastatin (LIPITOR) 40 mg tablet TAKE 1 TABLET BY MOUTH DAILY Qty: 90 Tab, Refills: 3  
  
lisinopril (PRINIVIL, ZESTRIL) 10 mg tablet TAKE 1 TABLET BY MOUTH DAILY Qty: 90 Tab, Refills: 3 NEXIUM 40 mg capsule TAKE ONE CAPSULE BY MOUTH DAILY Qty: 30 Cap, Refills: 0  
  
ciprofloxacin (CIPRO) 250 mg tablet Take 125 mg by mouth daily. for UTI prophylaxis  
  
acetaminophen (TYLENOL ARTHRITIS PAIN) 650 mg CR tablet Take 650 mg by mouth every six (6) hours as needed for Pain.  
  
latanoprost (XALATAN) 0.005 % ophthalmic solution Administer 1 Drop to both eyes nightly. cetirizine (ZYRTEC) 10 mg tablet Take 10 mg by mouth daily. budesonide (PULMICORT) 180 mcg/actuation aepb inhaler Take 2 puffs by inhalation two (2) times a day. !! - Potential duplicate medications found. Please discuss with provider. STOP taking these medications  
  
 aspirin delayed-release 81 mg tablet Comments:  
Reason for Stopping: My Recommended Diet, Activity, Wound Care, and follow-up labs are listed in the patient's Discharge Insturctions which I have personally completed and reviewed. ______________________________________________________________________ Risk of deterioration: Low 
 
Condition at Discharge:  Stable 
______________________________________________________________________ Disposition Home with family and home health services 
______________________________________________________________________ Care Plan discussed with:  
Patient, Family, RN, Care Manager, Consultant 
 
______________________________________________________________________ Code Status: Full Code 
______________________________________________________________________ Follow up with: PCP : Marcela Anders MD 
Follow-up Information Follow up With Details Comments Contact Info Elizabeth Manzanares MD   [facility] [address] [phone] 
[phone] Total time in minutes spent coordinating this discharge (includes going over instructions, follow-up, prescriptions, and preparing report for sign off to her PCP) :  35 minutes Signed: 
Vito Marie MD

## 2018-10-05 NOTE — PROGRESS NOTES
GI Progress Note Bonny Chan) NAME:Amalia Ac KLP:[id number] AKV:[id number] ATTG: Oralia Madison MD  PCP: Monica Loyola MD 
Date/Time:  10/5/2018 1250 Assessment:  
· LGIB- suspect diverticular source as seen on prior colonoscopy and given LV painless nature · Acute Blood loss anemia- improved appropriately with transfusion; stable now Plan: · Allow cardiac diet · If rebleeds, get bleeding scan; IV contrast allergy noted with anaphylaxis reported · Consider for discharge home today Will sign off Subjective:  
Discussed with RN events overnight. No further hematochezia. Denies associated abd pain Complaint Y/N Description Abdominal Pain n Hematemesis n Hematochezia n Melena n   
Constipation n   
Diarrhea n Dyspepsia n Dysphagia n   
Jaundiced n   
Nausea/vomiting n Review of Systems: 
Symptom Y/N Comments  Symptom Y/N Comments Fever/Chills n   Chest Pain n   
Cough n   Headaches n Sputum n   Joint Pain n   
SOB/VILLAR n   Pruritis/Rash n Tolerating Diet y clears  Other Could NOT obtain due to:   
 
Objective: VITALS:  
Last 24hrs VS reviewed since prior progress note. Most recent are: 
Visit Vitals  /73 (BP 1 Location: Right arm, BP Patient Position: At rest)  Pulse 74  Temp 98.1 °F (36.7 °C)  Resp 18  Wt 92.5 kg (204 lb)  SpO2 100%  BMI 37.31 kg/m2 No intake or output data in the 24 hours ending 10/05/18 1253 PHYSICAL EXAM: 
General: WD, WN. Alert, cooperative, no acute distress   
HEENT: NC, Atraumatic. PERRL. Anicteric sclerae. Lungs:  CTA Bilaterally. No Wheezing/Rhonchi/Rales. Heart:  Regular  rhythm,  No murmur/Rub/Gallops. +AICD Abdomen: Soft, Non distended, Non tender.  +S Extremities: No c/c/e Neurologic:  CN 2-12 gi, A/O X 3. No acute neurological distress Psych:   Good insight. Not anxious nor agitated. Lab and Radiology Data Reviewed: (see below) Medications Reviewed: (see below) PMH/SH reviewed - no change compared to H&P 
________________________________________________________________________ Total time spent with patient: 15 minutes  
________________________________________________________________________ Care Plan discussed with: 
Patient y Family  y  
RN y  
           Consultant:  y Naomi Dance, MD  
 
Procedures: see electronic medical records for all procedures/Xrays and details which were not copied into this note but were reviewed prior to creation of Plan. LABS: 
Recent Labs 10/05/18 
 1009  10/05/18 
 0139 HGB  8.1*  7.9*  
HCT  27.1*  26.3* No results for input(s): NA, K, CL, CO2, BUN, CREA, GLU, CA, MG, PHOS, URICA in the last 72 hours. No results for input(s): SGOT, GPT, AP, TBIL, TP, ALB, GLOB, GGT, AML, LPSE in the last 72 hours. No lab exists for component: AMYP, HLPSE No results for input(s): INR, PTP, APTT in the last 72 hours. No lab exists for component: INREXT, INREXT No results for input(s): FE, TIBC, PSAT, FERR in the last 72 hours. No results found for: FOL, RBCF No results for input(s): PH, PCO2, PO2 in the last 72 hours. No results for input(s): CPK, CKMB in the last 72 hours. No lab exists for component: TROPONINI Lab Results Component Value Date/Time Color YELLOW/STRAW 08/22/2017 02:47 AM  
 Appearance CLOUDY (A) 08/22/2017 02:47 AM  
 Specific gravity 1.011 08/22/2017 02:47 AM  
 pH (UA) 6.0 08/22/2017 02:47 AM  
 Protein NEGATIVE  08/22/2017 02:47 AM  
 Glucose NEGATIVE  08/22/2017 02:47 AM  
 Ketone NEGATIVE  08/22/2017 02:47 AM  
 Bilirubin NEGATIVE  08/22/2017 02:47 AM  
 Urobilinogen 1.0 08/22/2017 02:47 AM  
 Nitrites NEGATIVE  08/22/2017 02:47 AM  
 Leukocyte Esterase LARGE (A) 08/22/2017 02:47 AM  
 Epithelial cells FEW 08/22/2017 02:47 AM  
 Bacteria 4+ (A) 08/22/2017 02:47 AM  
 WBC  08/22/2017 02:47 AM  
 RBC 5-10 08/22/2017 02:47 AM  
 
 
MEDICATIONS: 
Current Facility-Administered Medications Medication Dose Route Frequency  lisinopril (PRINIVIL, ZESTRIL) tablet 10 mg  10 mg Oral DAILY  furosemide (LASIX) tablet 80 mg  80 mg Oral BID  
 0.9% sodium chloride infusion 250 mL  250 mL IntraVENous PRN  
 sodium chloride (NS) flush 5-10 mL  5-10 mL IntraVENous Q8H  
 sodium chloride (NS) flush 5-10 mL  5-10 mL IntraVENous PRN  
 acetaminophen (TYLENOL) tablet 650 mg  650 mg Oral Q6H PRN  
 ondansetron (ZOFRAN) injection 4 mg  4 mg IntraVENous Q6H PRN  potassium chloride SR (KLOR-CON 10) tablet 20 mEq  20 mEq Oral QHS  nitroglycerin (NITROSTAT) tablet 0.4 mg  0.4 mg SubLINGual Q5MIN PRN  
 carvedilol (COREG) tablet 25 mg  25 mg Oral BID WITH MEALS  
 atorvastatin (LIPITOR) tablet 40 mg  40 mg Oral DAILY  pantoprazole (PROTONIX) tablet 40 mg  40 mg Oral ACB  ciprofloxacin HCl (CIPRO) tablet 125 mg  125 mg Oral DAILY  cetirizine (ZYRTEC) tablet 10 mg  10 mg Oral DAILY  latanoprost (XALATAN) 0.005 % ophthalmic solution 1 Drop  1 Drop Both Eyes QHS  fluticasone furoate (ARNUITY ELLIPTA) 100 mcg/puff  1 Puff Inhalation DAILY  0.9% sodium chloride infusion  25 mL/hr IntraVENous CONTINUOUS Facility-Administered Medications Ordered in Other Encounters Medication Dose Route Frequency  ADDaptor  vancomycin (VANCOCIN) 1,000 mg injection  0.9% sodium chloride (MBP/ADV) 0.9 % infusion  bacitracin 50,000 unit injection

## 2018-10-05 NOTE — PROGRESS NOTES
Reason for Admission:   Acute lower GI Bleed RRAT Score:  18 Do you (patient/family) have any concerns for transition/discharge? Pt does not have any questions at this time Plan for utilizing home health: CM explained to pt and family by bedside that MD has recommended possible HH at d/c.  CM explained what [facility] assist with and pt has declined. Pt's family has declined services due to other family members in the home to assist with pt's care Likelihood of readmission? HIGH Transition of Care Plan:       
 
CM completed room assessment with pt with family by bedside. Pt reported that she needs limited assistance with ADLs, and she does not drive. Pt resides with family in their one story home ( no steps into main entrance). Pt is active with PCP: seen July 201* and uses [facility]. Pt reported DME: walker, wheelchair, Home O2. Pt reported no SNF/HH. Pt has declined home health but is aware that she can contact her PCP for referral to snf. Care Management Interventions PCP Verified by CM: Yes Mode of Transport at Discharge: Other (see comment) Transition of Care Consult (CM Consult): Discharge Planning Discharge Durable Medical Equipment: No 
Physical Therapy Consult: No 
Occupational Therapy Consult: No 
Speech Therapy Consult: No 
Current Support Network: Lives Alone, Own Home Confirm Follow Up Transport: Family Plan discussed with Pt/Family/Caregiver: Yes Discharge Location Discharge Placement: Home [place] 
[phone]

## 2018-11-26 ENCOUNTER — CLINICAL SUPPORT (OUTPATIENT)
Dept: CARDIOLOGY CLINIC | Age: 83
End: 2018-11-26

## 2018-11-26 DIAGNOSIS — I49.5 SICK SINUS SYNDROME (HCC): Primary | ICD-10-CM

## 2018-12-07 ENCOUNTER — APPOINTMENT (OUTPATIENT)
Dept: GENERAL RADIOLOGY | Age: 83
DRG: 293 | End: 2018-12-07
Attending: PHYSICIAN ASSISTANT
Payer: MEDICARE

## 2018-12-07 ENCOUNTER — HOSPITAL ENCOUNTER (INPATIENT)
Age: 83
LOS: 3 days | Discharge: HOME | DRG: 293 | End: 2018-12-10
Attending: EMERGENCY MEDICINE | Admitting: HOSPITALIST
Payer: MEDICARE

## 2018-12-07 ENCOUNTER — APPOINTMENT (OUTPATIENT)
Dept: CT IMAGING | Age: 83
DRG: 293 | End: 2018-12-07
Attending: HOSPITALIST
Payer: MEDICARE

## 2018-12-07 DIAGNOSIS — E87.70 HYPERVOLEMIA, UNSPECIFIED HYPERVOLEMIA TYPE: ICD-10-CM

## 2018-12-07 DIAGNOSIS — R06.02 SHORTNESS OF BREATH: Primary | ICD-10-CM

## 2018-12-07 PROBLEM — I50.9 HEART FAILURE (HCC): Status: ACTIVE | Noted: 2018-12-07

## 2018-12-07 LAB
ALBUMIN SERPL-MCNC: 3.9 G/DL (ref 3.5–5)
ALBUMIN/GLOB SERPL: 0.8 {RATIO} (ref 1.1–2.2)
ALP SERPL-CCNC: 89 U/L (ref 45–117)
ALT SERPL-CCNC: 22 U/L (ref 12–78)
ANION GAP SERPL CALC-SCNC: 5 MMOL/L (ref 5–15)
APPEARANCE UR: CLEAR
APTT PPP: 30.2 SEC (ref 22.1–32)
ARTERIAL PATENCY WRIST A: ABNORMAL
AST SERPL-CCNC: 22 U/L (ref 15–37)
ATRIAL RATE: 87 BPM
BACTERIA URNS QL MICRO: NEGATIVE /HPF
BASE EXCESS BLDA CALC-SCNC: 9.7 MMOL/L
BASOPHILS # BLD: 0 K/UL (ref 0–0.1)
BASOPHILS NFR BLD: 0 % (ref 0–1)
BDY SITE: ABNORMAL
BILIRUB SERPL-MCNC: 0.9 MG/DL (ref 0.2–1)
BILIRUB UR QL: NEGATIVE
BNP SERPL-MCNC: 3384 PG/ML (ref 0–450)
BREATHS.SPONTANEOUS ON VENT: 18
BUN SERPL-MCNC: 17 MG/DL (ref 6–20)
BUN/CREAT SERPL: 16 (ref 12–20)
CALCIUM SERPL-MCNC: 8.8 MG/DL (ref 8.5–10.1)
CALCULATED P AXIS, ECG09: 33 DEGREES
CALCULATED R AXIS, ECG10: -46 DEGREES
CALCULATED T AXIS, ECG11: 78 DEGREES
CHLORIDE SERPL-SCNC: 99 MMOL/L (ref 97–108)
CK MB CFR SERPL CALC: NORMAL % (ref 0–2.5)
CK MB SERPL-MCNC: <1 NG/ML (ref 5–25)
CK SERPL-CCNC: 63 U/L (ref 26–192)
CO2 SERPL-SCNC: 37 MMOL/L (ref 21–32)
COLOR UR: ABNORMAL
CREAT SERPL-MCNC: 1.04 MG/DL (ref 0.55–1.02)
DIAGNOSIS, 93000: NORMAL
DIFFERENTIAL METHOD BLD: ABNORMAL
EOSINOPHIL # BLD: 0.1 K/UL (ref 0–0.4)
EOSINOPHIL NFR BLD: 2 % (ref 0–7)
EPITH CASTS URNS QL MICRO: ABNORMAL /LPF
ERYTHROCYTE [DISTWIDTH] IN BLOOD BY AUTOMATED COUNT: 14.1 % (ref 11.5–14.5)
GAS FLOW.O2 O2 DELIVERY SYS: 2.5 L/MIN
GLOBULIN SER CALC-MCNC: 4.7 G/DL (ref 2–4)
GLUCOSE SERPL-MCNC: 93 MG/DL (ref 65–100)
GLUCOSE UR STRIP.AUTO-MCNC: NEGATIVE MG/DL
HCO3 BLDA-SCNC: 37 MMOL/L (ref 22–26)
HCT VFR BLD AUTO: 36 % (ref 35–47)
HGB BLD-MCNC: 10.3 G/DL (ref 11.5–16)
HGB UR QL STRIP: ABNORMAL
HYALINE CASTS URNS QL MICRO: ABNORMAL /LPF (ref 0–5)
IMM GRANULOCYTES # BLD: 0 K/UL (ref 0–0.04)
IMM GRANULOCYTES NFR BLD AUTO: 0 % (ref 0–0.5)
INR PPP: 1.2 (ref 0.9–1.1)
KETONES UR QL STRIP.AUTO: NEGATIVE MG/DL
LEUKOCYTE ESTERASE UR QL STRIP.AUTO: ABNORMAL
LYMPHOCYTES # BLD: 1 K/UL (ref 0.8–3.5)
LYMPHOCYTES NFR BLD: 14 % (ref 12–49)
MCH RBC QN AUTO: 26.5 PG (ref 26–34)
MCHC RBC AUTO-ENTMCNC: 28.6 G/DL (ref 30–36.5)
MCV RBC AUTO: 92.5 FL (ref 80–99)
MONOCYTES # BLD: 0.8 K/UL (ref 0–1)
MONOCYTES NFR BLD: 11 % (ref 5–13)
NEUTS SEG # BLD: 5 K/UL (ref 1.8–8)
NEUTS SEG NFR BLD: 73 % (ref 32–75)
NITRITE UR QL STRIP.AUTO: NEGATIVE
NRBC # BLD: 0 K/UL (ref 0–0.01)
NRBC BLD-RTO: 0 PER 100 WBC
P-R INTERVAL, ECG05: 164 MS
PCO2 BLDA: 60 MMHG (ref 35–45)
PH BLDA: 7.41 [PH] (ref 7.35–7.45)
PH UR STRIP: 7 [PH] (ref 5–8)
PLATELET # BLD AUTO: 289 K/UL (ref 150–400)
PMV BLD AUTO: 9.3 FL (ref 8.9–12.9)
PO2 BLDA: 91 MMHG (ref 80–100)
POTASSIUM SERPL-SCNC: 3.9 MMOL/L (ref 3.5–5.1)
PROT SERPL-MCNC: 8.6 G/DL (ref 6.4–8.2)
PROT UR STRIP-MCNC: NEGATIVE MG/DL
PROTHROMBIN TIME: 11.8 SEC (ref 9–11.1)
Q-T INTERVAL, ECG07: 398 MS
QRS DURATION, ECG06: 120 MS
QTC CALCULATION (BEZET), ECG08: 478 MS
RBC # BLD AUTO: 3.89 M/UL (ref 3.8–5.2)
RBC #/AREA URNS HPF: ABNORMAL /HPF (ref 0–5)
RBC MORPH BLD: ABNORMAL
SAO2 % BLD: 97 % (ref 92–97)
SAO2% DEVICE SAO2% SENSOR NAME: ABNORMAL
SODIUM SERPL-SCNC: 141 MMOL/L (ref 136–145)
SP GR UR REFRACTOMETRY: 1.01 (ref 1–1.03)
SPECIMEN SITE: ABNORMAL
THERAPEUTIC RANGE,PTTT: NORMAL SECS (ref 58–77)
TROPONIN I SERPL-MCNC: <0.05 NG/ML
UA: UC IF INDICATED,UAUC: ABNORMAL
UROBILINOGEN UR QL STRIP.AUTO: 1 EU/DL (ref 0.2–1)
VENTRICULAR RATE, ECG03: 87 BPM
WBC # BLD AUTO: 6.9 K/UL (ref 3.6–11)
WBC URNS QL MICRO: ABNORMAL /HPF (ref 0–4)

## 2018-12-07 PROCEDURE — 77010033678 HC OXYGEN DAILY

## 2018-12-07 PROCEDURE — 80053 COMPREHEN METABOLIC PANEL: CPT

## 2018-12-07 PROCEDURE — 65660000000 HC RM CCU STEPDOWN

## 2018-12-07 PROCEDURE — 82803 BLOOD GASES ANY COMBINATION: CPT

## 2018-12-07 PROCEDURE — 36415 COLL VENOUS BLD VENIPUNCTURE: CPT

## 2018-12-07 PROCEDURE — 71250 CT THORAX DX C-: CPT

## 2018-12-07 PROCEDURE — 74011250637 HC RX REV CODE- 250/637: Performed by: HOSPITALIST

## 2018-12-07 PROCEDURE — 93005 ELECTROCARDIOGRAM TRACING: CPT

## 2018-12-07 PROCEDURE — 84484 ASSAY OF TROPONIN QUANT: CPT

## 2018-12-07 PROCEDURE — 74011250636 HC RX REV CODE- 250/636: Performed by: EMERGENCY MEDICINE

## 2018-12-07 PROCEDURE — 87086 URINE CULTURE/COLONY COUNT: CPT

## 2018-12-07 PROCEDURE — 85610 PROTHROMBIN TIME: CPT

## 2018-12-07 PROCEDURE — 74011000250 HC RX REV CODE- 250: Performed by: HOSPITALIST

## 2018-12-07 PROCEDURE — 83880 ASSAY OF NATRIURETIC PEPTIDE: CPT

## 2018-12-07 PROCEDURE — 36600 WITHDRAWAL OF ARTERIAL BLOOD: CPT

## 2018-12-07 PROCEDURE — 96374 THER/PROPH/DIAG INJ IV PUSH: CPT

## 2018-12-07 PROCEDURE — 81001 URINALYSIS AUTO W/SCOPE: CPT

## 2018-12-07 PROCEDURE — 82550 ASSAY OF CK (CPK): CPT

## 2018-12-07 PROCEDURE — 99285 EMERGENCY DEPT VISIT HI MDM: CPT

## 2018-12-07 PROCEDURE — 71046 X-RAY EXAM CHEST 2 VIEWS: CPT

## 2018-12-07 PROCEDURE — 85730 THROMBOPLASTIN TIME PARTIAL: CPT

## 2018-12-07 PROCEDURE — 85025 COMPLETE CBC W/AUTO DIFF WBC: CPT

## 2018-12-07 RX ORDER — PANTOPRAZOLE SODIUM 40 MG/1
40 TABLET, DELAYED RELEASE ORAL DAILY PRN
Status: DISCONTINUED | OUTPATIENT
Start: 2018-12-08 | End: 2018-12-10 | Stop reason: HOSPADM

## 2018-12-07 RX ORDER — CARVEDILOL 12.5 MG/1
25 TABLET ORAL 2 TIMES DAILY WITH MEALS
Status: DISCONTINUED | OUTPATIENT
Start: 2018-12-07 | End: 2018-12-10 | Stop reason: HOSPADM

## 2018-12-07 RX ORDER — LATANOPROST 50 UG/ML
1 SOLUTION/ DROPS OPHTHALMIC
Status: DISCONTINUED | OUTPATIENT
Start: 2018-12-07 | End: 2018-12-10 | Stop reason: HOSPADM

## 2018-12-07 RX ORDER — IPRATROPIUM BROMIDE AND ALBUTEROL SULFATE 2.5; .5 MG/3ML; MG/3ML
3 SOLUTION RESPIRATORY (INHALATION)
Status: DISCONTINUED | OUTPATIENT
Start: 2018-12-07 | End: 2018-12-10 | Stop reason: HOSPADM

## 2018-12-07 RX ORDER — SODIUM CHLORIDE 0.9 % (FLUSH) 0.9 %
5-10 SYRINGE (ML) INJECTION AS NEEDED
Status: DISCONTINUED | OUTPATIENT
Start: 2018-12-07 | End: 2018-12-10 | Stop reason: HOSPADM

## 2018-12-07 RX ORDER — NALOXONE HYDROCHLORIDE 0.4 MG/ML
0.4 INJECTION, SOLUTION INTRAMUSCULAR; INTRAVENOUS; SUBCUTANEOUS AS NEEDED
Status: DISCONTINUED | OUTPATIENT
Start: 2018-12-07 | End: 2018-12-10 | Stop reason: HOSPADM

## 2018-12-07 RX ORDER — SODIUM CHLORIDE 0.9 % (FLUSH) 0.9 %
5-10 SYRINGE (ML) INJECTION EVERY 8 HOURS
Status: DISCONTINUED | OUTPATIENT
Start: 2018-12-07 | End: 2018-12-10 | Stop reason: HOSPADM

## 2018-12-07 RX ORDER — ACETAMINOPHEN 325 MG/1
650 TABLET ORAL
Status: DISCONTINUED | OUTPATIENT
Start: 2018-12-07 | End: 2018-12-10 | Stop reason: HOSPADM

## 2018-12-07 RX ORDER — FUROSEMIDE 10 MG/ML
80 INJECTION INTRAMUSCULAR; INTRAVENOUS
Status: COMPLETED | OUTPATIENT
Start: 2018-12-07 | End: 2018-12-07

## 2018-12-07 RX ORDER — NITROGLYCERIN 0.4 MG/1
0.4 TABLET SUBLINGUAL
Status: DISCONTINUED | OUTPATIENT
Start: 2018-12-07 | End: 2018-12-10 | Stop reason: HOSPADM

## 2018-12-07 RX ORDER — FUROSEMIDE 10 MG/ML
80 INJECTION INTRAMUSCULAR; INTRAVENOUS EVERY 12 HOURS
Status: DISCONTINUED | OUTPATIENT
Start: 2018-12-08 | End: 2018-12-10 | Stop reason: HOSPADM

## 2018-12-07 RX ORDER — CIPROFLOXACIN 250 MG/1
125 TABLET, FILM COATED ORAL DAILY
Status: DISCONTINUED | OUTPATIENT
Start: 2018-12-08 | End: 2018-12-08

## 2018-12-07 RX ORDER — ENOXAPARIN SODIUM 100 MG/ML
40 INJECTION SUBCUTANEOUS DAILY
Status: DISCONTINUED | OUTPATIENT
Start: 2018-12-08 | End: 2018-12-07

## 2018-12-07 RX ORDER — ATORVASTATIN CALCIUM 40 MG/1
40 TABLET, FILM COATED ORAL DAILY
Status: DISCONTINUED | OUTPATIENT
Start: 2018-12-08 | End: 2018-12-10 | Stop reason: HOSPADM

## 2018-12-07 RX ORDER — ONDANSETRON 2 MG/ML
4 INJECTION INTRAMUSCULAR; INTRAVENOUS
Status: DISCONTINUED | OUTPATIENT
Start: 2018-12-07 | End: 2018-12-10 | Stop reason: HOSPADM

## 2018-12-07 RX ADMIN — FUROSEMIDE 80 MG: 10 INJECTION, SOLUTION INTRAMUSCULAR; INTRAVENOUS at 14:37

## 2018-12-07 RX ADMIN — LATANOPROST 1 DROP: 50 SOLUTION OPHTHALMIC at 23:20

## 2018-12-07 RX ADMIN — Medication 10 ML: at 21:06

## 2018-12-07 RX ADMIN — CARVEDILOL 25 MG: 12.5 TABLET, FILM COATED ORAL at 21:06

## 2018-12-07 NOTE — ED PROVIDER NOTES
EMERGENCY DEPARTMENT HISTORY AND PHYSICAL EXAM 
 
 
Date: 12/7/2018 Patient Name: Nitza Pride PROVIDER IN TRIAGE NOTE: 
12:18 PM 
Eliseo Good PA-C has evaluated the patient as the Provider in Triage (PIT) for worsening SOB and swelling to the legs. The vital signs and the triage nurse assessment have been reviewed. The patient and any available family have been advised that the appropriate studies have been ordered to initiate the work up based on the clinical presentation during the assessment. The pt has been advised that they will be accommodated in monitored ED bed as soon as possible. The pt has been requested to contact the triage nurse or PIT immediately if they experiences any changes in their condition during this brief waiting period. 12:19 PM 
Patient was to get ED but refused until after she had a change to use the restroom causing a delay in getting the EKG. History of Presenting Illness Chief Complaint Patient presents with  Shortness of Breath Pt wheeled to triage with granddaughter present with c/o SOB, swelling to feet ankle and legs; referred by [facility]; pt with hx of CHF; denies CP History Provided By: Patient and Patient's Daughter HPI: Nitza Pride, 80 y.o. female with PMHx significant for CHF, CAD, HTN, asthma, presents via wheelchair to the ED with c/o acute exacerbation in chronic SOB. Per daughter, over the past few days, she has noted pt becomes very SOB after talking or any exertion. Her daughter reports bilateral lower extremitiy swelling that has been worsening for the past 1 week. Pt is currently prescribed 80mg Lasix BID. She states she has had increased salt intake in her diet. Per daughter, they were on the way to see pt's pulmonologist at [facility], but tried to see cardiology instead. However, they were referred here to the ED for evaluation.  Pt conveys feeling like she is retaining fluid and believes she may has gained a few pounds. Per daughter, pt is wheelchair bound. Her daughter also states pt is on 2.5L NC at baseline, but they have increased O2 to 4L due to SOB. Pt specifically denies any recent fevers, CP, or N/V. There are no other complaints, changes, or physical findings at this time. PCP: Vangie Dudley MD 
 
Current Outpatient Medications Medication Sig Dispense Refill  furosemide (LASIX) 80 mg tablet TAKE 1 TABLET BY MOUTH TWICE DAILY 180 Tab 0  
 potassium chloride SR (KLOR-CON 10) 10 mEq tablet Take 10 mEq by mouth daily. Patient takes 10 mEq daily and 20 mEq QHS  potassium chloride SR (KLOR-CON 10) 10 mEq tablet Take 20 mEq by mouth nightly. Patient takes 10 mEq daily and 20 mEq QHS  albuterol (PROVENTIL HFA, VENTOLIN HFA, PROAIR HFA) 90 mcg/actuation inhaler Take 1-2 Puffs by inhalation every four (4) hours as needed for Wheezing or Shortness of Breath.  Psyllium Husk-Sucrose (FIBER, PSYLLIUM HUSK/SUGAR,) 3.4 gram/11 gram powd Take 1 Cap by mouth daily.  mv-mn/folic acid/calcium/vit K (ONE-A-DAY WOMEN'S 50 PLUS PO) Take 1 Tab by mouth daily.  nitroglycerin (NITROSTAT) 0.4 mg SL tablet 1 Tab by SubLINGual route every five (5) minutes as needed for Chest Pain (Call 911 if not relieved by 3 tablets). 50 Tab 1  carvedilol (COREG) 25 mg tablet TAKE 1 TABLET BY MOUTH TWICE DAILY WITH MEALS 60 Tab 6  
 atorvastatin (LIPITOR) 40 mg tablet TAKE 1 TABLET BY MOUTH DAILY 90 Tab 3  
 lisinopril (PRINIVIL, ZESTRIL) 10 mg tablet TAKE 1 TABLET BY MOUTH DAILY 90 Tab 3  
 NEXIUM 40 mg capsule TAKE ONE CAPSULE BY MOUTH DAILY 30 Cap 0  
 budesonide (PULMICORT) 180 mcg/actuation aepb inhaler Take 2 puffs by inhalation two (2) times a day.  ciprofloxacin (CIPRO) 250 mg tablet Take 125 mg by mouth daily. for UTI prophylaxis  acetaminophen (TYLENOL ARTHRITIS PAIN) 650 mg CR tablet Take 650 mg by mouth every six (6) hours as needed for Pain.  latanoprost (XALATAN) 0.005 % ophthalmic solution Administer 1 Drop to both eyes nightly.  cetirizine (ZYRTEC) 10 mg tablet Take 10 mg by mouth daily. Facility-Administered Medications Ordered in Other Encounters Medication Dose Route Frequency Provider Last Rate Last Dose  ADDaptor  vancomycin (VANCOCIN) 1,000 mg injection  0.9% sodium chloride (MBP/ADV) 0.9 % infusion  bacitracin 50,000 unit injection Past History Past Medical History: 
Past Medical History:  
Diagnosis Date  Asthma  Autoimmune disease ([address] lupus  CAD (coronary artery disease)   
 cardiac cath  CAD (coronary artery disease) sick sinus syndrome  Essential hypertension  GERD (gastroesophageal reflux disease)  Glaucoma  Heart failure ([address] Lupus  Hypertension  Other ill-defined conditions(799.89)  Pacemaker  PUD (peptic ulcer disease)  S/P implantation of automatic cardioverter/defibrillator (AICD) 1/14/2015 [address] upgrade to AICD implant  SSS (sick sinus syndrome) ([address] Past Surgical History: 
Past Surgical History:  
Procedure Laterality Date  CARDIAC SURG PROCEDURE UNLIST    
 pacemaker/defibrilator.  HX GYN    
 BTL  
 HX PACEMAKER    
 MN COLONOSCOPY W/BIOPSY SINGLE/MULTIPLE  3/26/2012  MN COLSC FLX W/RMVL OF TUMOR POLYP LESION SNARE TQ  3/26/2012 Family History: 
Family History Problem Relation Age of Onset  Arrhythmia Mother  Hypertension Mother  Hypertension Father Social History: 
Social History Tobacco Use  Smoking status: Never Smoker  Smokeless tobacco: Never Used Substance Use Topics  Alcohol use: No  
  Alcohol/week: 0.0 oz  Drug use: No  
 
 
Allergies: Allergies Allergen Reactions  Contrast Agent [Iodine] Anaphylaxis Made aware of allergy 1/4/13  Sulfa (Sulfonamide Antibiotics) Hives  Codeine Anxiety  Pcn [Penicillins] Anaphylaxis Reaction was to penicillin injections into buttocks. She can take oral amoxicillin Review of Systems Review of Systems Constitutional: Positive for unexpected weight change (possible). Negative for chills and fever. HENT: Negative for congestion. Eyes: Negative for visual disturbance. Respiratory: Positive for shortness of breath. Negative for cough and chest tightness. Cardiovascular: Positive for leg swelling. Negative for chest pain. Gastrointestinal: Negative for abdominal pain and vomiting. Endocrine: Negative for polyuria. Genitourinary: Negative for dysuria and frequency. Musculoskeletal: Negative for myalgias. Skin: Negative for color change. Allergic/Immunologic: Negative for immunocompromised state. Neurological: Negative for numbness. Physical Exam  
Physical Exam  
Nursing note and vitals reviewed. General appearance: non-toxic, mild respiratory distress Eyes: PERRL, EOMI, conjunctiva normal, anicteric sclera HEENT: mucous membranes moist, oropharynx is clear, upper and lower dentures in place Neck: JVD present Pulmonary: tachypneic, muscle accessory usage, crackles in bilateral bases, dull percussion to bases Cardiac: normal rate and regular rhythm, no murmurs, gallops, or rubs, 2+DP pulses, 2+ radial pulses, pacemaker L chest wall Abdomen: soft, nontender, nondistended, bowel sounds present MSK: 2-3+ edema bilateral lower extremities Neuro: Alert, answers questions appropriately Skin: capillary refill brisk Diagnostic Study Results Labs - Recent Results (from the past 12 hour(s)) EKG, 12 LEAD, INITIAL Collection Time: 12/07/18 12:35 PM  
Result Value Ref Range Ventricular Rate 87 BPM  
 Atrial Rate 87 BPM  
 P-R Interval 164 ms QRS Duration 120 ms  
 Q-T Interval 398 ms QTC Calculation (Bezet) 478 ms Calculated P Axis 33 degrees Calculated R Axis -46 degrees Calculated T Axis 78 degrees Diagnosis Sinus rhythm with occasional premature ventricular complexes Left anterior fascicular block Left ventricular hypertrophy with QRS widening When compared with ECG of 22-AUG-2017 01:32, 
premature ventricular complexes are now present URINALYSIS W/ REFLEX CULTURE Collection Time: 12/07/18 12:37 PM  
Result Value Ref Range Color YELLOW/STRAW Appearance CLEAR CLEAR Specific gravity 1.009 1.003 - 1.030    
 pH (UA) 7.0 5.0 - 8.0 Protein NEGATIVE  NEG mg/dL Glucose NEGATIVE  NEG mg/dL Ketone NEGATIVE  NEG mg/dL Bilirubin NEGATIVE  NEG Blood SMALL (A) NEG Urobilinogen 1.0 0.2 - 1.0 EU/dL Nitrites NEGATIVE  NEG Leukocyte Esterase SMALL (A) NEG    
 WBC 10-20 0 - 4 /hpf  
 RBC 5-10 0 - 5 /hpf Epithelial cells FEW FEW /lpf Bacteria NEGATIVE  NEG /hpf  
 UA:UC IF INDICATED URINE CULTURE ORDERED (A) CNI Hyaline cast 2-5 0 - 5 /lpf  
CBC WITH AUTOMATED DIFF Collection Time: 12/07/18  1:29 PM  
Result Value Ref Range WBC 6.9 3.6 - 11.0 K/uL  
 RBC 3.89 3.80 - 5.20 M/uL  
 HGB 10.3 (L) 11.5 - 16.0 g/dL HCT 36.0 35.0 - 47.0 % MCV 92.5 80.0 - 99.0 FL  
 MCH 26.5 26.0 - 34.0 PG  
 MCHC 28.6 (L) 30.0 - 36.5 g/dL  
 RDW 14.1 11.5 - 14.5 % PLATELET 265 902 - 286 K/uL MPV 9.3 8.9 - 12.9 FL  
 NRBC 0.0 0  WBC ABSOLUTE NRBC 0.00 0.00 - 0.01 K/uL NEUTROPHILS 73 32 - 75 % LYMPHOCYTES 14 12 - 49 % MONOCYTES 11 5 - 13 % EOSINOPHILS 2 0 - 7 % BASOPHILS 0 0 - 1 % IMMATURE GRANULOCYTES 0 0.0 - 0.5 % ABS. NEUTROPHILS 5.0 1.8 - 8.0 K/UL  
 ABS. LYMPHOCYTES 1.0 0.8 - 3.5 K/UL  
 ABS. MONOCYTES 0.8 0.0 - 1.0 K/UL  
 ABS. EOSINOPHILS 0.1 0.0 - 0.4 K/UL  
 ABS. BASOPHILS 0.0 0.0 - 0.1 K/UL  
 ABS. IMM. GRANS. 0.0 0.00 - 0.04 K/UL  
 DF AUTOMATED    
 RBC COMMENTS NORMOCYTIC, NORMOCHROMIC METABOLIC PANEL, COMPREHENSIVE  
 Collection Time: 12/07/18  1:29 PM  
Result Value Ref Range Sodium 141 136 - 145 mmol/L Potassium 3.9 3.5 - 5.1 mmol/L Chloride 99 97 - 108 mmol/L  
 CO2 37 (H) 21 - 32 mmol/L Anion gap 5 5 - 15 mmol/L Glucose 93 65 - 100 mg/dL BUN 17 6 - 20 MG/DL Creatinine 1.04 (H) 0.55 - 1.02 MG/DL  
 BUN/Creatinine ratio 16 12 - 20 GFR est AA >60 >60 ml/min/1.73m2 GFR est non-AA 50 (L) >60 ml/min/1.73m2 Calcium 8.8 8.5 - 10.1 MG/DL Bilirubin, total 0.9 0.2 - 1.0 MG/DL  
 ALT (SGPT) 22 12 - 78 U/L  
 AST (SGOT) 22 15 - 37 U/L Alk. phosphatase 89 45 - 117 U/L Protein, total 8.6 (H) 6.4 - 8.2 g/dL Albumin 3.9 3.5 - 5.0 g/dL Globulin 4.7 (H) 2.0 - 4.0 g/dL A-G Ratio 0.8 (L) 1.1 - 2.2 NT-PRO BNP Collection Time: 12/07/18  1:29 PM  
Result Value Ref Range NT pro-BNP 3,384 (H) 0 - 450 PG/ML  
TROPONIN I Collection Time: 12/07/18  1:29 PM  
Result Value Ref Range Troponin-I, Qt. <0.05 <0.05 ng/mL CK W/ CKMB & INDEX Collection Time: 12/07/18  1:29 PM  
Result Value Ref Range CK 63 26 - 192 U/L  
 CK - MB <1.0 <3.6 NG/ML  
 CK-MB Index Cannot be calculated 0 - 2.5 BLOOD GAS, ARTERIAL Collection Time: 12/07/18  3:21 PM  
Result Value Ref Range pH 7.41 7.35 - 7.45    
 PCO2 60 (H) 35.0 - 45.0 mmHg PO2 91 80 - 100 mmHg O2 SAT 97 92 - 97 % BICARBONATE 37 (H) 22 - 26 mmol/L  
 BASE EXCESS 9.7 mmol/L  
 O2 METHOD NASAL O2    
 O2 FLOW RATE 2.50 L/min SPONTANEOUS RATE 18.0 Sample source ARTERIAL    
 SITE RIGHT BRACHIAL SOM'S TEST N/A Radiologic Studies -  
XR CHEST PA LAT Final Result IMPRESSION: No acute cardiopulmonary disease. CT Results  (Last 48 hours) None CXR Results  (Last 48 hours) 12/07/18 1315  XR CHEST PA LAT Final result Impression:  IMPRESSION: No acute cardiopulmonary disease. Narrative: Indication: Shortness of breath. Exam: PA and lateral views of the chest.  
   
Direct comparison is made to prior CXR dated October 4, 2018. Findings: Cardiomediastinal silhouette is stable. Intact pacer leads extend to  
the right atrium and right ventricle. There is persistent moderate elevation of  
the right hemidiaphragm. Lungs are clear bilaterally. Pleural spaces are normal.  
Osseous structures are intact. Medical Decision Making I am the first provider for this patient. I reviewed the vital signs, available nursing notes, past medical history, past surgical history, family history and social history. Vital Signs-Reviewed the patient's vital signs. Patient Vitals for the past 12 hrs: 
 Temp Pulse Resp BP SpO2  
12/07/18 1217 98.2 °F (36.8 °C) 84 18 129/72 98 % Pulse Oximetry Analysis - 98% on 2.5L 
 
EKG interpretation: 1235 Rhythm: sinus rhythm with occasional premature ventricular complexes and Regular. Rate (approx.): 87; Axis: normal; ST/T wave: no ST elevation, no ST depression; other findings: left anterior fascicular block. Left ventricular hypertrophy with QRS widening SC interval 164 ms,  ms, QTc 478 ms. Records Reviewed: Nursing Notes, Old Medical Records, Previous electrocardiograms, Previous Radiology Studies and Previous Laboratory Studies Provider Notes (Medical Decision Making):  
Appears somewhat hypovolemic, suspect mild volume overload on CXR. Suspect dietary noncompliance contributing to etiology, possible worsening systolic and/or diastolic HF, lower suspicion for PE given clinical exam.  
 
ED Course:  
Initial assessment performed. The patients presenting problems have been discussed, and they are in agreement with the care plan formulated and outlined with them. I have encouraged them to ask questions as they arise throughout their visit. CONSULT NOTE:  
4:15 PM 
Alessandro Zapata. Angela Kang MD spoke with Dr. Piero Marie, Specialty: cardiology Discussed pt's hx, disposition, and available diagnostic and imaging results. Reviewed care plans. Consultant agrees with plans as outlined. He recommends hospitalist admission and they will consult. CONSULT NOTE:  
4:41 PM 
Ariana Wood MD spoke with Dr. Levon Gongora, Specialty: Hospitalist 
Discussed pt's hx, disposition, and available diagnostic and imaging results. Reviewed care plans. Consultant will evaluate pt for admission. PLAN: 
1. Admit to Hospitalist 
 
Admission Note: 
4:41 PM 
Patient is being admitted to the hospital by Dr. Baron Westbrook. The results of their tests and reasons for their admission have been discussed with them and available family. They convey agreement and understanding for the need to be admitted and for their admission diagnosis. Diagnosis Clinical Impression: 1. Shortness of breath 2. Hypervolemia, unspecified hypervolemia type Attestations: This note is prepared by Henny Jones, acting as Scribe for Ariana Wood MD. 
 
The scribe's documentation has been prepared under my direction and personally reviewed by me in its entirety. I confirm that the note above accurately reflects all work, treatment, procedures, and medical decision making performed by me. Ariana Wood MD

## 2018-12-07 NOTE — H&P
Hospitalist Admission NoteNAME: Bianka Hensley :  1934 MRN:  [id number] Date/Time:  2018 6:13 PM 
 
Patient PCP: Casimiro Caballero MD 
______________________________________________________________________ Given the patient's current clinical presentation, I have a high level of concern for decompensation if discharged from the emergency department. Complex decision making was performed, which includes reviewing the patient's available past medical records, laboratory results, and x-ray films. My assessment of this patient's clinical condition and my plan of care is as follows. Assessment / Plan: 
Acute on chronic hypoxic respiratory failure due to acute on chronic systolic HF  
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  
HTN 
-BP/HR stable 
+ VILLAR/worsening LE edema/ weight gain subjectively Increased O2 requirements   
-aggressive diureses with lasix IV Close monitoring daily weight /cr/K  
-clinically low suspicious for PE but cannot be excluded for 100% cxray reported as clear Check CT wo contrast ( allergic to IV dye), if no acute findings to explain current clinical picture will check V/Q scan With recent significant GI bleed will avoid empiric AC  
-con coreg 
-holding ace to allow for aggressive diureses  
-O2 to keep lizeth >90%, wean as tolerated back to home 2.5 L  
-consulting primary cardiology to help with management -ECHO Recent lower GI bleed 10/2018 
- likely was due to diverticulosis 
-Hb stable today. ASA on hold since last admission . Restart if h/h stable Remote hx PUD, continue PPI 
L thyroid mass, chronic problem since childhood Hx frequent uti, continue daily cipro prophylaxis Hyperlipidemia, cont statin COPD wo exacerbation, cont Pulmicort, jet nebs prn Code Status: Full code d/w pt Surrogate Decision Maker: pt has 7 children; pt wants her 2 dtrs Galileo Carlisle and Vika to be her surrogate decision makers if needed DVT Prophylaxis: holding lovenox, if h/h remain stable may consider to start GI Prophylaxis: not indicated Baseline: 2 sons live with her; WC bound Subjective: CHIEF COMPLAINT: SOB / LE edema HISTORY OF PRESENT ILLNESS:    
Froylan Carr is a 80 y.o.  female who presents with above complaint. Pt is very poor historian. She admits to feeling dyspneic with ambulation but cannot give details. She stated \" I know why I am SOB because I gained weight\". Pt denies cough/fever/chills/CP. History was obtained mostly from dtr at bedside. Per dtr, pt started with worsening LE edema and VILLAR ~ 2 weeks ago. Sx was building up slowly. Today pt was noted to be SOB even when talking. Today they tried to be seen in PCP office but was refer to Dr Arnulfo Talavera. They called cardiology office and advised to be seen in ED. Pt is on Lasix 80 mg BID at home and stated she is compliant. Pt stated she feels that she gained weight. Pt is chronically on 2.5 L of O2 but O2 was increased to 4 L today due to SOB. We were asked to admit for work up and evaluation of the above problems. Past Medical History:  
Diagnosis Date  Asthma  Autoimmune disease ([address] lupus  CAD (coronary artery disease)   
 cardiac cath  CAD (coronary artery disease) sick sinus syndrome  Essential hypertension  GERD (gastroesophageal reflux disease)  Glaucoma  Heart failure ([address] Lupus  Hypertension  Other ill-defined conditions(799.89)  Pacemaker  PUD (peptic ulcer disease)  S/P implantation of automatic cardioverter/defibrillator (AICD) 1/14/2015 [address] upgrade to AICD implant  SSS (sick sinus syndrome) ([address] Past Surgical History:  
Procedure Laterality Date  CARDIAC SURG PROCEDURE UNLIST    
 pacemaker/defibrilator.   
 HX GYN    
 BTL  
 HX PACEMAKER    
  TN COLONOSCOPY W/BIOPSY SINGLE/MULTIPLE  3/26/2012  TN COLSC FLX W/RMVL OF TUMOR POLYP LESION SNARE TQ  3/26/2012 Social History Tobacco Use  Smoking status: Never Smoker  Smokeless tobacco: Never Used Substance Use Topics  Alcohol use: No  
  Alcohol/week: 0.0 oz Family History Problem Relation Age of Onset  Arrhythmia Mother  Hypertension Mother  Hypertension Father Allergies Allergen Reactions  Contrast Agent [Iodine] Anaphylaxis Made aware of allergy 1/4/13  Sulfa (Sulfonamide Antibiotics) Hives  Codeine Anxiety  Pcn [Penicillins] Anaphylaxis Reaction was to penicillin injections into buttocks. She can take oral amoxicillin Prior to Admission medications Medication Sig Start Date End Date Taking? Authorizing Provider  
furosemide (LASIX) 80 mg tablet TAKE 1 TABLET BY MOUTH TWICE DAILY 10/25/18   Lg Thomas MD  
potassium chloride SR (KLOR-CON 10) 10 mEq tablet Take 10 mEq by mouth daily. Patient takes 10 mEq daily and 20 mEq QHS    Cash Pop MD  
potassium chloride SR (KLOR-CON 10) 10 mEq tablet Take 20 mEq by mouth nightly. Patient takes 10 mEq daily and 20 mEq QHS    Cash Pop MD  
albuterol (PROVENTIL HFA, VENTOLIN HFA, PROAIR HFA) 90 mcg/actuation inhaler Take 1-2 Puffs by inhalation every four (4) hours as needed for Wheezing or Shortness of Breath. Cash Pop MD  
Psyllium Husk-Sucrose (FIBER, PSYLLIUM HUSK/SUGAR,) 3.4 gram/11 gram powd Take 1 Cap by mouth daily. Cash Pop MD  
mv-mn/folic acid/calcium/vit K (ONE-A-DAY WOMEN'S 50 PLUS PO) Take 1 Tab by mouth daily.     Cash Pop MD  
nitroglycerin (NITROSTAT) 0.4 mg SL tablet 1 Tab by SubLINGual route every five (5) minutes as needed for Chest Pain (Call 911 if not relieved by 3 tablets). 3/21/18   Maryjo Durant MD  
carvedilol (COREG) 25 mg tablet TAKE 1 TABLET BY MOUTH TWICE DAILY WITH MEALS 8/25/17   Axel Terrazas NP  
 atorvastatin (LIPITOR) 40 mg tablet TAKE 1 TABLET BY MOUTH DAILY 7/28/17   Saint Keregine TAYLOR NP  
lisinopril (PRINIVIL, ZESTRIL) 10 mg tablet TAKE 1 TABLET BY MOUTH DAILY 4/11/15   Benjamin Bates MD  
NEXIUM 40 mg capsule TAKE ONE CAPSULE BY MOUTH DAILY 2/28/15   Benjamin Bates MD  
budesonide (PULMICORT) 180 mcg/actuation aepb inhaler Take 2 puffs by inhalation two (2) times a day. Provider, Historical  
ciprofloxacin (CIPRO) 250 mg tablet Take 125 mg by mouth daily. for UTI prophylaxis    Provider, Historical  
acetaminophen (TYLENOL ARTHRITIS PAIN) 650 mg CR tablet Take 650 mg by mouth every six (6) hours as needed for Pain. Provider, Historical  
latanoprost (XALATAN) 0.005 % ophthalmic solution Administer 1 Drop to both eyes nightly. Other, MD Cash  
cetirizine (ZYRTEC) 10 mg tablet Take 10 mg by mouth daily. Other, MD Cash  
 
 
REVIEW OF SYSTEMS:    
I am not able to complete the review of systems because: The patient is intubated and sedated The patient has altered mental status due to his acute medical problems The patient has baseline aphasia from prior stroke(s) The patient has baseline dementia and is not reliable historian The patient is in acute medical distress and unable to provide information Total of 12 systems reviewed as follows:   
   POSITIVE= underlined text  Negative = text not underlined General:  fever, chills, sweats, generalized weakness, weight loss/gain,  
   loss of appetite Eyes:    blurred vision, eye pain, loss of vision, double vision ENT:    rhinorrhea, pharyngitis Respiratory:   cough, sputum production, SOB, VILLAR, wheezing, pleuritic pain  
Cardiology:   chest pain, palpitations, orthopnea, PND, edema, syncope Gastrointestinal:  abdominal pain , N/V, diarrhea, dysphagia, constipation, bleeding Genitourinary:  frequency, urgency, dysuria, hematuria, incontinence Muskuloskeletal :  arthralgia, myalgia, back pain Hematology:  easy bruising, nose or gum bleeding, lymphadenopathy Dermatological: rash, ulceration, pruritis, color change / jaundice Endocrine:   hot flashes or polydipsia Neurological:  headache, dizziness, confusion, focal weakness, paresthesia, Speech difficulties, memory loss, gait difficulty Psychological: Feelings of anxiety, depression, agitation Objective: VITALS:   
Visit Vitals /72 (BP 1 Location: Left arm, BP Patient Position: At rest) Pulse 84 Temp 98.2 °F (36.8 °C) Resp 18 Ht 5' 1\" (1.549 m) Wt 90.7 kg (200 lb) SpO2 98% BMI 37.79 kg/m² PHYSICAL EXAM: 
 
General:    Alert, cooperative, no distress, appears stated age. Mild dyspnea with conversation HEENT: Atraumatic, anicteric sclerae, pink conjunctivae No oral ulcers, mucosa moist, throat clear, dentition fair Neck:  Supple, symmetrical,  thyroid: non tender Lungs:   Diminished BS bases bilaterally. No Wheezing or Rhonchi. No rales. Chest wall:  No tenderness  No Accessory muscle use. Heart:   Regular  rhythm,  No  murmur   ++ edema Abdomen:   Soft, non-tender. Not distended. Bowel sounds normal 
Extremities: No cyanosis. No clubbing,   
  Skin turgor normal, Capillary refill normal, Radial dial pulse 2+ Skin:     Not pale. Not Jaundiced  No rashes Psych:  Good insight. Not depressed. Not anxious or agitated. Neurologic: EOMs intact. No facial asymmetry. No aphasia or slurred speech. Symmetrical strength, Sensation grossly intact. Alert and oriented X 4.  
 
_______________________________________________________________________ Care Plan discussed with: 
  Comments Patient y Family  y dtr bedside RN y   
Care Manager Consultant:  cleemnt ED provider   
_______________________________________________________________________ Expected  Disposition:  
Home with Family HH/PT/OT/RN y  
SNF/LTC y  
Claude Menendez   
 ________________________________________________________________________ TOTAL TIME:  65  Minutes Critical Care Provided     Minutes non procedure based Comments Reviewed previous records  
>50% of visit spent in counseling and coordination of care  Discussion with patient and/or family and questions answered 
  
 
________________________________________________________________________ Signed: Chase Glaser MD 
 
Procedures: see electronic medical records for all procedures/Xrays and details which were not copied into this note but were reviewed prior to creation of Plan. LAB DATA REVIEWED:   
Recent Results (from the past 24 hour(s)) EKG, 12 LEAD, INITIAL Collection Time: 12/07/18 12:35 PM  
Result Value Ref Range Ventricular Rate 87 BPM  
 Atrial Rate 87 BPM  
 P-R Interval 164 ms QRS Duration 120 ms  
 Q-T Interval 398 ms QTC Calculation (Bezet) 478 ms Calculated P Axis 33 degrees Calculated R Axis -46 degrees Calculated T Axis 78 degrees Diagnosis Sinus rhythm with occasional premature ventricular complexes Left anterior fascicular block Left ventricular hypertrophy with QRS widening Confirmed by Presley Muller ([id number]) on 12/7/2018 4:41:58 PM 
  
URINALYSIS W/ REFLEX CULTURE Collection Time: 12/07/18 12:37 PM  
Result Value Ref Range Color YELLOW/STRAW Appearance CLEAR CLEAR Specific gravity 1.009 1.003 - 1.030    
 pH (UA) 7.0 5.0 - 8.0 Protein NEGATIVE  NEG mg/dL Glucose NEGATIVE  NEG mg/dL Ketone NEGATIVE  NEG mg/dL Bilirubin NEGATIVE  NEG Blood SMALL (A) NEG Urobilinogen 1.0 0.2 - 1.0 EU/dL Nitrites NEGATIVE  NEG Leukocyte Esterase SMALL (A) NEG    
 WBC 10-20 0 - 4 /hpf  
 RBC 5-10 0 - 5 /hpf Epithelial cells FEW FEW /lpf Bacteria NEGATIVE  NEG /hpf  
 UA:UC IF INDICATED URINE CULTURE ORDERED (A) CNI  Hyaline cast 2-5 0 - 5 /lpf  
CBC WITH AUTOMATED DIFF  
 Collection Time: 12/07/18  1:29 PM  
Result Value Ref Range WBC 6.9 3.6 - 11.0 K/uL  
 RBC 3.89 3.80 - 5.20 M/uL  
 HGB 10.3 (L) 11.5 - 16.0 g/dL HCT 36.0 35.0 - 47.0 % MCV 92.5 80.0 - 99.0 FL  
 MCH 26.5 26.0 - 34.0 PG  
 MCHC 28.6 (L) 30.0 - 36.5 g/dL  
 RDW 14.1 11.5 - 14.5 % PLATELET 619 330 - 694 K/uL MPV 9.3 8.9 - 12.9 FL  
 NRBC 0.0 0  WBC ABSOLUTE NRBC 0.00 0.00 - 0.01 K/uL NEUTROPHILS 73 32 - 75 % LYMPHOCYTES 14 12 - 49 % MONOCYTES 11 5 - 13 % EOSINOPHILS 2 0 - 7 % BASOPHILS 0 0 - 1 % IMMATURE GRANULOCYTES 0 0.0 - 0.5 % ABS. NEUTROPHILS 5.0 1.8 - 8.0 K/UL  
 ABS. LYMPHOCYTES 1.0 0.8 - 3.5 K/UL  
 ABS. MONOCYTES 0.8 0.0 - 1.0 K/UL  
 ABS. EOSINOPHILS 0.1 0.0 - 0.4 K/UL  
 ABS. BASOPHILS 0.0 0.0 - 0.1 K/UL  
 ABS. IMM. GRANS. 0.0 0.00 - 0.04 K/UL  
 DF AUTOMATED    
 RBC COMMENTS NORMOCYTIC, NORMOCHROMIC METABOLIC PANEL, COMPREHENSIVE Collection Time: 12/07/18  1:29 PM  
Result Value Ref Range Sodium 141 136 - 145 mmol/L Potassium 3.9 3.5 - 5.1 mmol/L Chloride 99 97 - 108 mmol/L  
 CO2 37 (H) 21 - 32 mmol/L Anion gap 5 5 - 15 mmol/L Glucose 93 65 - 100 mg/dL BUN 17 6 - 20 MG/DL Creatinine 1.04 (H) 0.55 - 1.02 MG/DL  
 BUN/Creatinine ratio 16 12 - 20 GFR est AA >60 >60 ml/min/1.73m2 GFR est non-AA 50 (L) >60 ml/min/1.73m2 Calcium 8.8 8.5 - 10.1 MG/DL Bilirubin, total 0.9 0.2 - 1.0 MG/DL  
 ALT (SGPT) 22 12 - 78 U/L  
 AST (SGOT) 22 15 - 37 U/L Alk. phosphatase 89 45 - 117 U/L Protein, total 8.6 (H) 6.4 - 8.2 g/dL Albumin 3.9 3.5 - 5.0 g/dL Globulin 4.7 (H) 2.0 - 4.0 g/dL A-G Ratio 0.8 (L) 1.1 - 2.2 NT-PRO BNP Collection Time: 12/07/18  1:29 PM  
Result Value Ref Range NT pro-BNP 3,384 (H) 0 - 450 PG/ML  
TROPONIN I Collection Time: 12/07/18  1:29 PM  
Result Value Ref Range Troponin-I, Qt. <0.05 <0.05 ng/mL CK W/ CKMB & INDEX Collection Time: 12/07/18  1:29 PM  
Result Value Ref Range CK 63 26 - 192 U/L  
 CK - MB <1.0 <3.6 NG/ML  
 CK-MB Index Cannot be calculated 0 - 2.5 BLOOD GAS, ARTERIAL Collection Time: 12/07/18  3:21 PM  
Result Value Ref Range pH 7.41 7.35 - 7.45    
 PCO2 60 (H) 35.0 - 45.0 mmHg PO2 91 80 - 100 mmHg O2 SAT 97 92 - 97 % BICARBONATE 37 (H) 22 - 26 mmol/L  
 BASE EXCESS 9.7 mmol/L  
 O2 METHOD NASAL O2    
 O2 FLOW RATE 2.50 L/min SPONTANEOUS RATE 18.0 Sample source ARTERIAL    
 SITE RIGHT BRACHIAL SOM'S TEST N/A    
PTT Collection Time: 12/07/18  5:19 PM  
Result Value Ref Range aPTT 30.2 22.1 - 32.0 sec  
 aPTT, therapeutic range     58.0 - 77.0 SECS  
PROTHROMBIN TIME + INR Collection Time: 12/07/18  5:19 PM  
Result Value Ref Range INR 1.2 (H) 0.9 - 1.1 Prothrombin time 11.8 (H) 9.0 - 11.1 sec

## 2018-12-08 ENCOUNTER — APPOINTMENT (OUTPATIENT)
Dept: NUCLEAR MEDICINE | Age: 83
DRG: 293 | End: 2018-12-08
Attending: HOSPITALIST
Payer: MEDICARE

## 2018-12-08 LAB
ANION GAP SERPL CALC-SCNC: 3 MMOL/L (ref 5–15)
BUN SERPL-MCNC: 16 MG/DL (ref 6–20)
BUN/CREAT SERPL: 16 (ref 12–20)
CALCIUM SERPL-MCNC: 8.7 MG/DL (ref 8.5–10.1)
CHLORIDE SERPL-SCNC: 99 MMOL/L (ref 97–108)
CO2 SERPL-SCNC: 37 MMOL/L (ref 21–32)
CREAT SERPL-MCNC: 1.03 MG/DL (ref 0.55–1.02)
D DIMER PPP FEU-MCNC: 2.9 MG/L FEU (ref 0–0.65)
ERYTHROCYTE [DISTWIDTH] IN BLOOD BY AUTOMATED COUNT: 14 % (ref 11.5–14.5)
GLUCOSE SERPL-MCNC: 121 MG/DL (ref 65–100)
HCT VFR BLD AUTO: 33.2 % (ref 35–47)
HGB BLD-MCNC: 9.6 G/DL (ref 11.5–16)
MAGNESIUM SERPL-MCNC: 2.4 MG/DL (ref 1.6–2.4)
MCH RBC QN AUTO: 26.7 PG (ref 26–34)
MCHC RBC AUTO-ENTMCNC: 28.9 G/DL (ref 30–36.5)
MCV RBC AUTO: 92.5 FL (ref 80–99)
NRBC # BLD: 0 K/UL (ref 0–0.01)
NRBC BLD-RTO: 0 PER 100 WBC
PHOSPHATE SERPL-MCNC: 3.4 MG/DL (ref 2.6–4.7)
PLATELET # BLD AUTO: 272 K/UL (ref 150–400)
PMV BLD AUTO: 9.5 FL (ref 8.9–12.9)
POTASSIUM SERPL-SCNC: 3.6 MMOL/L (ref 3.5–5.1)
RBC # BLD AUTO: 3.59 M/UL (ref 3.8–5.2)
SODIUM SERPL-SCNC: 139 MMOL/L (ref 136–145)
WBC # BLD AUTO: 7.9 K/UL (ref 3.6–11)

## 2018-12-08 PROCEDURE — 85379 FIBRIN DEGRADATION QUANT: CPT

## 2018-12-08 PROCEDURE — 97162 PT EVAL MOD COMPLEX 30 MIN: CPT

## 2018-12-08 PROCEDURE — 74011250637 HC RX REV CODE- 250/637: Performed by: INTERNAL MEDICINE

## 2018-12-08 PROCEDURE — 36415 COLL VENOUS BLD VENIPUNCTURE: CPT

## 2018-12-08 PROCEDURE — 80048 BASIC METABOLIC PNL TOTAL CA: CPT

## 2018-12-08 PROCEDURE — 83735 ASSAY OF MAGNESIUM: CPT

## 2018-12-08 PROCEDURE — 77010033678 HC OXYGEN DAILY

## 2018-12-08 PROCEDURE — 74011250637 HC RX REV CODE- 250/637: Performed by: HOSPITALIST

## 2018-12-08 PROCEDURE — 94760 N-INVAS EAR/PLS OXIMETRY 1: CPT

## 2018-12-08 PROCEDURE — 85027 COMPLETE CBC AUTOMATED: CPT

## 2018-12-08 PROCEDURE — 65660000000 HC RM CCU STEPDOWN

## 2018-12-08 PROCEDURE — 74011250636 HC RX REV CODE- 250/636: Performed by: HOSPITALIST

## 2018-12-08 PROCEDURE — G8979 MOBILITY GOAL STATUS: HCPCS

## 2018-12-08 PROCEDURE — G8978 MOBILITY CURRENT STATUS: HCPCS

## 2018-12-08 PROCEDURE — 97116 GAIT TRAINING THERAPY: CPT

## 2018-12-08 PROCEDURE — 84100 ASSAY OF PHOSPHORUS: CPT

## 2018-12-08 PROCEDURE — 74011250636 HC RX REV CODE- 250/636: Performed by: INTERNAL MEDICINE

## 2018-12-08 RX ORDER — ENOXAPARIN SODIUM 100 MG/ML
40 INJECTION SUBCUTANEOUS EVERY 24 HOURS
Status: DISCONTINUED | OUTPATIENT
Start: 2018-12-08 | End: 2018-12-10 | Stop reason: HOSPADM

## 2018-12-08 RX ORDER — POTASSIUM CHLORIDE 750 MG/1
10 TABLET, FILM COATED, EXTENDED RELEASE ORAL DAILY
Status: DISCONTINUED | OUTPATIENT
Start: 2018-12-08 | End: 2018-12-10 | Stop reason: HOSPADM

## 2018-12-08 RX ADMIN — CARVEDILOL 25 MG: 12.5 TABLET, FILM COATED ORAL at 18:14

## 2018-12-08 RX ADMIN — FUROSEMIDE 80 MG: 10 INJECTION, SOLUTION INTRAMUSCULAR; INTRAVENOUS at 21:12

## 2018-12-08 RX ADMIN — Medication 10 ML: at 21:12

## 2018-12-08 RX ADMIN — FLUTICASONE FUROATE 1 PUFF: 200 POWDER RESPIRATORY (INHALATION) at 13:12

## 2018-12-08 RX ADMIN — LATANOPROST 1 DROP: 50 SOLUTION OPHTHALMIC at 21:12

## 2018-12-08 RX ADMIN — Medication 10 ML: at 13:13

## 2018-12-08 RX ADMIN — CARVEDILOL 25 MG: 12.5 TABLET, FILM COATED ORAL at 10:08

## 2018-12-08 RX ADMIN — ENOXAPARIN SODIUM 40 MG: 40 INJECTION SUBCUTANEOUS at 10:18

## 2018-12-08 RX ADMIN — FUROSEMIDE 80 MG: 10 INJECTION, SOLUTION INTRAMUSCULAR; INTRAVENOUS at 10:08

## 2018-12-08 RX ADMIN — Medication 10 ML: at 06:18

## 2018-12-08 RX ADMIN — POTASSIUM CHLORIDE 10 MEQ: 750 TABLET, EXTENDED RELEASE ORAL at 10:18

## 2018-12-08 RX ADMIN — ATORVASTATIN CALCIUM 40 MG: 40 TABLET, FILM COATED ORAL at 10:08

## 2018-12-08 NOTE — PROGRESS NOTES
Hospitalist Progress Note NAME: Elena Li :  1934 MRN:  [id number] Assessment / Plan: 
Acute on chronic hypoxic respiratory failure POA- currently stable on 2-3 L/m oxygen as at baseline, but unable to lay flat for any testing- failed to get V/Q scan this AM 
due to acute on chronic systolic & diastolic CHF POA- weight gain, dietary salt indiscretion per pt 
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  
HTN 
-BP/HR stable 
+ VILLAR/worsening LE edema/ weight gain subjectively 
cxray reported as clear CT wo contrast - neg for acute ASD in lungs Taper oxygen to keep at 2L/m as at home/baseline Cont IV diureses with lasix at home dose- 80mg BID Close monitoring daily weight /cr/K Agree Clinically low suspicious for PE (low pre-test probability)- will check DDimer as pt has failed to lay flat to ronnie V/Q scan & cannot get CT chest with Contrast (due to contrast allergy) With recent significant GI bleed will avoid empiric [facility] Cont coreg Keep holding ACE- allow for aggressive diureses IP Consult to primary cardiology to help with management Check 2DECHO when able 
  
Recent lower GI bleed 10/2018 
- likely was due to diverticulosis 
-Hb stable today. ASA on hold since last admission . Restart if h/h stable - seems to be stable now- deferred to Cardiology to resume ASA 
  
Remote hx PUD, continue PPI 
L thyroid mass, chronic problem since childhood Hx frequent uti, continue daily cipro prophylaxis Hyperlipidemia, cont statin COPD wo exacerbation, cont Pulmicort, jet nebs prn  
  
  
  
  
Code Status: Full code d/w pt Surrogate Decision Maker: pt has 7 children; pt wants her 2 dtrs Umu Muñoz and Vika to be her surrogate decision makers if needed  
  
DVT Prophylaxis: start lovenox SQ today GI Prophylaxis: not indicated 
  
Baseline: 2 sons live with her; WC bound 30.0 - 39.9 Obese / Body mass index is 38.96 kg/m². Weight loss recommended - diet, exercise Recommended Disposition: Home w/Family, SNF/LTC and  PT, OT, RN?? TBD Subjective: Chief Complaint / Reason for Physician Visit: F/U CHF exacerbation, acute/chronic resp failure , Weight gain \"I am ok but can't lay flat\". Discussed with RN events overnight. Review of Systems: 
Symptom Y/N Comments  Symptom Y/N Comments Fever/Chills n   Chest Pain n   
Poor Appetite n   Edema n   
Cough n   Abdominal Pain n   
Sputum n   Joint Pain SOB/VILLAR y   Pruritis/Rash Nausea/vomit    Tolerating PT/OT y Diarrhea    Tolerating Diet y Constipation    Other Could NOT obtain due to:   
 
Objective: VITALS:  
Last 24hrs VS reviewed since prior progress note. Most recent are: 
Patient Vitals for the past 24 hrs: 
 Temp Pulse Resp BP SpO2  
12/08/18 0712 98 °F (36.7 °C) 80 22 139/83 97 % 12/08/18 0355 98.4 °F (36.9 °C) 82 24 147/79 96 % 12/07/18 2313 98.6 °F (37 °C) 81 24 138/84 92 % 12/07/18 2103 98.6 °F (37 °C) 80 24 145/76 98 % 12/07/18 1800  94 30 165/84 98 % 12/07/18 1700  84 18 146/65 100 % 12/07/18 1217 98.2 °F (36.8 °C) 84 18 129/72 98 % Intake/Output Summary (Last 24 hours) at 12/8/2018 7469 Last data filed at 12/8/2018 0221 Gross per 24 hour Intake  Output 850 ml Net -850 ml PHYSICAL EXAM: 
General: WD, WN. Alert, cooperative, no acute distress, Obese +   
EENT:  EOMI. Anicteric sclerae. MMM Resp:  Distant BS bilaterally, not clear wheezing or Crackles noted +  No accessory muscle use CV:  Regular  rhythm,  2+ LE pitting edema noted + GI:  Soft, Non distended, Non tender.  +Bowel sounds Neurologic:  Alert and oriented X 3, normal speech, Psych:   Good insight. Not anxious nor agitated Skin:  No rashes. No jaundice Reviewed most current lab test results and cultures  YES Reviewed most current radiology test results   YES Review and summation of old records today    NO Reviewed patient's current orders and MAR    YES 
 PMH/SH reviewed - no change compared to H&P 
________________________________________________________________________ Care Plan discussed with: 
  Comments Patient x Family RN x Care Manager Consultant Multidiciplinary team rounds were held today with , nursing, pharmacist and clinical coordinator. Patient's plan of care was discussed; medications were reviewed and discharge planning was addressed. ________________________________________________________________________ Total NON critical care TIME:  36   Minutes Total CRITICAL CARE TIME Spent:   Minutes non procedure based Comments >50% of visit spent in counseling and coordination of care    
________________________________________________________________________ Ivon Huff MD  
 
Procedures: see electronic medical records for all procedures/Xrays and details which were not copied into this note but were reviewed prior to creation of Plan. LABS: 
I reviewed today's most current labs and imaging studies. Pertinent labs include: 
Recent Labs 12/08/18 
0000 12/07/18 
1329 WBC 7.9 6.9 HGB 9.6* 10.3* HCT 33.2* 36.0  289 Recent Labs 12/08/18 
0000 12/07/18 
1719 12/07/18 
1329   --  141  
K 3.6  --  3.9 CL 99  --  99  
CO2 37*  --  37* *  --  93 BUN 16  --  17  
CREA 1.03*  --  1.04* CA 8.7  --  8.8 MG 2.4  --   --   
PHOS 3.4  --   --   
ALB  --   --  3.9 TBILI  --   --  0.9 SGOT  --   --  22 ALT  --   --  22 INR  --  1.2*  --   
 
 
Signed: Ivon Huff MD

## 2018-12-08 NOTE — ROUTINE PROCESS
TRANSFER - OUT REPORT: 
 
Verbal report given to [facility], [address] on [address] Si  being transferred to [phone] for routine progression of care Report consisted of patients Situation, Background, Assessment and  
Recommendations(SBAR). Information from the following report(s) SBAR, Kardex and ED Summary was reviewed with the receiving nurse. Opportunity for questions and clarification was provided. Patient transported with: 
 O2 @ 3 liters Tech

## 2018-12-08 NOTE — ED NOTES
Care assumed of patient @ this time & intro with rounding done, with report from [facility], RN's__________________. Patient resting quietly on stretcher without verbal complaints.

## 2018-12-08 NOTE — PROGRESS NOTES
Report received from [facility]. Introduced myself as primary RN. Assumed care of patient. Instructed patient to call for assistance prior to getting up. Pt verbalized understanding. Call bell within reach.  
 
7:39 AM Cardiology consult called in. Spoke with [facility]. Dr. Baldev Durán on call. 8:46 AM Patient off fl for testing. 9:20 AM Pt back on fl. Informed by technologist patient unable to tolerate VQ scan as she could not lie flat. MD aware and study cancelled. 10:34 AM D. Dimer drawn and sent to lab. Bedside and Verbal shift change report given to Nishi Lopez (oncoming nurse) by Jenna Montes (offgoing nurse). Report included the following information SBAR, Kardex, MAR, Med Rec Status and Cardiac Rhythm NSR.

## 2018-12-08 NOTE — CONSULTS
[facility] [address], SISTER [facility], [address] [facility]  [phone] Cardiology Progress Note 
 
 
12/8/2018 2:21 PM 
 
Admit Date: 12/7/2018 Admit Diagnosis:  
Heart failure ([address] Subjective:  
 
Khoa Steele Presents with increased edema and SOB. Denies CP. Known chronic systolic HF. Admits to some dietary noncompliance. Visit Vitals BP (!) 137/106 (BP 1 Location: Left arm, BP Patient Position: Head of bed elevated (Comment degrees)) Comment (BP Patient Position): 80  
Pulse 80 Temp 98.1 °F (36.7 °C) Resp 22 Ht 5' 1\" (1.549 m) Wt 206 lb 3.2 oz (93.5 kg) SpO2 93% BMI 38.96 kg/m² Current Facility-Administered Medications Medication Dose Route Frequency  xenon xe 526 gas 10 millicurie  10 millicurie Inhalation RAD ONCE  
 technetium albumin aggregated (MAA) solution 6 millicurie  6 millicurie IntraVENous RAD ONCE  potassium chloride SR (KLOR-CON 10) tablet 10 mEq  10 mEq Oral DAILY  enoxaparin (LOVENOX) injection 40 mg  40 mg SubCUTAneous Q24H  
 furosemide (LASIX) injection 80 mg  80 mg IntraVENous Q12H  
 atorvastatin (LIPITOR) tablet 40 mg  40 mg Oral DAILY  albuterol-ipratropium (DUO-NEB) 2.5 MG-0.5 MG/3 ML  3 mL Nebulization Q4H PRN  
 carvedilol (COREG) tablet 25 mg  25 mg Oral BID WITH MEALS  latanoprost (XALATAN) 0.005 % ophthalmic solution 1 Drop  1 Drop Both Eyes QHS  pantoprazole (PROTONIX) tablet 40 mg  40 mg Oral DAILY PRN  
 nitroglycerin (NITROSTAT) tablet 0.4 mg  0.4 mg SubLINGual Q5MIN PRN  
 sodium chloride (NS) flush 5-10 mL  5-10 mL IntraVENous Q8H  
 sodium chloride (NS) flush 5-10 mL  5-10 mL IntraVENous PRN  
 acetaminophen (TYLENOL) tablet 650 mg  650 mg Oral Q4H PRN  
 naloxone (NARCAN) injection 0.4 mg  0.4 mg IntraVENous PRN  
 ondansetron (ZOFRAN) injection 4 mg  4 mg IntraVENous Q4H PRN  
 fluticasone furoate (ARNUITY ELLIPTA) 200 mcg/puff  1 Puff Inhalation DAILY Facility-Administered Medications Ordered in Other Encounters Medication Dose Route Frequency  ADDaptor  vancomycin (VANCOCIN) 1,000 mg injection  0.9% sodium chloride (MBP/ADV) 0.9 % infusion  bacitracin 50,000 unit injection Objective:  
  
Physical Exam: 
General Appearance:   
Chest:   Clear Cardiovascular: RRR Extremities: 2+ edema Skin:  Warm and dry.  
 
Data Review:  
Recent Labs 12/08/18 
0000 12/07/18 
1329 WBC 7.9 6.9 HGB 9.6* 10.3* HCT 33.2* 36.0  289 Recent Labs 12/08/18 
0000 12/07/18 
1719 12/07/18 
1329   --  141  
K 3.6  --  3.9 CL 99  --  99  
CO2 37*  --  37* *  --  93 BUN 16  --  17  
CREA 1.03*  --  1.04* CA 8.7  --  8.8 MG 2.4  --   --   
PHOS 3.4  --   --   
ALB  --   --  3.9 TBILI  --   --  0.9 SGOT  --   --  22 ALT  --   --  22 INR  --  1.2*  --   
 
 
Recent Labs 12/07/18 
1329 TROIQ <0.05  
CPK 63 CKMB <1.0 Intake/Output Summary (Last 24 hours) at 12/8/2018 1422 Last data filed at 12/8/2018 1315 Gross per 24 hour Intake  Output 1250 ml Net -1250 ml Telemetry:  
EKG:  NSR, LAHB Cxray: 
 
Assessment:  
 
Principal Problem: 
  Systolic and diastolic CHF, acute on chronic ([address] (9/3/2014) Active Problems: 
  Chronic systolic heart failure ([address] (3/8/2012) Chronic diastolic heart failure ([address] (3/8/2012) Moderate to severe pulmonary hypertension ([address] (6/12/2013) Acute-on-chronic respiratory failure ([address] (8/27/2014) S/P implantation of automatic cardioverter/defibrillator (AICD) (1/14/2015) Overview: [employer] upgrade to AICD implant Plan:  
 
Subacute exacerbation of CHF. Etio unclear. Responding to Rx. Continue diuresis.  
 
Prasanna Jalloh M.D., [facility]

## 2018-12-08 NOTE — PROGRESS NOTES
TRANSFER - IN REPORT: 
 
Verbal report received from [address] [place] [address], RN(name) on Colleen Solomon  being received from ED(unit) for routine progression of care Report consisted of patients Situation, Background, Assessment and  
Recommendations(SBAR). Information from the following report(s) SBAR, Kardex, ED Summary and Cardiac Rhythm (NSR) was reviewed with the receiving nurse. Opportunity for questions and clarification was provided. Assessment completed upon patients arrival to unit and care assumed.

## 2018-12-08 NOTE — PROGRESS NOTES
Bedside and Verbal shift change report given to Lyndsey Shah RN (oncoming nurse). Report included the following information SBAR, Kardex, ED Summary, Procedure Summary, Intake/Output, MAR, Recent Results and Cardiac Rhythm (NSR). SHIFT SUMMARY: 
 
 
 
 
 
[address] NURSING NOTE Admission Date 12/7/2018 Admission Diagnosis Heart failure ([address] Consults IP CONSULT TO CARDIOLOGY Cardiac Monitoring [x] Yes [] No  
  
Purposeful Hourly Rounding [x] Yes   
Idalmis Score Total Score: 3 Idalmis score 3 or > [x] Bed Alarm [] Avasys [] 1:1 sitter [] Patient refused (Signed refusal form in chart) Farshad Score Farshad Score: 17 Farshad score 14 or < [] PMT consult [] Wound Care consult  
 []  Specialty bed  [] Nutrition consult Influenza Vaccine Received Flu Vaccine for Current Season (usually Sept-March): Yes Oxygen needs? [] Room air Oxygen @  []1L    [x]2L    []3L   []4L    []5L   []6L via NC Chronic home O2 use? [x] Yes [] No 
Perform O2 challenge test and document in progress note using [email or web address] (.Homeoxygen) Last bowel movement Last Bowel Movement Date: 12/07/18(per pt) Urinary Catheter LDAs Peripheral IV 12/07/18 Left Antecubital (Active) Site Assessment Clean, dry, & intact 12/8/2018  2:21 AM  
Phlebitis Assessment 0 12/8/2018  2:21 AM  
Infiltration Assessment 0 12/8/2018  2:21 AM  
Dressing Status Clean, dry, & intact 12/8/2018  2:21 AM  
Dressing Type Transparent 12/8/2018  2:21 AM  
Hub Color/Line Status Blue;Flushed 12/8/2018  2:21 AM  
                  
  
Readmission Risk Assessment Tool Score High Risk   
      
 22 Total Score 3 Has Seen PCP in Last 6 Months (Yes=3, No=0)  
 4 IP Visits Last 12 Months (1-3=4, 4=9, >4=11) 5 Pt. Coverage (Medicare=5 , Medicaid, or Self-Pay=4) 10 Charlson Comorbidity Score (Age + Comorbid Conditions) Criteria that do not apply: . Living with Significant Other. Assisted Living. LTAC. SNF. or  
Rehab Patient Length of Stay (>5 days = 3) Expected Length of Stay - - - Actual Length of Stay 1

## 2018-12-08 NOTE — PROGRESS NOTES
Problem: Mobility Impaired (Adult and Pediatric) Goal: *Acute Goals and Plan of Care (Insert Text) Physical Therapy Goals Initiated 12/8/2018 1. Patient will transfer from bed to chair and chair to bed with modified independence using the least restrictive device within 7 day(s). 2.  Patient will perform sit to stand with modified independence within 7 day(s). 3.  Patient will ambulate with modified independence for 50 feet with the least restrictive device within 7 day(s). physical Therapy EVALUATION Patient: Beatrice Persaud (72 y.o. female) Date: 12/8/2018 Primary Diagnosis: Heart failure ([facility] Precautions:  Bed Alarm, Fall ASSESSMENT : 
Based on the objective data described below, the patient presents with good strength in bilateral lower extremities, decreased standing balance, decreased activity tolerance and decreased mobility skills following admission yesterday for heart failure. She was lying in bed with daughters in room. Agreed to therapy eval and cleared by nursing for mobility. PTA - she is very sedentary and stays in her bedroom most of the time at home. She gets oob to a recliner and uses a BSC next to her bed. She lives with her 2 sons in a 1 story home with no steps to enter. Her family uses a wheelchair when the patient wants to leave her room or goes to a medical appointment. She was on O2 at 2.5 L/min and was 96% at rest in bed. Currently she was independent with supine to sit transfers, sba for sit to stand and bed to chair using a RW. Ambulation was limited to 15 feet to toilet with sba and RW. She was very sob and O2 sats measured 82%. Increased O2 to 5L/min and it returned to 95% after about 4 mins. She was able to void on the toilet. Ambulated another 15 feet to her bedside chair with RW and sba - O2 sats decreased to 87% while still on 5L/min. After about 3 mins sats increased to 98%. O2 was turned down to 2.5L/min. Reviewed fall precautions. Encouraged eating meals on edge of bed or in chair. She will call nursing when she wants to return to bed. She will benefit from continued PT to improve balance, activity tolerance and gait skills. Patient will benefit from skilled intervention to address the above impairments. Patients rehabilitation potential is considered to be Fair Factors which may influence rehabilitation potential include:  
[]         None noted 
[]         Mental ability/status []         Medical condition 
[]         Home/family situation and support systems 
[]         Safety awareness 
[]         Pain tolerance/management 
[]         Other: PLAN : 
Recommendations and Planned Interventions: 
[]           Bed Mobility Training             []    Neuromuscular Re-Education []           Transfer Training                   []    Orthotic/Prosthetic Training 
[]           Gait Training                         []    Modalities []           Therapeutic Exercises           []    Edema Management/Control 
[]           Therapeutic Activities            []    Patient and Family Training/Education 
[]           Other (comment): Frequency/Duration: Patient will be followed by physical therapy  3 times a week to address goals. Discharge Recommendations: None Further Equipment Recommendations for Discharge: None has all dme SUBJECTIVE:  
Patient stated I only get out of the bed in the afternoon.  OBJECTIVE DATA SUMMARY:  
HISTORY:   
Past Medical History:  
Diagnosis Date  Asthma  Autoimmune disease ([facility] lupus  CAD (coronary artery disease)   
 cardiac cath  CAD (coronary artery disease) sick sinus syndrome  Essential hypertension  GERD (gastroesophageal reflux disease)  Glaucoma  Heart failure ([facility] [address] Lupus  Hypertension  Other ill-defined conditions(666.50)  Pacemaker  PUD (peptic ulcer disease)  S/P implantation of automatic cardioverter/defibrillator (AICD) 1/14/2015 [employer] upgrade to AICD implant  SSS (sick sinus syndrome) ([facility] Past Surgical History:  
Procedure Laterality Date  CARDIAC SURG PROCEDURE UNLIST    
 pacemaker/defibrilator.  HX GYN    
 BTL  
 HX PACEMAKER    
 DC COLONOSCOPY W/BIOPSY SINGLE/MULTIPLE  3/26/2012  DC COLSC FLX W/RMVL OF TUMOR POLYP LESION SNARE TQ  3/26/2012 Prior Level of Function/Home Situation:  she is very sedentary and stays in her bedroom most of the time at home. She gets oob to a recliner and uses a BSC next to her bed. She lives with her 2 sons in a 1 story home with no steps to enter. Her family uses a wheelchair when the patient wants to leave her room or goes to a medical appointment. Personal factors and/or comorbidities impacting plan of care: patient has low goals for increasing activity, good home support, [facility]. Home Situation Home Environment: Private residence # Steps to Enter: 0 Wheelchair Ramp: No 
One/Two Story Residence: One story Living Alone: No 
Support Systems: Child(timmy) Patient Expects to be Discharged to[de-identified] Private residence Current DME Used/Available at Home: Cane, straight, Commode, bedside, Wheelchair, Walker, rolling Tub or Shower Type: Tub/Shower combination EXAMINATION/PRESENTATION/DECISION MAKING: Critical Behavior: 
Neurologic State: Alert Orientation Level: Oriented X4 Cognition: Appropriate decision making, Follows commands Safety/Judgement: Awareness of environment, Good awareness of safety precautions Hearing: Auditory Auditory Impairment: NoneSkin:   
Edema:  
Range Of Motion: 
AROM: Within functional limits PROM: Within functional limits Strength:   
Strength: Within functional limits Tone & Sensation:  
Tone: Normal 
  
  
  
  
Sensation: Intact Coordination: Coordination: Within functional limits Vision:  
  
Functional Mobility: 
Bed Mobility: 
Rolling: Independent Supine to Sit: Independent Scooting: Independent Transfers: 
Sit to Stand: Stand-by assistance Stand to Sit: Stand-by assistance Bed to Chair: Stand-by assistance Balance:  
Sitting: Intact Standing: Impaired Standing - Static: Good;Occassional 
Standing - Dynamic : FairAmbulation/Gait Training:Distance (ft): 30 Feet (ft)(15' x 2) Assistive Device: Walker, rolling;Gait belt Ambulation - Level of Assistance: Stand-by assistance Gait Description (WDL): Exceptions to [facility] Gait Abnormalities: Trunk sway increased Base of Support: Widened Speed/Kasie: Pace decreased (<100 feet/min) Functional Measure: 
Barthel Index: 
 
Bathin Bladder: 10 Bowels: 10 
Groomin Dressin Feeding: 10 Mobility: 0 Stairs: 0 Toilet Use: 10 Transfer (Bed to Chair and Back): 10 Total: 60 Barthel and G-code impairment scale: 
Percentage of impairment CH 
0% CI 
1-19% CJ 
20-39% CK 
40-59% CL 
60-79% CM 
80-99% CN 
100% Barthel Score 0-100 100 99-80 79-60 59-40 20-39 1-19 
 0 Barthel Score 0-20 20 17-19 13-16 9-12 5-8 1-4 0 The Barthel ADL Index: Guidelines 1. The index should be used as a record of what a patient does, not as a record of what a patient could do. 2. The main aim is to establish degree of independence from any help, physical or verbal, however minor and for whatever reason. 3. The need for supervision renders the patient not independent. 4. A patient's performance should be established using the best available evidence. Asking the patient, friends/relatives and nurses are the usual sources, but direct observation and common sense are also important. However direct testing is not needed. 5. Usually the patient's performance over the preceding 24-48 hours is important, but occasionally longer periods will be relevant. 6. Middle categories imply that the patient supplies over 50 per cent of the effort. 7. Use of aids to be independent is allowed. Marzella Mis., Barthel, D.W. (1530). Functional evaluation: the Barthel Index. [facility] (14)2. SULLY Farris, Zachary Trujillo., Jiarden Smith., Concepcion, [address] [facility] (1999). Measuring the change indisability after inpatient rehabilitation; comparison of the responsiveness of the Barthel Index and Functional [place] Measure. Journal of Neurology, Neurosurgery, and Psychiatry, 66(4), 207-078. Ny Mahmood, NETHAN.A, CHAPITO Gonzalez, & Becca Solo M.A. (2004.) Assessment of post-stroke quality of life in cost-effectiveness studies: The usefulness of the Barthel Index and the EuroQoL-5D. [facility], 13, 221-12 G codes: In compliance with CMSs Claims Based Outcome Reporting, the following G-code set was chosen for this patient based on their primary functional limitation being treated: The outcome measure chosen to determine the severity of the functional limitation was the Barthel with a score of 60/100 which was correlated with the impairment scale. ? Mobility - Walking and Moving Around:  
  - CURRENT STATUS: CJ - 20%-39% impaired, limited or restricted  - GOAL STATUS: CI - 1%-19% impaired, limited or restricted  - D/C STATUS:  ---------------To be determined--------------- Physical Therapy Evaluation Charge Determination History Examination Presentation Decision-Making HIGH Complexity :3+ comorbidities / personal factors will impact the outcome/ POC  HIGH Complexity : 4+ Standardized tests and measures addressing body structure, function, activity limitation and / or participation in recreation  MEDIUM Complexity : Evolving with changing characteristics  Other outcome measures Barthel  MEDIUM Based on the above components, the patient evaluation is determined to be of the following complexity level: MEDIUM 
 
 Pain: 
Pain Scale 1: Numeric (0 - 10) Pain Intensity 1: 0 Activity Tolerance:  
Poor Please refer to the flowsheet for vital signs taken during this treatment. After treatment:  
[x]         Patient left in no apparent distress sitting up in chair 
[]         Patient left in no apparent distress in bed 
[x]         Call bell left within reach [x]         Nursing notified 
[x]         Caregiver present [x]         Bed alarm activated COMMUNICATION/EDUCATION:  
The patients plan of care was discussed with: Registered Nurse. [x]         Fall prevention education was provided and the patient/caregiver indicated understanding. [x]         Patient/family have participated as able in goal setting and plan of care. [x]         Patient/family agree to work toward stated goals and plan of care. []         Patient understands intent and goals of therapy, but is neutral about his/her participation. []         Patient is unable to participate in goal setting and plan of care. Thank you for this referral. 
Kya Freed, PT Time Calculation: 29 mins

## 2018-12-09 ENCOUNTER — HOME HEALTH ADMISSION (OUTPATIENT)
Dept: HOME HEALTH SERVICES | Facility: HOME HEALTH | Age: 83
End: 2018-12-09

## 2018-12-09 LAB
ANION GAP SERPL CALC-SCNC: 3 MMOL/L (ref 5–15)
BACTERIA SPEC CULT: NORMAL
BUN SERPL-MCNC: 15 MG/DL (ref 6–20)
BUN/CREAT SERPL: 17 (ref 12–20)
CALCIUM SERPL-MCNC: 8.7 MG/DL (ref 8.5–10.1)
CC UR VC: NORMAL
CHLORIDE SERPL-SCNC: 99 MMOL/L (ref 97–108)
CO2 SERPL-SCNC: 37 MMOL/L (ref 21–32)
CREAT SERPL-MCNC: 0.9 MG/DL (ref 0.55–1.02)
GLUCOSE SERPL-MCNC: 105 MG/DL (ref 65–100)
MAGNESIUM SERPL-MCNC: 2.4 MG/DL (ref 1.6–2.4)
POTASSIUM SERPL-SCNC: 3.6 MMOL/L (ref 3.5–5.1)
SERVICE CMNT-IMP: NORMAL
SODIUM SERPL-SCNC: 139 MMOL/L (ref 136–145)

## 2018-12-09 PROCEDURE — 77030029684 HC NEB SM VOL KT MONA -A

## 2018-12-09 PROCEDURE — 74011000250 HC RX REV CODE- 250: Performed by: HOSPITALIST

## 2018-12-09 PROCEDURE — 94640 AIRWAY INHALATION TREATMENT: CPT

## 2018-12-09 PROCEDURE — 36415 COLL VENOUS BLD VENIPUNCTURE: CPT

## 2018-12-09 PROCEDURE — 74011250637 HC RX REV CODE- 250/637: Performed by: HOSPITALIST

## 2018-12-09 PROCEDURE — 74011250636 HC RX REV CODE- 250/636: Performed by: INTERNAL MEDICINE

## 2018-12-09 PROCEDURE — 83735 ASSAY OF MAGNESIUM: CPT

## 2018-12-09 PROCEDURE — 80048 BASIC METABOLIC PNL TOTAL CA: CPT

## 2018-12-09 PROCEDURE — 93306 TTE W/DOPPLER COMPLETE: CPT

## 2018-12-09 PROCEDURE — 74011250637 HC RX REV CODE- 250/637: Performed by: INTERNAL MEDICINE

## 2018-12-09 PROCEDURE — 65660000000 HC RM CCU STEPDOWN

## 2018-12-09 PROCEDURE — 74011250636 HC RX REV CODE- 250/636: Performed by: HOSPITALIST

## 2018-12-09 PROCEDURE — 77010033678 HC OXYGEN DAILY

## 2018-12-09 RX ADMIN — Medication 5 ML: at 06:00

## 2018-12-09 RX ADMIN — CARVEDILOL 25 MG: 12.5 TABLET, FILM COATED ORAL at 10:50

## 2018-12-09 RX ADMIN — LATANOPROST 1 DROP: 50 SOLUTION OPHTHALMIC at 21:33

## 2018-12-09 RX ADMIN — IPRATROPIUM BROMIDE AND ALBUTEROL SULFATE 3 ML: .5; 3 SOLUTION RESPIRATORY (INHALATION) at 19:27

## 2018-12-09 RX ADMIN — ENOXAPARIN SODIUM 40 MG: 40 INJECTION SUBCUTANEOUS at 10:50

## 2018-12-09 RX ADMIN — ATORVASTATIN CALCIUM 40 MG: 40 TABLET, FILM COATED ORAL at 10:50

## 2018-12-09 RX ADMIN — FLUTICASONE FUROATE 1 PUFF: 200 POWDER RESPIRATORY (INHALATION) at 10:51

## 2018-12-09 RX ADMIN — CARVEDILOL 25 MG: 12.5 TABLET, FILM COATED ORAL at 17:30

## 2018-12-09 RX ADMIN — POTASSIUM CHLORIDE 10 MEQ: 750 TABLET, EXTENDED RELEASE ORAL at 10:50

## 2018-12-09 RX ADMIN — Medication 10 ML: at 21:33

## 2018-12-09 RX ADMIN — FUROSEMIDE 80 MG: 10 INJECTION, SOLUTION INTRAMUSCULAR; INTRAVENOUS at 10:50

## 2018-12-09 RX ADMIN — Medication 10 ML: at 13:52

## 2018-12-09 RX ADMIN — FUROSEMIDE 80 MG: 10 INJECTION, SOLUTION INTRAMUSCULAR; INTRAVENOUS at 21:33

## 2018-12-09 NOTE — PROGRESS NOTES
Hospitalist Progress Note NAME: Elena Li :  1934 MRN:  [id number] Assessment / Plan: 
Acute on chronic hypoxic respiratory failure POA- currently stable on 2.5 L/m oxygen as at baseline, improved 
due to acute on chronic systolic & diastolic CHF POA- weight gain, dietary salt indiscretion per pt 
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  
HTN 
-BP/HR stable 
+ VILLAR/worsening LE edema/ weight gain subjectively 
cxray reported as clear CT wo contrast - neg for acute ASD in lungs DDmier equivocal at 2.4 Tapered to Oxygen at 2L/m as at home/baseline- maintain Cont IV diureses with lasix at home dose- 80mg BID Close monitoring daily weight /cr/K Agree Clinically low suspicious for PE (low pre-test probability)- will check DDimer as pt has failed to lay flat to ronnie V/Q scan & cannot get CT chest with Contrast (due to contrast allergy)- DDimer equivocal with clinical improvement with IV diuresis- will cease any further workup for PE at this time With recent significant GI bleed will avoid empiric [facility] Cont coreg Keep holding ACE- allow for aggressive diureses IP Cardiology consult appreciated- agrees to cont diuresis , suspects mild CHF exacerbation due to non compliance Check 2DECHO when able 
  
Recent lower GI bleed 10/2018 
- likely was due to diverticulosis 
-Hb stable today. ASA on hold since last admission . Restart if h/h stable -will resume ASA 
  
Remote hx PUD, continue PPI 
L thyroid mass, chronic problem since childhood Hx frequent uti, continue daily cipro prophylaxis Hyperlipidemia, cont statin COPD wo exacerbation, cont Pulmicort, jet nebs prn  
  
  
  
  
Code Status: Full code d/w pt Surrogate Decision Maker: pt has 7 children; pt wants her 2 dtrs Umu Muñoz and Vika to be her surrogate decision makers if needed  
  
DVT Prophylaxis: on lovenox SQ  
GI Prophylaxis: not indicated 
  
Baseline: 2 sons live with her; WC bound 30.0 - 39.9 Obese / Body mass index is 38.7 kg/m². Weight loss recommended - diet, exercise Recommended Disposition: Home w/Family, SNF/LTC and HH PT, OT, RN- PT cleared pt with no needs- may need H2H visit for CHF close  followup Subjective: Chief Complaint / Reason for Physician Visit: F/U CHF exacerbation, acute/chronic resp failure , Weight gain \"I am better\". Discussed with RN events overnight. Review of Systems: 
Symptom Y/N Comments  Symptom Y/N Comments Fever/Chills n   Chest Pain n   
Poor Appetite n   Edema n   
Cough n   Abdominal Pain n   
Sputum n   Joint Pain SOB/VILLAR y   Pruritis/Rash Nausea/vomit    Tolerating PT/OT y Diarrhea    Tolerating Diet y Constipation    Other Could NOT obtain due to:   
 
Objective: VITALS:  
Last 24hrs VS reviewed since prior progress note. Most recent are: 
Patient Vitals for the past 24 hrs: 
 Temp Pulse Resp BP SpO2  
12/09/18 0713 98.2 °F (36.8 °C) 83 20 130/70 99 % 12/09/18 0231 97.6 °F (36.4 °C) 73 20 123/68 99 % 12/08/18 2256 97.6 °F (36.4 °C) 60 20 122/73 100 % 12/08/18 2112  78  131/78   
12/08/18 1909 98.1 °F (36.7 °C) 70 20 128/70 95 % 12/08/18 1741  86  144/86   
12/08/18 1457 98.4 °F (36.9 °C) 85 20 112/83 95 % 12/08/18 1041 98.1 °F (36.7 °C) 80 22 (!) 137/106 93 % Intake/Output Summary (Last 24 hours) at 12/9/2018 2168 Last data filed at 12/9/2018 8453 Gross per 24 hour Intake 360 ml Output 1650 ml Net -1290 ml PHYSICAL EXAM: 
General: WD, WN. Alert, cooperative, no acute distress, Obese +   
EENT:  EOMI. Anicteric sclerae. MMM Resp:  Distant BS bilaterally, not clear wheezing or Crackles noted +  No accessory muscle use CV:  Regular  rhythm,  2+ LE pitting edema noted + GI:  Soft, Non distended, Non tender.  +Bowel sounds Neurologic:  Alert and oriented X 3, normal speech, Psych:   Good insight. Not anxious nor agitated Skin:  No rashes. No jaundice Reviewed most current lab test results and cultures  YES Reviewed most current radiology test results   YES Review and summation of old records today    NO Reviewed patient's current orders and MAR    YES 
PMH/SH reviewed - no change compared to H&P 
________________________________________________________________________ Care Plan discussed with: 
  Comments Patient x Family  x At bedside yesterday RN x Care Manager Consultant Multidiciplinary team rounds were held today with , nursing, pharmacist and clinical coordinator. Patient's plan of care was discussed; medications were reviewed and discharge planning was addressed. ________________________________________________________________________ Total NON critical care TIME:  26   Minutes Total CRITICAL CARE TIME Spent:   Minutes non procedure based Comments >50% of visit spent in counseling and coordination of care    
________________________________________________________________________ Ivon Huff MD  
 
Procedures: see electronic medical records for all procedures/Xrays and details which were not copied into this note but were reviewed prior to creation of Plan. LABS: 
I reviewed today's most current labs and imaging studies. Pertinent labs include: 
Recent Labs 12/08/18 
0000 12/07/18 
1329 WBC 7.9 6.9 HGB 9.6* 10.3* HCT 33.2* 36.0  289 Recent Labs 12/09/18 
0315 12/08/18 
0000 12/07/18 
1719 12/07/18 
1329  139  --  141  
K 3.6 3.6  --  3.9 CL 99 99  --  99  
CO2 37* 37*  --  37* * 121*  --  93 BUN 15 16  --  17  
CREA 0.90 1.03*  --  1.04* CA 8.7 8.7  --  8.8 MG 2.4 2.4  --   --   
PHOS  --  3.4  --   --   
ALB  --   --   --  3.9 TBILI  --   --   --  0.9 SGOT  --   --   --  22 ALT  --   --   --  22 INR  --   --  1.2*  --   
 
 
Signed: Ivon Huff MD

## 2018-12-09 NOTE — PROGRESS NOTES
CM alerted that the patient will be discharged likely within 24 hrs. And will need a H2H visit as she was diagnosed with a CHF exacerbation. CM placed referral for [facility] visit. [facility] visit is added to the patient's AVS. CM will continue to follow the patient for dispo needs. Therese Vital LCSW

## 2018-12-09 NOTE — PROGRESS NOTES
[facility] [address], [place], [address] [facility]  [phone] Cardiology Progress Note 
 
 
12/9/2018 12:30 PM 
 
Admit Date: 12/7/2018 Admit Diagnosis:  
Heart failure ([address] Subjective:  
 
Trinda Belts Breathing better Visit Vitals /68 (BP 1 Location: Right arm, BP Patient Position: At rest) Pulse 82 Temp 98.2 °F (36.8 °C) Resp 18 Ht 5' 1\" (1.549 m) Wt 204 lb 12.8 oz (92.9 kg) SpO2 97% BMI 38.70 kg/m² Current Facility-Administered Medications Medication Dose Route Frequency  potassium chloride SR (KLOR-CON 10) tablet 10 mEq  10 mEq Oral DAILY  enoxaparin (LOVENOX) injection 40 mg  40 mg SubCUTAneous Q24H  
 furosemide (LASIX) injection 80 mg  80 mg IntraVENous Q12H  
 atorvastatin (LIPITOR) tablet 40 mg  40 mg Oral DAILY  albuterol-ipratropium (DUO-NEB) 2.5 MG-0.5 MG/3 ML  3 mL Nebulization Q4H PRN  
 carvedilol (COREG) tablet 25 mg  25 mg Oral BID WITH MEALS  latanoprost (XALATAN) 0.005 % ophthalmic solution 1 Drop  1 Drop Both Eyes QHS  pantoprazole (PROTONIX) tablet 40 mg  40 mg Oral DAILY PRN  
 nitroglycerin (NITROSTAT) tablet 0.4 mg  0.4 mg SubLINGual Q5MIN PRN  
 sodium chloride (NS) flush 5-10 mL  5-10 mL IntraVENous Q8H  
 sodium chloride (NS) flush 5-10 mL  5-10 mL IntraVENous PRN  
 acetaminophen (TYLENOL) tablet 650 mg  650 mg Oral Q4H PRN  
 naloxone (NARCAN) injection 0.4 mg  0.4 mg IntraVENous PRN  
 ondansetron (ZOFRAN) injection 4 mg  4 mg IntraVENous Q4H PRN  
 fluticasone furoate (ARNUITY ELLIPTA) 200 mcg/puff  1 Puff Inhalation DAILY Facility-Administered Medications Ordered in Other Encounters Medication Dose Route Frequency  ADDaptor  vancomycin (VANCOCIN) 1,000 mg injection  0.9% sodium chloride (MBP/ADV) 0.9 % infusion  bacitracin 50,000 unit injection Objective:  
  
Physical Exam: 
General Appearance:   
Chest:   Clear Cardiovascular: RRR  
 Extremities: 2+ edema Skin:  Warm and dry.  
 
Data Review:  
Recent Labs 12/08/18 
0000 12/07/18 
1329 WBC 7.9 6.9 HGB 9.6* 10.3* HCT 33.2* 36.0  289 Recent Labs 12/09/18 
0315 12/08/18 
0000 12/07/18 
1719 12/07/18 
1329  139  --  141  
K 3.6 3.6  --  3.9 CL 99 99  --  99  
CO2 37* 37*  --  37* * 121*  --  93 BUN 15 16  --  17  
CREA 0.90 1.03*  --  1.04* CA 8.7 8.7  --  8.8 MG 2.4 2.4  --   --   
PHOS  --  3.4  --   --   
ALB  --   --   --  3.9 TBILI  --   --   --  0.9 SGOT  --   --   --  22 ALT  --   --   --  22 INR  --   --  1.2*  --   
 
 
Recent Labs 12/07/18 
1329 TROIQ <0.05  
CPK 63 CKMB <1.0 Intake/Output Summary (Last 24 hours) at 12/9/2018 1230 Last data filed at 12/9/2018 1576 Gross per 24 hour Intake 360 ml Output 1650 ml Net -1290 ml Telemetry:  
EKG: 
Cxray: 
 
Assessment:  
 
Principal Problem: 
  Systolic and diastolic CHF, acute on chronic ([address] (9/3/2014) Active Problems: 
  Chronic systolic heart failure ([address] (3/8/2012) Chronic diastolic heart failure ([address] (3/8/2012) Moderate to severe pulmonary hypertension ([address] (6/12/2013) Acute-on-chronic respiratory failure ([address] (8/27/2014) S/P implantation of automatic cardioverter/defibrillator (AICD) (1/14/2015) Overview: [employer] upgrade to AICD implant Plan:  
 
Slowly improving. Cont diuresis Teodora Parra M.D., [facility]

## 2018-12-09 NOTE — PROGRESS NOTES
Report received from [facility], [address] [place] [address]. SBAR, Kardex, Recent Results or Cardiac Rhythm nsr were discussed. , RN assumed care of the pt. Vicki Nichole RN 
 
 
 
 
 
 
Problem: Falls - Risk of 
Goal: *Absence of Falls Document [place] Rank Fall Risk and appropriate interventions in the flowsheet. Outcome: Progressing Towards Goal 
Fall Risk Interventions: 
Mobility Interventions: Bed/chair exit alarm, Communicate number of staff needed for ambulation/transfer, Mechanical lift, OT consult for ADLs, Patient to call before getting OOB, PT Consult for mobility concerns, PT Consult for assist device competence Medication Interventions: Assess postural VS orthostatic hypotension, Bed/chair exit alarm, Evaluate medications/consider consulting pharmacy, Patient to call before getting OOB, Teach patient to arise slowly, Utilize gait belt for transfers/ambulation Elimination Interventions: Bed/chair exit alarm, Call light in reach, Elevated toilet seat, Patient to call for help with toileting needs, Toilet paper/wipes in reach, Toileting schedule/hourly rounds, Urinal in reach Problem: Pressure Injury - Risk of 
Goal: *Prevention of pressure injury Document [place] Scale and appropriate interventions in the flowsheet. Pressure Injury Interventions: 
Sensory Interventions: Assess changes in LOC Moisture Interventions: Absorbent underpads, Offer toileting Q_hr Activity Interventions: Assess need for specialty bed, Chair cushion, Increase time out of bed, Pressure redistribution bed/mattress(bed type), PT/OT evaluation, Trapeze to reposition Mobility Interventions: Assess need for specialty bed, Chair cushion, Float heels, HOB 30 degrees or less, Pressure redistribution bed/mattress (bed type), PT/OT evaluation, Suspension boots, Trapeze to reposition Nutrition Interventions: Document food/fluid/supplement intake, Discuss nutritional consult with provider, Offer support with meals,snacks and hydration Friction and Shear Interventions: Apply protective barrier, creams and emollients, Feet elevated on foot rest, Foam dressings/transparent film/skin sealants, HOB 30 degrees or less, Lift sheet, Lift team/patient mobility team, Minimize layers, Sit at 90-degree angle

## 2018-12-10 VITALS
DIASTOLIC BLOOD PRESSURE: 83 MMHG | RESPIRATION RATE: 20 BRPM | OXYGEN SATURATION: 96 % | TEMPERATURE: 98.1 F | BODY MASS INDEX: 38 KG/M2 | HEIGHT: 61 IN | SYSTOLIC BLOOD PRESSURE: 120 MMHG | WEIGHT: 201.28 LBS | HEART RATE: 70 BPM

## 2018-12-10 LAB
ANION GAP SERPL CALC-SCNC: 2 MMOL/L (ref 5–15)
BUN SERPL-MCNC: 17 MG/DL (ref 6–20)
BUN/CREAT SERPL: 17 (ref 12–20)
CALCIUM SERPL-MCNC: 8.9 MG/DL (ref 8.5–10.1)
CHLORIDE SERPL-SCNC: 98 MMOL/L (ref 97–108)
CO2 SERPL-SCNC: 38 MMOL/L (ref 21–32)
COMMENT, HOLDF: NORMAL
CREAT SERPL-MCNC: 0.99 MG/DL (ref 0.55–1.02)
GLUCOSE SERPL-MCNC: 89 MG/DL (ref 65–100)
POTASSIUM SERPL-SCNC: 3.4 MMOL/L (ref 3.5–5.1)
SAMPLES BEING HELD,HOLD: NORMAL
SODIUM SERPL-SCNC: 138 MMOL/L (ref 136–145)

## 2018-12-10 PROCEDURE — G8989 SELF CARE D/C STATUS: HCPCS | Performed by: OCCUPATIONAL THERAPIST

## 2018-12-10 PROCEDURE — 77010033678 HC OXYGEN DAILY

## 2018-12-10 PROCEDURE — 74011250636 HC RX REV CODE- 250/636: Performed by: HOSPITALIST

## 2018-12-10 PROCEDURE — 97165 OT EVAL LOW COMPLEX 30 MIN: CPT | Performed by: OCCUPATIONAL THERAPIST

## 2018-12-10 PROCEDURE — G8988 SELF CARE GOAL STATUS: HCPCS | Performed by: OCCUPATIONAL THERAPIST

## 2018-12-10 PROCEDURE — 74011250636 HC RX REV CODE- 250/636: Performed by: INTERNAL MEDICINE

## 2018-12-10 PROCEDURE — 36415 COLL VENOUS BLD VENIPUNCTURE: CPT

## 2018-12-10 PROCEDURE — 74011250637 HC RX REV CODE- 250/637: Performed by: NURSE PRACTITIONER

## 2018-12-10 PROCEDURE — G8987 SELF CARE CURRENT STATUS: HCPCS | Performed by: OCCUPATIONAL THERAPIST

## 2018-12-10 PROCEDURE — 80048 BASIC METABOLIC PNL TOTAL CA: CPT

## 2018-12-10 PROCEDURE — 94760 N-INVAS EAR/PLS OXIMETRY 1: CPT

## 2018-12-10 PROCEDURE — 97535 SELF CARE MNGMENT TRAINING: CPT | Performed by: OCCUPATIONAL THERAPIST

## 2018-12-10 PROCEDURE — 74011250637 HC RX REV CODE- 250/637: Performed by: HOSPITALIST

## 2018-12-10 PROCEDURE — 74011250637 HC RX REV CODE- 250/637: Performed by: INTERNAL MEDICINE

## 2018-12-10 RX ORDER — LISINOPRIL 5 MG/1
5 TABLET ORAL DAILY
Status: DISCONTINUED | OUTPATIENT
Start: 2018-12-10 | End: 2018-12-10 | Stop reason: HOSPADM

## 2018-12-10 RX ORDER — LISINOPRIL 5 MG/1
5 TABLET ORAL DAILY
Status: DISCONTINUED | OUTPATIENT
Start: 2018-12-11 | End: 2018-12-10

## 2018-12-10 RX ORDER — POTASSIUM CHLORIDE 20 MEQ/1
20 TABLET, EXTENDED RELEASE ORAL
Status: COMPLETED | OUTPATIENT
Start: 2018-12-10 | End: 2018-12-10

## 2018-12-10 RX ORDER — METOLAZONE 2.5 MG/1
2.5 TABLET ORAL
Qty: 12 TAB | Refills: 0 | Status: SHIPPED | OUTPATIENT
Start: 2018-12-10 | End: 2018-01-01 | Stop reason: ALTCHOICE

## 2018-12-10 RX ORDER — METOLAZONE 2.5 MG/1
2.5 TABLET ORAL
Status: DISCONTINUED | OUTPATIENT
Start: 2018-12-10 | End: 2018-12-10 | Stop reason: HOSPADM

## 2018-12-10 RX ADMIN — CARVEDILOL 25 MG: 12.5 TABLET, FILM COATED ORAL at 09:08

## 2018-12-10 RX ADMIN — FUROSEMIDE 80 MG: 10 INJECTION, SOLUTION INTRAMUSCULAR; INTRAVENOUS at 09:08

## 2018-12-10 RX ADMIN — FLUTICASONE FUROATE 1 PUFF: 200 POWDER RESPIRATORY (INHALATION) at 09:11

## 2018-12-10 RX ADMIN — ATORVASTATIN CALCIUM 40 MG: 40 TABLET, FILM COATED ORAL at 09:08

## 2018-12-10 RX ADMIN — POTASSIUM CHLORIDE 10 MEQ: 750 TABLET, EXTENDED RELEASE ORAL at 09:12

## 2018-12-10 RX ADMIN — ENOXAPARIN SODIUM 40 MG: 40 INJECTION SUBCUTANEOUS at 11:47

## 2018-12-10 RX ADMIN — LISINOPRIL 5 MG: 5 TABLET ORAL at 16:11

## 2018-12-10 RX ADMIN — Medication 10 ML: at 06:33

## 2018-12-10 RX ADMIN — POTASSIUM CHLORIDE 20 MEQ: 20 TABLET, EXTENDED RELEASE ORAL at 09:10

## 2018-12-10 RX ADMIN — CARVEDILOL 25 MG: 12.5 TABLET, FILM COATED ORAL at 16:10

## 2018-12-10 NOTE — PROGRESS NOTES
Occupational Therapy EVALUATION/discharge Patient: Dania Ramirez (34 y.o. female) Date: 12/10/2018 Primary Diagnosis: Heart failure ([facility] Precautions:   Bed Alarm, Fall ASSESSMENT:  
Based on the objective data described below, the patient presents with decreased standing balance, mild GW, decreased activity tolerance and decreased safety awareness which is impairing her functional independence, presently and at baseline. Patient demonstrating the ability to perform ADLs at an independent to min A level, which is better than her baseline status. She is also at or very near he functional mobility baseline, performing at an independent to SBA level. Per patient's daughter the patient prefers the her assistance for dressing and bathing over performing her ADLs more independently. Further skilled acute occupational therapy is not indicated at this time. Patient and the patient's daughter/primary caregiver are in agreement that patient is at her baseline and has not OT needs. Discharge Recommendations: None Further Equipment Recommendations for Discharge: no therapy needs. Supervision OBJECTIVE DATA SUMMARY:  
HISTORY:  
Past Medical History:  
Diagnosis Date  Asthma  Autoimmune disease ([facility] lupus  CAD (coronary artery disease)   
 cardiac cath  CAD (coronary artery disease) sick sinus syndrome  Essential hypertension  GERD (gastroesophageal reflux disease)  Glaucoma  Heart failure ([facility] [address] Lupus  Hypertension  Other ill-defined conditions(799.89)  Pacemaker  PUD (peptic ulcer disease)  S/P implantation of automatic cardioverter/defibrillator (AICD) 1/14/2015 [address] upgrade to AICD implant  SSS (sick sinus syndrome) ([facility] Past Surgical History:  
Procedure Laterality Date  CARDIAC SURG PROCEDURE UNLIST    
 pacemaker/defibrilator.   
 HX GYN    
 BTL  
 HX PACEMAKER    
  [place] COLONOSCOPY W/BIOPSY SINGLE/MULTIPLE  3/26/2012  [place] COLSC FLX W/RMVL OF TUMOR POLYP LESION SNARE TQ  3/26/2012 Prior Level of Function/Environment/Context: Lives with daughter who provides max A for all dressing and bathing, supervision/SBA for transfers and short distance ambulation with a RW, is supervision for grooming and toileting and is independent with self feeding. Patient is fairly sedentary with limited activity tolerance. She is on 2.5 L NC at all times. Occupations in which the patient is/was successful, what are the barriers preventing that success:  
Performance Patterns (routines, roles, habits, and rituals):  
Personal Interests and/or values:  
Expanded or extensive additional review of patient history:  
 
Home Situation Home Environment: Private residence # Steps to Enter: 0 Wheelchair Ramp: No 
One/Two Story Residence: One story Living Alone: No 
Support Systems: Child(timmy) Patient Expects to be Discharged to[de-identified] Private residence Current DME Used/Available at Home: Cane, straight, Commode, bedside, Wheelchair, Walker, rolling Tub or Shower Type: Tub/Shower combination Hand dominance: Right EXAMINATION OF PERFORMANCE DEFICITS: 
Cognitive/Behavioral Status: 
Neurologic State: Alert Orientation Level: Oriented to person;Oriented to place Cognition: Follows commands; Appropriate for age attention/concentration Safety/Judgement: Decreased awareness of need for safety Hearing: Auditory Auditory Impairment: None Vision/Perceptual:   
    
    
    
  
    
Acuity: Within Defined Limits Corrective Lenses: Contacts Range of Motion: 
AROM: Generally decreased, functional 
  
  
  
  
  
  
  
Strength: 
Strength: Generally decreased, functional 
  
  
  
  
 
Coordination: 
Coordination: Within functional limits Tone & Sensation: 
Tone: Normal 
Sensation: Intact Balance: 
Sitting: Intact Standing: Impaired(with support of RW) Standing - Static: Good Standing - Dynamic : Good Functional Mobility and Transfers for ADLs:Bed Mobility: 
Rolling: Independent Supine to Sit: Independent Scooting: Independent Transfers: 
Sit to Stand: Independent Stand to Sit: Stand-by assistance Bed to Chair: Stand-by assistance Bathroom Mobility: Stand-by assistance(ambulating with a RW) Toilet Transfer : Supervision ADL Assessment: 
Feeding: Independent Oral Facial Hygiene/Grooming: Supervision Bathing: Minimum assistance Upper Body Dressing: Minimum assistance Lower Body Dressing: Minimum assistance Toileting: Supervision Functional Measure: 
Barthel Index: 
 
Bathin Bladder: 10 Bowels: 10 
Groomin Dressin Feeding: 10 Mobility: 0 Stairs: 0 Toilet Use: 10 Transfer (Bed to Chair and Back): 10 Total: 55 Barthel and G-code impairment scale: 
Percentage of impairment CH 
0% CI 
1-19% CJ 
20-39% CK 
40-59% CL 
60-79% CM 
80-99% CN 
100% Barthel Score 0-100 100 99-80 79-60 59-40 20-39 1-19 
 0 Barthel Score 0-20 20 17-19 13-16 9-12 5-8 1-4 0 The Barthel ADL Index: Guidelines 1. The index should be used as a record of what a patient does, not as a record of what a patient could do. 2. The main aim is to establish degree of independence from any help, physical or verbal, however minor and for whatever reason. 3. The need for supervision renders the patient not independent. 4. A patient's performance should be established using the best available evidence. Asking the patient, friends/relatives and nurses are the usual sources, but direct observation and common sense are also important. However direct testing is not needed. 5. Usually the patient's performance over the preceding 24-48 hours is important, but occasionally longer periods will be relevant. 6. Middle categories imply that the patient supplies over 50 per cent of the effort. 7. Use of aids to be independent is allowed. Fayrene Back., Barthel, D.W. (2306). Functional evaluation: the Barthel Index. [address] (14)2. SULLY Shah Sa, Bonnie Perla.Zohreh., [place], [address] (1999). Measuring the change indisability after inpatient rehabilitation; comparison of the responsiveness of the Barthel Index and Functional [place] Measure. Journal of Neurology, Neurosurgery, and Psychiatry, 66(4), 997-829. CHARI MurphyA, CHAPITO Gonzalez, & Trent Sanchez M.A. (2004.) Assessment of post-stroke quality of life in cost-effectiveness studies: The usefulness of the Barthel Index and the EuroQoL-5D. [facility], 13 432-18 G codes: In compliance with CMSs Claims Based Outcome Reporting, the following G-code set was chosen for this patient based on their primary functional limitation being treated: The outcome measure chosen to determine the severity of the functional limitation was the Barthel index with a score of 55/100 which was correlated with the impairment scale. ? Self Care:  
  - CURRENT STATUS: CK - 40%-59% impaired, limited or restricted  - GOAL STATUS: CK - 40%-59% impaired, limited or restricted  - D/C STATUS:  CK - 40%-59% impaired, limited or restricted Pain: 
Pain Scale 1: Numeric (0 - 10) Pain Intensity 1: 0 Activity Tolerance:  
Desaturated to 82% during activity on 3 L NC, but recovers quickly with coaching on the use of pursed lip breathing Please refer to the flowsheet for vital signs taken during this treatment. After treatment:  
[x]  Patient left in no apparent distress sitting up in chair 
[]  Patient left in no apparent distress in bed 
[x]  Call bell left within reach [x]  Nursing notified 
[]  Caregiver present [x]  Bed alarm activated COMMUNICATION/EDUCATION:  
Communication/Collaboration: 
[]      Home safety education was provided and the patient/caregiver indicated understanding. [x]      Patient/family have participated as able and agree with findings and recommendations. []      Patient is unable to participate in plan of care at this time. Findings and recommendations were discussed with: Registered Nurse Kev Flores, OTR/L Time Calculation: 27 mins

## 2018-12-10 NOTE — FACE TO FACE
Face to Face Order for [address] [place] [facility] Pt Name  Ramy Singh Date of Birth 1934 Age  80 y.o. Medical Record Number  [id number] Room Number  2223/01 Admit date:  12/7/2018 Date of Face to Face:  12/10/2018 Admission Diagnosis:  Systolic and diastolic CHF, acute on chronic ([address] Diagnoses Present on Admission:  S/P implantation of automatic cardioverter/defibrillator (AICD)  Moderate to severe pulmonary hypertension (HCC)  Chronic diastolic heart failure ([address]  Chronic systolic heart failure ([address]  Acute-on-chronic respiratory failure ([address]  Systolic and diastolic CHF, acute on chronic (HCC)  HTN (hypertension) Past Medical History:  
Diagnosis Date  Asthma  Autoimmune disease ([facility] lupus  CAD (coronary artery disease)   
 cardiac cath  CAD (coronary artery disease) sick sinus syndrome  Essential hypertension  GERD (gastroesophageal reflux disease)  Glaucoma  Heart failure ([facility] [address] Lupus  Hypertension  Other ill-defined conditions(799.89)  Pacemaker  PUD (peptic ulcer disease)  S/P implantation of automatic cardioverter/defibrillator (AICD) 1/14/2015 [address] upgrade to AICD implant  SSS (sick sinus syndrome) ([facility] Visit Vitals /73 (BP 1 Location: Right arm, BP Patient Position: Supine) Pulse 85 Temp 98 °F (36.7 °C) Resp 20 Ht 5' 1\" (1.549 m) Wt 91.3 kg (201 lb 4.5 oz) SpO2 99% BMI 38.03 kg/m² Allergies Allergen Reactions  Contrast Agent [Iodine] Anaphylaxis Made aware of allergy 1/4/13  Sulfa (Sulfonamide Antibiotics) Hives  Codeine Anxiety  Pcn [Penicillins] Anaphylaxis Reaction was to penicillin injections into buttocks. She can take oral amoxicillin ? I certify that the patient needs home health care as prescribed below and I will not be following this patient in the Community. ? I also certify that the patient is homebound as evidenced by 
 
? Patient with increased shortness of breath and elevated heart rate with ambulation greater than 20 feet limiting patient's ability to ambulate safely within the community. ? Requires considerable and taxing effort to leave the home   
? and also as noted in various sections of this medical record. ? Dr. Sangita Underwood MD  will be responsible for signing the Plan of Care and will follow/coordinate ongoing care. ? Initial Orders for Care: 
? skilled nursing care:  skilled observation/assessment, patient education ? physical therapy: strengthening, stretching/ROM, transfer training, gait/stair training and balance training 
? occupational therapy:  ADL safety (ie. cooking, bathing, dressing), ROM and pt/caregiver education ? Report to Lillian Jovel MD 
 
? I certify that I am taking care of the patient today (Please see hospital Discharge Records for details of clinical issues pertaining to this order). ________________________________________________________________________ HyunSun MANDEEP Kelly, FNP-C, APRN-BC 
(electronically signed; no signature required ) Hospitalist, [address], [address], [address] Tel: [phone]

## 2018-12-10 NOTE — PROGRESS NOTES
Reason for Admission:   Heart Failure RRAT Score:     22 Resources/supports as identified by patient/family:   Seven children in the area Top Challenges facing patient (as identified by patient/family and CM): Finances/Medication cost?    no  issues Transportation? No issues Support system or lack thereof? Living arrangements? Lives with two sons in a one story home with no steps to enter the home Self-care/ADLs/Cognition? Patient is independent Current Advanced Directive/Advance Care Plan:  Not on file Plan for utilizing home health:   [address] Likelihood of readmission: High 
              
Transition of Care Plan:   Patient is independent with ADL/IADL but most times daughter assist with bathing. Patient denies past HH/Rehab. Patient admits to using DME (waker, cane, w/c, b/c and oxygen). CM offered pt a H2H visit and pt accepted. Per consult, [facility] will check BMP on Thursday, but result will be forwarded to pt PCP. CM will notify pt daughter of this. Patients daughter is in the room and will transport pt home. Patient/family has requested a list of [address] agencies in the area. PCP- [facility] Care Management Interventions PCP Verified by CM: Yes(Dr Glover) Mode of Transport at Discharge: Other (see comment)(Daughter) Transition of Care Consult (CM Consult): Discharge Planning Discharge Durable Medical Equipment: No(uses a walker, cane and w/c, b/c and oxygen) Physical Therapy Consult: Yes Occupational Therapy Consult: Yes Speech Therapy Consult: No 
Current Support Network: Relative's Home(Lives with two sons in a one story home with no steps to enter the home) Confirm Follow Up Transport: Family Plan discussed with Pt/Family/Caregiver: Yes(daughter in room ) Discharge Location Discharge Placement: [facility] Ext [id number]

## 2018-12-10 NOTE — PROGRESS NOTES
Have discussed medications and follow up with patient and family. Have discussed follow up. Clarified medication questions. Discussed follow up with labs for kidney function. Brenda Hung She will monitor weight at home and follow up the protocol for calling the doctor for weight gain, increased swelling, increased shortness of breath.patient will be discharged with 02. She has her 02 tank from home.

## 2018-12-10 NOTE — PROGRESS NOTES
[address] [place] [address]  [phone] Cardiology Progress Note 12/10/2018 12:30 PM 
 
Admit Date: 12/7/2018 Admit Diagnosis:  
Heart failure ([address] Subjective:  
 
Dania Ramirez has no c/o CP, still having some SOB, on home O2. Diuresed well, likely will need additional metolazone. Visit Vitals /73 (BP 1 Location: Right arm, BP Patient Position: Supine) Pulse 85 Temp 98 °F (36.7 °C) Resp 20 Ht 5' 1\" (1.549 m) Wt 91.3 kg (201 lb 4.5 oz) SpO2 99% BMI 38.03 kg/m² Current Facility-Administered Medications Medication Dose Route Frequency  lisinopril (PRINIVIL, ZESTRIL) tablet 5 mg  5 mg Oral DAILY  potassium chloride SR (KLOR-CON 10) tablet 10 mEq  10 mEq Oral DAILY  enoxaparin (LOVENOX) injection 40 mg  40 mg SubCUTAneous Q24H  
 furosemide (LASIX) injection 80 mg  80 mg IntraVENous Q12H  
 atorvastatin (LIPITOR) tablet 40 mg  40 mg Oral DAILY  albuterol-ipratropium (DUO-NEB) 2.5 MG-0.5 MG/3 ML  3 mL Nebulization Q4H PRN  
 carvedilol (COREG) tablet 25 mg  25 mg Oral BID WITH MEALS  latanoprost (XALATAN) 0.005 % ophthalmic solution 1 Drop  1 Drop Both Eyes QHS  pantoprazole (PROTONIX) tablet 40 mg  40 mg Oral DAILY PRN  
 nitroglycerin (NITROSTAT) tablet 0.4 mg  0.4 mg SubLINGual Q5MIN PRN  
 sodium chloride (NS) flush 5-10 mL  5-10 mL IntraVENous Q8H  
 sodium chloride (NS) flush 5-10 mL  5-10 mL IntraVENous PRN  
 acetaminophen (TYLENOL) tablet 650 mg  650 mg Oral Q4H PRN  
 naloxone (NARCAN) injection 0.4 mg  0.4 mg IntraVENous PRN  
 ondansetron (ZOFRAN) injection 4 mg  4 mg IntraVENous Q4H PRN  
 fluticasone furoate (ARNUITY ELLIPTA) 200 mcg/puff  1 Puff Inhalation DAILY Facility-Administered Medications Ordered in Other Encounters Medication Dose Route Frequency  ADDaptor  vancomycin (VANCOCIN) 1,000 mg injection  0.9% sodium chloride (MBP/ADV) 0.9 % infusion  bacitracin 50,000 unit injection Objective:  
  
Physical Exam: 
General Appearance:  elderly AAF in no acute distress Chest:   vicky rales 1/2 way up Cardiovascular:  Regular rate and rhythm, 1/6 murmur.  
Abdomen:   Soft, non-tender, bowel sounds are active.  
Extremities: no peripheral edema Skin:  Warm and dry.  
 
Data Review:  
Recent Labs 12/08/18 
0000 WBC 7.9 HGB 9.6* HCT 33.2*  
 Recent Labs 12/10/18 
0514 12/09/18 
0315 12/08/18 
0000 12/07/18 
1719  139 139  --   
K 3.4* 3.6 3.6  --   
CL 98 99 99  --   
CO2 38* 37* 37*  --   
GLU 89 105* 121*  --   
BUN 17 15 16  --   
CREA 0.99 0.90 1.03*  --   
CA 8.9 8.7 8.7  --   
MG  --  2.4 2.4  --   
PHOS  --   --  3.4  --   
INR  --   --   --  1.2* No results for input(s): TROIQ, CPK, CKMB in the last 72 hours. Intake/Output Summary (Last 24 hours) at 12/10/2018 1430 Last data filed at 12/10/2018 6876 Gross per 24 hour Intake 240 ml Output 1400 ml Net -1160 ml Telemetry: SR, paced EKG: 
Cxray: 
 
Assessment:  
 
Principal Problem: 
  Systolic and diastolic CHF, acute on chronic ([address] (9/3/2014) Active Problems: 
  HTN (hypertension) (2/24/2012) Chronic systolic heart failure ([address] (3/8/2012) Chronic diastolic heart failure ([address] (3/8/2012) Moderate to severe pulmonary hypertension ([address] (6/12/2013) Acute-on-chronic respiratory failure ([address] (8/27/2014) S/P implantation of automatic cardioverter/defibrillator (AICD) (1/14/2015) Overview: [employer] upgrade to AICD implant Plan:  
 
Systolic and diastolic HF, acute on chronic: 
Diuresing well, weight trending down Patient admits to not following NA restrictions, reinforced diet. Continue on Bumex 2mg BID and add metolazone 2.5mg twice/wk. On current echo noted mod-sev MR which is though to be r/t volume overload. Will recheck echo in several weeks. F/u with RCA 1 week. Lele Cutler [facility] Patient seen and examined by me with nurse practitioner Lele Cutler. I personally performed all components of the history, physical, and medical decision making and agree with the assessment and plan with minor modifications as noted. Combined systolic/diastolic heart failure improved recommend addition of metolazone at discharge with close monitoring of weights which she had not previously been doing. Restarting lisinopril. Follow-up within 1 week. Thanks for the consult.

## 2018-12-10 NOTE — PROGRESS NOTES
ADULT PROTOCOL: JET AEROSOL ASSESSMENT Patient  Mariah Polanco     80 y.o.   female     12/10/2018  1:29 AM 
 
Breath Sounds Pre Procedure: Right Breath Sounds: Clear Left Breath Sounds: Clear Breath Sounds Post Procedure: Right Breath Sounds: Clear Left Breath Sounds: Clear Breathing pattern: Pre procedure Breathing Pattern: Dyspnea with exertion Post procedure Breathing Pattern: Regular Heart Rate: Pre procedure Pulse: 87 
         Post procedure Pulse: 84 Resp Rate: Pre procedure Respirations: 22 Post procedure Respirations: 20 Peak Flow: Pre bronchodilator Post bronchodilator FVC/FEV1:  N/A Incentive Spirometry:    
     
 
Cough: Pre procedure Cough: Non-productive Post procedure Cough: Non-productive Suctioned: NO Sputum: Pre procedure Post procedure Oxygen: O2 Device: Nasal cannula   2.5L NC Changed: NO SpO2: Pre procedure SpO2: 97 %   with oxygen Post procedure SpO2: 97 %  with oxygen Nebulizer Therapy: Current medications Aerosolized Medications: DuoNeb Changed: NO Smoking History:  
Smoking status: Never Smoker  Smokeless tobacco: Never Used Problem List:  
Patient Active Problem List  
Diagnosis Code  Sick sinus syndrome (HCC) I49.5  
 HTN (hypertension) I10  
 Hyperlipidemia E78.5  Chest pain R07.9  Chronic systolic heart failure (HCC) I50.22  
 Cardiac pacemaker in situ Z95.0  Cardiomyopathy in other diseases classified elsewhere I39  Chronic diastolic heart failure (HCC) I50.32  Atrial flutter (HCC) I48.92  
 Coronary atherosclerosis of native coronary artery I25.10  Rectal bleeding K62.5  Hypokalemia E87.6  Hypomagnesemia E83.42  
 Colitis, ischemic (HCC) K55.9  
 Ischemic colitis ([facility] [address] K55.9  Asthma J45.909  Moderate to severe pulmonary hypertension (HCC) I27.20  Acute-on-chronic respiratory failure (HCC) J96.20  Hypothyroidism E03.9  Systolic and diastolic CHF, acute on chronic (HCC) I50.43  
 Pulmonary HTN (HCC) I27.20  S/P implantation of automatic cardioverter/defibrillator (AICD) Z95.810  Syncope R55  Diverticulosis K57.90 Respiratory Therapist: Faviola Honeycutt RT

## 2018-12-10 NOTE — PROGRESS NOTES
02 challenge Earlier challenge today Patient'ssats on 2.5 lpm at rest 99% Patient's sats on 2.5 lpm ambulating 81% Patient became sob and had to have a few minutes to recover. Patient's sats on 4 lpm 94% at rest 
Patient's sats on 4 lpm ambulating 88% and she needed time to recover when sitting.

## 2018-12-10 NOTE — PROGRESS NOTES
Attempted to make follow up appointment with PCP but had to leave a voice message. Waiting for a call back to schedule hospital follow up.

## 2018-12-10 NOTE — DISCHARGE INSTRUCTIONS
Patient Discharge Instructions     Pt Name  Black Lynch   Date of Birth 1934   Age  80 y.o. Medical Record Number  [id number]   PCP Vangie Dudley MD    Admit date:  12/7/2018 @    [place] [address]    Room Number  2223/01   Date of Discharge 12/10/2018     Admission Diagnoses:     Systolic and diastolic CHF, acute on chronic (HCC)          Allergies   Allergen Reactions    Contrast Agent [Iodine] Anaphylaxis     Made aware of allergy 1/4/13    Sulfa (Sulfonamide Antibiotics) Hives    Codeine Anxiety    Pcn [Penicillins] Anaphylaxis     Reaction was to penicillin injections into buttocks. She can take oral amoxicillin        You were admitted to [address] for  Systolic and diastolic CHF, acute on chronic ([address]    YOUR OTHER MEDICAL DIAGNOSES INCLUDE (BUT NOT LIMITED TO ):  Present on Admission:   S/P implantation of automatic cardioverter/defibrillator (AICD)   Moderate to severe pulmonary hypertension (HCC)   Chronic diastolic heart failure (HCC)   Chronic systolic heart failure (HCC)   Acute-on-chronic respiratory failure (HCC)   Systolic and diastolic CHF, acute on chronic (HCC)   HTN (hypertension)      DIET:  Cardiac Diet     Recommended activity: Activity as tolerated  Follow up : Follow-up Information     Follow up With Specialties Details [facility]  [place] [address]  [phone]    Vangie Dudley MD [facility] Schedule an appointment as soon as possible for a visit in one week [facility]  1st floor  [facility] 7 [address] [facility]      Serjio Tejada MD Cardiology, [address] [facility] Vascular Surgery Schedule an appointment as soon as possible for a visit in 7-10 days [facility]  [phone]          Daily weights: Notify MD for a weight gain of 3 pounds or greater in one day or 5 pounds in one week.  Weight on discharge day: 201lbs  Please get your blood work, BMP on Thursday. The result should be send to your primary care doctor and Dr. Yvonne Valdez office. · It is important that you take the medication exactly as they are prescribed. · Keep your medication in the bottles provided by the pharmacist and keep a list of the medication names, dosages, and times to be taken in your wallet. · Do not take other medications without consulting your doctor. ADDITIONAL INFORMATION: If you experience any of the following symptoms or have any health problem not listed below, then please call your primary care physician or return to the emergency room if you cannot get hold of your doctor: Fever, chills, nausea, vomiting, diarrhea, change in mentation, falling, bleeding, shortness of breath. I understand that if any problems occur once I am discharged, I am supposed to call my Primary care physician for further care or seek help in the Emergency Department at the nearest Healthcare facility. I have had an opportunity to discuss my clinical issues with my doctor and nursing staff. I understand and acknowledge receipt of the above instructions.                                                                                                                                            Physician's or R.N.'s Signature                                                            Date/Time                                                                                                                                              Patient or Representative Signature                                                 Date/Time

## 2018-12-10 NOTE — DISCHARGE SUMMARY
Hospitalist Discharge Summary Patient ID: 
Cristian Nolan [id number] 
89 y.o. 
1934 PCP on record: Ever Oliveira MD 
 
Admit date: 12/7/2018 Discharge date and time: 12/10/2018 DISCHARGE DIAGNOSIS: 
 
Acute on chronic hypoxic respiratory failure POA 
due to acute on chronic systolic & diastolic CHF POA 
CAD, hx MI / ischemic CMO EF 35-40%/ AICD / PPM  
HTN Recent lower GI bleed 10/2018 Remote hx PUD L thyroid mass Hx frequent uti Hyperlipidemia COPD wo exacerbation 
  
 
CONSULTATIONS: 
IP CONSULT TO CARDIOLOGY Excerpted HPI from H&P of Antonia Givens MD: 
Ventura Weaver is a 80 y.o.  female who presents with above complaint. Pt is very poor historian. She admits to feeling dyspneic with ambulation but cannot give details. She stated \" I know why I am SOB because I gained weight\". Pt denies cough/fever/chills/CP. History was obtained mostly from dtr at bedside. Per dtr, pt started with worsening LE edema and VILLAR ~ 2 weeks ago. Sx was building up slowly. Today pt was noted to be SOB even when talking. Today they tried to be seen in PCP office but was refer to Dr Rohit Tellez. They called cardiology office and advised to be seen in ED. Pt is on Lasix 80 mg BID at home and stated she is compliant. Pt stated she feels that she gained weight. Pt is chronically on 2.5 L of O2 but O2 was increased to 4 L today due to SOB. ______________________________________________________________________ DISCHARGE SUMMARY/HOSPITAL COURSE:  for full details see H&P, daily progress notes, labs, consult notes. Acute on chronic hypoxic respiratory failure POA 
currently stable on 2.5 L/m oxygen as at baseline (resting), needs O2 @ 4L with activity. Overall symptoms have improved 
due to acute on chronic systolic & diastolic CHF POA 
weight gain, dietary salt indiscretion per pt on admission. 
- Weight down to 91.3kg from 96.1kg. Daily Weights: Notify MD for a weight gain of 3 pounds or greater in one day or 5 pounds in one week. Weight on discharge: 201lbs 
- continue with Lasix 80mg PO BID; check BMP this Thursday. Metolazone three times a week was added during this admission. The result should be called to Dr. Yana Brewster and Dr. Macel Paget 
- continue daily KCL supplement CAD, hx MI / ischemic CMO EF 35-40%/ AICD / PPM  
HTN 
-+ VILLAR/worsening LE edema/ weight gain subjectively. Weight loss as indicted above - CXR (-) 
- CT wo contrast;neg for acute ASD in lungs - DDmier equivocal at 2.4 Agree Clinically low suspicious for PE (low pre-test probability)- will check DDimer as pt has failed to lay flat to ronnie V/Q scan & cannot get CT chest with Contrast (due to contrast allergy)- DDimer equivocal with clinical improvement with IV diuresis- will cease any further workup for PE at this time - With recent significant GI bleed will avoid empiric AC  
- Cont coreg and ACEi 
- Echo (12/2018): Ejection fraction was estimated in the range of 40 % to 45%. There was mild diffuse hypokinesis. Recent lower GI bleed 10/2018 (stable) 
- likely was due to diverticulosis - Hb 9.6 stable - ASA on hold since last admission due to recent hx of GIB Remote hx PUD, continue PPI 
L thyroid mass, chronic problem since childhood Hx frequent uti, continue daily cipro prophylaxis Hyperlipidemia, cont statin COPD (no longer in exacerbation), cont Pulmicort, jet nebs prn  
  
 
 
 
_______________________________________________________________________ Patient seen and examined by me on discharge day. Pertinent Findings: 
Gen:    Not in distress Chest: Clear lungs CVS:   Regular rhythm. Trace of pitting edema Abd:  Soft, not distended, not tender Neuro:  Alert, orient x 4 
_______________________________________________________________________ DISCHARGE MEDICATIONS:  
Current Discharge Medication List  
  
START taking these medications Details  
metOLazone (ZAROXOLYN) 2.5 mg tablet Take 1 Tab by mouth BID Mon Wed & Fri. 
Qty: 12 Tab, Refills: 0 CONTINUE these medications which have NOT CHANGED Details  
furosemide (LASIX) 80 mg tablet TAKE 1 TABLET BY MOUTH TWICE DAILY Qty: 180 Tab, Refills: 0  
  
potassium chloride SR (KLOR-CON 10) 10 mEq tablet Take 10 mEq by mouth daily. Patient takes 10 mEq daily and 20 mEq QHS  
  
albuterol (PROVENTIL HFA, VENTOLIN HFA, PROAIR HFA) 90 mcg/actuation inhaler Take 1-2 Puffs by inhalation every four (4) hours as needed for Wheezing or Shortness of Breath. mv-mn/folic acid/calcium/vit K (ONE-A-DAY WOMEN'S 50 PLUS PO) Take 1 Tab by mouth daily. carvedilol (COREG) 25 mg tablet TAKE 1 TABLET BY MOUTH TWICE DAILY WITH MEALS Qty: 60 Tab, Refills: 6  
  
atorvastatin (LIPITOR) 40 mg tablet TAKE 1 TABLET BY MOUTH DAILY Qty: 90 Tab, Refills: 3  
  
lisinopril (PRINIVIL, ZESTRIL) 10 mg tablet TAKE 1 TABLET BY MOUTH DAILY Qty: 90 Tab, Refills: 3 NEXIUM 40 mg capsule TAKE ONE CAPSULE BY MOUTH DAILY Qty: 30 Cap, Refills: 0  
  
budesonide (PULMICORT) 180 mcg/actuation aepb inhaler Take 2 puffs by inhalation two (2) times a day. ciprofloxacin (CIPRO) 250 mg tablet Take 125 mg by mouth daily. for UTI prophylaxis  
  
acetaminophen (TYLENOL ARTHRITIS PAIN) 650 mg CR tablet Take 650 mg by mouth every six (6) hours as needed for Pain.  
  
latanoprost (XALATAN) 0.005 % ophthalmic solution Administer 1 Drop to both eyes nightly. cetirizine (ZYRTEC) 10 mg tablet Take 10 mg by mouth daily. nitroglycerin (NITROSTAT) 0.4 mg SL tablet USE 1 TABLET UNDER THE TONGUE EVERY 5 MINUTES AS NEEDED FOR CHEST PAIN CALL 911 IF NOT RELIEVED BY 3 TABLETS Qty: 50 Tab, Refills: 0 Psyllium Husk-Sucrose (FIBER, PSYLLIUM HUSK/SUGAR,) 3.4 gram/11 gram powd Take 1 Cap by mouth daily.   
  
  
 
 
My Recommended Diet, Activity, Wound Care, and follow-up labs are listed in the patient's Discharge Insturctions which I have personally completed and reviewed. _______________________________________________________________________ DISPOSITION:   
Home with Family:   
Home with HH/PT/OT/RN: y  
SNF/LTC:   
ENRRIQUE:   
OTHER:   
 
 
Condition at Discharge:  Stable 
_______________________________________________________________________ Follow up with: PCP : Khoa Beasley MD 
Follow-up Information Follow up With Specialties Details Why Contact [address] [facility] [id number] [phone] Khoa Beasley MD [facility] Schedule an appointment as soon as possible for a visit in one week [facility] 1st floor [place] [phone] 
[phone] María Richardson MD Cardiology, [address] [place] [address] Vascular Surgery Schedule an appointment as soon as possible for a visit in 7-10 days [address] [place] [address] 
[phone] [facility], In-Home Clinical Assessments On 12/12/2018 Expect a phone call from [address] [place], [place] [address]Third Floor to schedule a follow up visit with you in 24-48 hours. If you have questions please Contact #[phone] Visit at [place] [address] [facility] [address] [place] [address] [email or web address] [place] [phone] Total time in minutes spent coordinating this discharge (includes going over instructions, follow-up, prescriptions, and preparing report for sign off to her PCP) :  30 minutes Signed: 
Irish Garduno NP

## 2018-12-11 ENCOUNTER — HOME CARE VISIT (OUTPATIENT)
Dept: HOME HEALTH SERVICES | Facility: HOME HEALTH | Age: 83
End: 2018-12-11

## 2018-12-19 PROBLEM — E78.2 MIXED HYPERLIPIDEMIA: Status: ACTIVE | Noted: 2018-01-01

## 2018-12-19 PROBLEM — E66.01 SEVERE OBESITY (HCC): Status: ACTIVE | Noted: 2018-01-01

## 2018-12-19 PROBLEM — I25.10 ATHEROSCLEROSIS OF NATIVE CORONARY ARTERY OF NATIVE HEART WITHOUT ANGINA PECTORIS: Status: ACTIVE | Noted: 2018-01-01

## 2018-12-19 PROBLEM — I48.0 PAROXYSMAL ATRIAL FIBRILLATION (HCC): Status: ACTIVE | Noted: 2018-01-01

## 2018-12-19 PROBLEM — I10 ESSENTIAL HYPERTENSION: Status: ACTIVE | Noted: 2018-01-01

## 2018-12-19 NOTE — PROGRESS NOTES
[address] [place] [address], [address] [facility]  [phone]     Subjective:      Bharath Cottrell is a 80 y.o. female is here for post hospital follow up where she was admitted 12/7/18 - 12/10/18 for acute on chronic hypoxic respiratory failure d/t acute on chronic systolic/diastolic HF. Today, she presents to clinic, stating \"she feels much better\" and breathing better. She denies chest pain/ shortness of breath, orthopnea, PND, LE edema, palpitations, syncope, or presyncope.        Patient Active Problem List    Diagnosis Date Noted    Atherosclerosis of native coronary artery of native heart without angina pectoris 12/19/2018    Mixed hyperlipidemia 12/19/2018    Essential hypertension 12/19/2018    Paroxysmal atrial fibrillation ([address] 12/19/2018    Diverticulosis 10/02/2018    Syncope 05/26/2016    S/P implantation of automatic cardioverter/defibrillator (AICD) 01/14/2015    Pulmonary HTN ([address] 18/90/0825    Systolic and diastolic CHF, acute on chronic ([address] 09/03/2014    Acute-on-chronic respiratory failure ([address] 08/27/2014    Hypothyroidism 08/27/2014    Asthma 06/12/2013    Moderate to severe pulmonary hypertension ([address] 06/12/2013    Ischemic colitis ([address] 05/14/2012    Colitis, ischemic ([address] 03/26/2012    Hypokalemia 03/23/2012    Hypomagnesemia 03/23/2012    Rectal bleeding 03/22/2012    Chronic systolic heart failure ([address] 03/08/2012    Cardiac pacemaker in situ 03/08/2012    Cardiomyopathy in other diseases classified elsewhere 03/08/2012    Chronic diastolic heart failure ([address] 03/08/2012    Atrial flutter ([address] 03/08/2012    Coronary atherosclerosis of native coronary artery 03/08/2012    Chest pain 02/25/2012    Sick sinus syndrome ([address] 02/24/2012    HTN (hypertension) 02/24/2012    Hyperlipidemia 02/24/2012      Fela Reyna MD  Past Medical History:   Diagnosis Date    Asthma     Autoimmune disease ([address]     lupus    CAD (coronary artery disease)     cardiac cath    CAD (coronary artery disease)     sick sinus syndrome    Essential hypertension     GERD (gastroesophageal reflux disease)     Glaucoma     Heart failure (HCC)     HX OTHER MEDICAL     Lupus    Hypertension     Other ill-defined conditions(799.89)     Pacemaker     PUD (peptic ulcer disease)     S/P implantation of automatic cardioverter/defibrillator (AICD) 1/14/2015    [employer] upgrade to AICD implant    SSS (sick sinus syndrome) ([address]       Past Surgical History:   Procedure Laterality Date    CARDIAC SURG PROCEDURE UNLIST      pacemaker/defibrilator.  HX GYN      BTL    HX PACEMAKER      RI COLONOSCOPY W/BIOPSY SINGLE/MULTIPLE  3/26/2012         RI COLSC FLX W/RMVL OF TUMOR POLYP LESION SNARE TQ  3/26/2012          Allergies   Allergen Reactions    Contrast Agent [Iodine] Anaphylaxis     Made aware of allergy 1/4/13    Sulfa (Sulfonamide Antibiotics) Hives    Codeine Anxiety    Pcn [Penicillins] Anaphylaxis     Reaction was to penicillin injections into buttocks.  She can take oral amoxicillin      Family History   Problem Relation Age of Onset    Arrhythmia Mother     Hypertension Mother     Hypertension Father       Social History     Socioeconomic History    Marital status: SINGLE     Spouse name: Not on file    Number of children: Not on file    Years of education: Not on file    Highest education level: Not on file   Social Needs    Financial resource strain: Not on file    Food insecurity - worry: Not on file    Food insecurity - inability: Not on file   [employer] needs - medical: Not on file   [employer] needs - non-medical: Not on file   Occupational History    Not on file   Tobacco Use    Smoking status: Never Smoker    Smokeless tobacco: Never Used   Substance and Sexual Activity    Alcohol use: No     Alcohol/week: 0.0 oz    Drug use: No    Sexual activity: Not on file   Other Topics Concern    Not on file Social History Narrative    Not on file      Current Outpatient Medications   Medication Sig    nitroglycerin (NITROSTAT) 0.4 mg SL tablet USE 1 TABLET UNDER THE TONGUE EVERY 5 MINUTES AS NEEDED FOR CHEST PAIN CALL 911 IF NOT RELIEVED BY 3 TABLETS    metOLazone (ZAROXOLYN) 2.5 mg tablet Take 1 Tab by mouth BID Mon Wed & Fri.    furosemide (LASIX) 80 mg tablet TAKE 1 TABLET BY MOUTH TWICE DAILY    potassium chloride SR (KLOR-CON 10) 10 mEq tablet Take 10 mEq by mouth daily. Patient takes 10 mEq daily and 20 mEq QHS    albuterol (PROVENTIL HFA, VENTOLIN HFA, PROAIR HFA) 90 mcg/actuation inhaler Take 1-2 Puffs by inhalation every four (4) hours as needed for Wheezing or Shortness of Breath.  Psyllium Husk-Sucrose (FIBER, PSYLLIUM HUSK/SUGAR,) 3.4 gram/11 gram powd Take 1 Cap by mouth daily.  mv-mn/folic acid/calcium/vit K (ONE-A-DAY WOMEN'S 50 PLUS PO) Take 1 Tab by mouth daily.  carvedilol (COREG) 25 mg tablet TAKE 1 TABLET BY MOUTH TWICE DAILY WITH MEALS    atorvastatin (LIPITOR) 40 mg tablet TAKE 1 TABLET BY MOUTH DAILY    lisinopril (PRINIVIL, ZESTRIL) 10 mg tablet TAKE 1 TABLET BY MOUTH DAILY    NEXIUM 40 mg capsule TAKE ONE CAPSULE BY MOUTH DAILY    budesonide (PULMICORT) 180 mcg/actuation aepb inhaler Take 2 puffs by inhalation two (2) times a day.  ciprofloxacin (CIPRO) 250 mg tablet Take 125 mg by mouth daily. for UTI prophylaxis    acetaminophen (TYLENOL ARTHRITIS PAIN) 650 mg CR tablet Take 650 mg by mouth every six (6) hours as needed for Pain.  latanoprost (XALATAN) 0.005 % ophthalmic solution Administer 1 Drop to both eyes nightly.  cetirizine (ZYRTEC) 10 mg tablet Take 10 mg by mouth daily. No current facility-administered medications for this visit.       Facility-Administered Medications Ordered in Other Visits   Medication Dose Route Frequency    ADDaptor        vancomycin (VANCOCIN) 1,000 mg injection        0.9% sodium chloride (MBP/ADV) 0.9 % infusion        bacitracin 50,000 unit injection             Review of Symptoms:  11 systems reviewed, negative other than as stated in the HPI    Physical ExamPhysical Exam:    Vitals:    12/19/18 0840   Height: 5' 1\" (1.549 m)     Body mass index is 38.03 kg/m². General PE   Gen:  NAD  Mental Status - Alert. General Appearance - Not in acute distress. Chest and Lung Exam   Inspection: Accessory muscles - No use of accessory muscles in breathing. Auscultation:   Breath sounds: - Normal.   Cardiovascular   Inspection: Jugular vein - Bilateral - Inspection Normal.   Palpation/Percussion:   Apical Impulse: - Normal.   Auscultation: Rhythm - Regular. Heart Sounds - S1 WNL and S2 WNL. No S3 or S4. Murmurs & Other Heart Sounds: Auscultation of the heart reveals - No Murmurs. Peripheral Vascular   Upper Extremity: Inspection - Bilateral - No Cyanotic nailbeds or Digital clubbing. Lower Extremity:   Palpation: Edema - Bilateral - No edema. Abdomen:   Soft, non-tender, bowel sounds are active.   Neuro: A&O times 3, CN and motor grossly WNL    Labs:   Lab Results   Component Value Date/Time    Cholesterol, total 130 02/06/2018 09:51 AM    Cholesterol, total 172 04/27/2017 08:58 AM    Cholesterol, total 124 12/12/2014 08:51 AM    Cholesterol, Total 184 01/02/2013 11:40 AM    HDL Cholesterol 37 (L) 02/06/2018 09:51 AM    HDL Cholesterol 39 (L) 04/27/2017 08:58 AM    HDL Cholesterol 45 12/12/2014 08:51 AM    HDL Cholesterol 44 01/02/2013 11:40 AM    LDL Cholesterol 115 01/02/2013 11:40 AM    LDL, calculated 76 02/06/2018 09:51 AM    LDL, calculated 112 (H) 04/27/2017 08:58 AM    LDL, calculated 64 12/12/2014 08:51 AM    Triglyceride 84 02/06/2018 09:51 AM    Triglyceride 107 04/27/2017 08:58 AM    Triglyceride 75 12/12/2014 08:51 AM    Triglycerides 163 (H) 01/02/2013 11:40 AM     Lab Results   Component Value Date/Time    CK 63 12/07/2018 01:29 PM     Lab Results   Component Value Date/Time    Sodium 138 12/10/2018 05:14 AM Potassium 3.4 (L) 12/10/2018 05:14 AM    Chloride 98 12/10/2018 05:14 AM    CO2 38 (H) 12/10/2018 05:14 AM    Anion gap 2 (L) 12/10/2018 05:14 AM    Glucose 89 12/10/2018 05:14 AM    BUN 17 12/10/2018 05:14 AM    Creatinine 0.99 12/10/2018 05:14 AM    BUN/Creatinine ratio 17 12/10/2018 05:14 AM    GFR est AA >60 12/10/2018 05:14 AM    GFR est non-AA 53 (L) 12/10/2018 05:14 AM    Calcium 8.9 12/10/2018 05:14 AM    Bilirubin, total 0.9 12/07/2018 01:29 PM    AST (SGOT) 22 12/07/2018 01:29 PM    Alk. phosphatase 89 12/07/2018 01:29 PM    Protein, total 8.6 (H) 12/07/2018 01:29 PM    Albumin 3.9 12/07/2018 01:29 PM    Globulin 4.7 (H) 12/07/2018 01:29 PM    A-G Ratio 0.8 (L) 12/07/2018 01:29 PM    ALT (SGPT) 22 12/07/2018 01:29 PM       EKG:  NSR      Assessment:     Assessment:      1. Chronic systolic heart failure ([address]    2. Mixed hyperlipidemia    3. Atherosclerosis of native coronary artery of native heart without angina pectoris    4. S/P implantation of automatic cardioverter/defibrillator (AICD)    5. Chronic diastolic heart failure (HCC)    6. Paroxysmal atrial fibrillation ([address]    7. Essential hypertension        Orders Placed This Encounter    AMB POC EKG ROUTINE W/ 12 LEADS, INTER & REP     Order Specific Question:   Reason for Exam:     Answer:   routine        Plan: This is a 80 y.o. female is here for post hospital follow up where she was admitted 12/7/18 - 12/10/18 for acute on chronic hypoxic respiratory failure d/t acute on chronic systolic/diastolic HF. Today, she presents to clinic, stating \"she feels much better\" and breathing better. Systolic and diastolic HF, acute on chronic, ICD:  EF 40-45%, mod-sev MR (which is though to be r/t volume overload), mod-severe pulm HTN per echo 12/18  Home wt has been stable between 197-199 lbs. Continue Coreg, Ace-I  Reinforced NA restrictions  Continue on Furosemide 80 mg BID, metolazone 2.5mg twice/wk.    Check labs in 2 weeks  Consider rechecking echo at f/u    ASHD  Had 60% LAD lesion 2014. Continue statin and beta-blocker. Hypertension  Controlled at home.   Continue current medication    Hyperlipidemia:   LDL 76 in February 2018 on Lipitor. Continue same.     History of sick sinus syndrome  Has pacemaker/ICD, followed regularly by Dr. Janine De Leon.    Recent lower GI bleed 10/2018 (stable)  likely was due to diverticulosis  Hb 9.6 stable  ASA on hold due to recent hx of GIB      Continue current care and f/u with Dr Isamar Jovel 6 weeks    Diana Carlisle NP

## 2018-12-19 NOTE — PROGRESS NOTES
Chief Complaint   Patient presents with   [facility] Follow Up     need refill [employer] if want to continue medication     1. Have you been to the ER, urgent care clinic since your last visit? Hospitalized since your last visit? No    2. Have you seen or consulted any other health care providers outside of the [facility] since your last visit? Include any pap smears or colon screening.  No

## 2019-01-01 ENCOUNTER — HOSPITAL ENCOUNTER (OUTPATIENT)
Age: 84
Setting detail: OBSERVATION
Discharge: HOME HEALTH | End: 2019-01-18
Attending: EMERGENCY MEDICINE | Admitting: INTERNAL MEDICINE
Payer: MEDICARE

## 2019-01-01 ENCOUNTER — OFFICE VISIT (OUTPATIENT)
Dept: CARDIOLOGY CLINIC | Age: 84
End: 2019-01-01

## 2019-01-01 ENCOUNTER — CLINICAL SUPPORT (OUTPATIENT)
Dept: CARDIOLOGY CLINIC | Age: 84
End: 2019-01-01

## 2019-01-01 ENCOUNTER — TELEPHONE (OUTPATIENT)
Dept: CARDIOLOGY CLINIC | Age: 84
End: 2019-01-01

## 2019-01-01 ENCOUNTER — HOSPITAL ENCOUNTER (EMERGENCY)
Age: 84
Discharge: HOME | End: 2019-10-08
Attending: EMERGENCY MEDICINE | Admitting: EMERGENCY MEDICINE
Payer: MEDICARE

## 2019-01-01 ENCOUNTER — HOSPITAL ENCOUNTER (EMERGENCY)
Age: 84
End: 2019-12-14
Attending: EMERGENCY MEDICINE
Payer: MEDICARE

## 2019-01-01 ENCOUNTER — APPOINTMENT (OUTPATIENT)
Dept: GENERAL RADIOLOGY | Age: 84
End: 2019-01-01
Attending: EMERGENCY MEDICINE
Payer: MEDICARE

## 2019-01-01 ENCOUNTER — APPOINTMENT (OUTPATIENT)
Dept: NON INVASIVE DIAGNOSTICS | Age: 84
End: 2019-01-01
Attending: INTERNAL MEDICINE
Payer: MEDICARE

## 2019-01-01 ENCOUNTER — HOSPITAL ENCOUNTER (OUTPATIENT)
Age: 84
Setting detail: OBSERVATION
Discharge: HOME | End: 2019-11-28
Attending: EMERGENCY MEDICINE | Admitting: INTERNAL MEDICINE
Payer: MEDICARE

## 2019-01-01 ENCOUNTER — HOSPITAL ENCOUNTER (OUTPATIENT)
Dept: NON INVASIVE DIAGNOSTICS | Age: 84
Discharge: HOME | End: 2019-11-26
Attending: NURSE PRACTITIONER
Payer: MEDICARE

## 2019-01-01 ENCOUNTER — HOSPITAL ENCOUNTER (EMERGENCY)
Age: 84
Discharge: HOME | End: 2019-11-20
Attending: EMERGENCY MEDICINE

## 2019-01-01 ENCOUNTER — APPOINTMENT (OUTPATIENT)
Dept: GENERAL RADIOLOGY | Age: 84
End: 2019-01-01
Attending: INTERNAL MEDICINE
Payer: MEDICARE

## 2019-01-01 ENCOUNTER — APPOINTMENT (OUTPATIENT)
Dept: ULTRASOUND IMAGING | Age: 84
End: 2019-01-01
Attending: EMERGENCY MEDICINE
Payer: MEDICARE

## 2019-01-01 ENCOUNTER — APPOINTMENT (OUTPATIENT)
Dept: NUCLEAR MEDICINE | Age: 84
End: 2019-01-01
Attending: NURSE PRACTITIONER
Payer: MEDICARE

## 2019-01-01 ENCOUNTER — HOSPITAL ENCOUNTER (EMERGENCY)
Age: 84
Discharge: HOME | End: 2019-10-25
Attending: EMERGENCY MEDICINE
Payer: MEDICARE

## 2019-01-01 VITALS
RESPIRATION RATE: 21 BRPM | WEIGHT: 186.9 LBS | OXYGEN SATURATION: 100 % | BODY MASS INDEX: 31.14 KG/M2 | DIASTOLIC BLOOD PRESSURE: 82 MMHG | HEART RATE: 88 BPM | HEIGHT: 65 IN | TEMPERATURE: 97.9 F | SYSTOLIC BLOOD PRESSURE: 128 MMHG

## 2019-01-01 VITALS
OXYGEN SATURATION: 99 % | RESPIRATION RATE: 25 BRPM | TEMPERATURE: 98.6 F | BODY MASS INDEX: 31.65 KG/M2 | WEIGHT: 190 LBS | HEART RATE: 88 BPM | HEIGHT: 65 IN | SYSTOLIC BLOOD PRESSURE: 147 MMHG | DIASTOLIC BLOOD PRESSURE: 88 MMHG

## 2019-01-01 VITALS
SYSTOLIC BLOOD PRESSURE: 125 MMHG | RESPIRATION RATE: 18 BRPM | DIASTOLIC BLOOD PRESSURE: 65 MMHG | BODY MASS INDEX: 32.61 KG/M2 | OXYGEN SATURATION: 99 % | HEIGHT: 65 IN | HEART RATE: 86 BPM | WEIGHT: 195.7 LBS

## 2019-01-01 VITALS
RESPIRATION RATE: 20 BRPM | TEMPERATURE: 97.9 F | WEIGHT: 208 LBS | SYSTOLIC BLOOD PRESSURE: 138 MMHG | HEART RATE: 87 BPM | OXYGEN SATURATION: 99 % | DIASTOLIC BLOOD PRESSURE: 86 MMHG | HEIGHT: 64 IN | BODY MASS INDEX: 35.51 KG/M2

## 2019-01-01 VITALS
TEMPERATURE: 99.1 F | RESPIRATION RATE: 18 BRPM | BODY MASS INDEX: 34.64 KG/M2 | HEART RATE: 70 BPM | HEIGHT: 65 IN | WEIGHT: 207.89 LBS | SYSTOLIC BLOOD PRESSURE: 129 MMHG | OXYGEN SATURATION: 98 % | DIASTOLIC BLOOD PRESSURE: 80 MMHG

## 2019-01-01 VITALS
SYSTOLIC BLOOD PRESSURE: 151 MMHG | RESPIRATION RATE: 18 BRPM | HEART RATE: 91 BPM | TEMPERATURE: 97.9 F | BODY MASS INDEX: 32.99 KG/M2 | OXYGEN SATURATION: 98 % | HEIGHT: 65 IN | DIASTOLIC BLOOD PRESSURE: 94 MMHG | WEIGHT: 198 LBS

## 2019-01-01 VITALS
BODY MASS INDEX: 32.99 KG/M2 | SYSTOLIC BLOOD PRESSURE: 124 MMHG | OXYGEN SATURATION: 99 % | RESPIRATION RATE: 16 BRPM | HEIGHT: 65 IN | DIASTOLIC BLOOD PRESSURE: 64 MMHG | WEIGHT: 198 LBS | HEART RATE: 78 BPM

## 2019-01-01 VITALS
BODY MASS INDEX: 32.99 KG/M2 | OXYGEN SATURATION: 98 % | DIASTOLIC BLOOD PRESSURE: 64 MMHG | SYSTOLIC BLOOD PRESSURE: 124 MMHG | HEART RATE: 78 BPM | WEIGHT: 198 LBS | RESPIRATION RATE: 18 BRPM | HEIGHT: 65 IN

## 2019-01-01 DIAGNOSIS — I10 ESSENTIAL HYPERTENSION: ICD-10-CM

## 2019-01-01 DIAGNOSIS — J96.11 CHRONIC RESPIRATORY FAILURE WITH HYPOXIA (HCC): ICD-10-CM

## 2019-01-01 DIAGNOSIS — R07.9 CHEST PAIN, UNSPECIFIED TYPE: Primary | ICD-10-CM

## 2019-01-01 DIAGNOSIS — Z95.810 PRESENCE OF AUTOMATIC CARDIOVERTER/DEFIBRILLATOR (AICD): Primary | ICD-10-CM

## 2019-01-01 DIAGNOSIS — I42.9 CARDIOMYOPATHY, UNSPECIFIED TYPE (HCC): ICD-10-CM

## 2019-01-01 DIAGNOSIS — I48.0 PAROXYSMAL ATRIAL FIBRILLATION (HCC): ICD-10-CM

## 2019-01-01 DIAGNOSIS — I46.9 CARDIAC ARREST (HCC): Primary | ICD-10-CM

## 2019-01-01 DIAGNOSIS — I50.22 CHRONIC SYSTOLIC HEART FAILURE (HCC): ICD-10-CM

## 2019-01-01 DIAGNOSIS — R04.0 EPISTAXIS: Primary | ICD-10-CM

## 2019-01-01 DIAGNOSIS — E78.2 MIXED HYPERLIPIDEMIA: ICD-10-CM

## 2019-01-01 DIAGNOSIS — Z95.810 S/P IMPLANTATION OF AUTOMATIC CARDIOVERTER/DEFIBRILLATOR (AICD): ICD-10-CM

## 2019-01-01 DIAGNOSIS — I49.5 SICK SINUS SYNDROME (HCC): ICD-10-CM

## 2019-01-01 DIAGNOSIS — I50.32 CHRONIC DIASTOLIC HEART FAILURE (HCC): Primary | ICD-10-CM

## 2019-01-01 DIAGNOSIS — I25.10 CORONARY ARTERY DISEASE INVOLVING NATIVE CORONARY ARTERY OF NATIVE HEART WITHOUT ANGINA PECTORIS: ICD-10-CM

## 2019-01-01 DIAGNOSIS — I50.9 ACUTE ON CHRONIC CONGESTIVE HEART FAILURE, UNSPECIFIED HEART FAILURE TYPE (HCC): Primary | ICD-10-CM

## 2019-01-01 DIAGNOSIS — Z79.01 CHRONIC ANTICOAGULATION: ICD-10-CM

## 2019-01-01 DIAGNOSIS — Z95.810 S/P IMPLANTATION OF AUTOMATIC CARDIOVERTER/DEFIBRILLATOR (AICD): Primary | ICD-10-CM

## 2019-01-01 DIAGNOSIS — I50.22 CHRONIC SYSTOLIC HEART FAILURE (HCC): Primary | ICD-10-CM

## 2019-01-01 DIAGNOSIS — I50.43 SYSTOLIC AND DIASTOLIC CHF, ACUTE ON CHRONIC (HCC): ICD-10-CM

## 2019-01-01 DIAGNOSIS — R04.0 LEFT-SIDED EPISTAXIS: Primary | ICD-10-CM

## 2019-01-01 DIAGNOSIS — I49.9 IRREGULAR HEARTBEAT: Primary | ICD-10-CM

## 2019-01-01 DIAGNOSIS — N17.9 ACUTE RENAL FAILURE, UNSPECIFIED ACUTE RENAL FAILURE TYPE (HCC): Primary | ICD-10-CM

## 2019-01-01 DIAGNOSIS — R77.8 ELEVATED TROPONIN: ICD-10-CM

## 2019-01-01 DIAGNOSIS — I50.32 CHRONIC DIASTOLIC HEART FAILURE (HCC): ICD-10-CM

## 2019-01-01 DIAGNOSIS — I10 ACCELERATED HYPERTENSION: ICD-10-CM

## 2019-01-01 LAB
ALBUMIN SERPL-MCNC: 3.2 G/DL (ref 3.5–5)
ALBUMIN SERPL-MCNC: 3.2 G/DL (ref 3.5–5)
ALBUMIN SERPL-MCNC: 3.3 G/DL (ref 3.5–5)
ALBUMIN SERPL-MCNC: 3.5 G/DL (ref 3.5–5)
ALBUMIN SERPL-MCNC: 3.5 G/DL (ref 3.5–5)
ALBUMIN SERPL-MCNC: 3.7 G/DL (ref 3.5–5)
ALBUMIN SERPL-MCNC: 3.8 G/DL (ref 3.5–5)
ALBUMIN/GLOB SERPL: 0.7 {RATIO} (ref 1.1–2.2)
ALBUMIN/GLOB SERPL: 0.8 {RATIO} (ref 1.1–2.2)
ALBUMIN/GLOB SERPL: 0.9 {RATIO} (ref 1.1–2.2)
ALBUMIN/GLOB SERPL: 0.9 {RATIO} (ref 1.1–2.2)
ALP SERPL-CCNC: 63 U/L (ref 45–117)
ALP SERPL-CCNC: 69 U/L (ref 45–117)
ALP SERPL-CCNC: 69 U/L (ref 45–117)
ALP SERPL-CCNC: 70 U/L (ref 45–117)
ALP SERPL-CCNC: 72 U/L (ref 45–117)
ALP SERPL-CCNC: 73 U/L (ref 45–117)
ALP SERPL-CCNC: 77 U/L (ref 45–117)
ALT SERPL-CCNC: 12 U/L (ref 12–78)
ALT SERPL-CCNC: 15 U/L (ref 12–78)
ALT SERPL-CCNC: 16 U/L (ref 12–78)
ALT SERPL-CCNC: 16 U/L (ref 12–78)
ALT SERPL-CCNC: 17 U/L (ref 12–78)
ALT SERPL-CCNC: 17 U/L (ref 12–78)
ALT SERPL-CCNC: 18 U/L (ref 12–78)
ANION GAP BLD CALC-SCNC: 15 MMOL/L (ref 10–20)
ANION GAP SERPL CALC-SCNC: 2 MMOL/L (ref 5–15)
ANION GAP SERPL CALC-SCNC: 3 MMOL/L (ref 5–15)
ANION GAP SERPL CALC-SCNC: 5 MMOL/L (ref 5–15)
ANION GAP SERPL CALC-SCNC: 6 MMOL/L (ref 5–15)
ANION GAP SERPL CALC-SCNC: 7 MMOL/L (ref 5–15)
ANION GAP SERPL CALC-SCNC: 7 MMOL/L (ref 5–15)
APPEARANCE UR: CLEAR
APPEARANCE UR: CLEAR
AST SERPL-CCNC: 19 U/L (ref 15–37)
AST SERPL-CCNC: 19 U/L (ref 15–37)
AST SERPL-CCNC: 20 U/L (ref 15–37)
AST SERPL-CCNC: 20 U/L (ref 15–37)
AST SERPL-CCNC: 22 U/L (ref 15–37)
AST SERPL-CCNC: 22 U/L (ref 15–37)
AST SERPL-CCNC: 29 U/L (ref 15–37)
ATRIAL RATE: 65 BPM
ATRIAL RATE: 74 BPM
ATRIAL RATE: 94 BPM
AV VELOCITY RATIO: 0.64
BACTERIA SPEC CULT: NORMAL
BACTERIA URNS QL MICRO: ABNORMAL /HPF
BACTERIA URNS QL MICRO: NEGATIVE /HPF
BASOPHILS # BLD: 0 K/UL (ref 0–0.1)
BASOPHILS # BLD: 0.1 K/UL (ref 0–0.1)
BASOPHILS # BLD: 0.1 K/UL (ref 0–0.1)
BASOPHILS NFR BLD: 1 % (ref 0–1)
BILIRUB SERPL-MCNC: 0.8 MG/DL (ref 0.2–1)
BILIRUB SERPL-MCNC: 0.8 MG/DL (ref 0.2–1)
BILIRUB SERPL-MCNC: 1 MG/DL (ref 0.2–1)
BILIRUB SERPL-MCNC: 1.1 MG/DL (ref 0.2–1)
BILIRUB SERPL-MCNC: 1.3 MG/DL (ref 0.2–1)
BILIRUB UR QL: NEGATIVE
BILIRUB UR QL: NEGATIVE
BNP SERPL-MCNC: 215.2 PG/ML (ref 0–100)
BNP SERPL-MCNC: 4212 PG/ML
BNP SERPL-MCNC: 426 PG/ML (ref 0–450)
BNP SERPL-MCNC: 4628 PG/ML
BUN BLD-MCNC: 24 MG/DL (ref 9–20)
BUN SERPL-MCNC: 11 MG/DL (ref 8–27)
BUN SERPL-MCNC: 22 MG/DL (ref 8–27)
BUN SERPL-MCNC: 25 MG/DL (ref 6–20)
BUN SERPL-MCNC: 28 MG/DL (ref 6–20)
BUN SERPL-MCNC: 30 MG/DL (ref 6–20)
BUN SERPL-MCNC: 34 MG/DL (ref 6–20)
BUN SERPL-MCNC: 63 MG/DL (ref 6–20)
BUN SERPL-MCNC: 79 MG/DL (ref 6–20)
BUN/CREAT SERPL: 10 (ref 12–28)
BUN/CREAT SERPL: 18 (ref 12–20)
BUN/CREAT SERPL: 18 (ref 12–20)
BUN/CREAT SERPL: 19 (ref 12–28)
BUN/CREAT SERPL: 20 (ref 12–20)
BUN/CREAT SERPL: 20 (ref 12–20)
BUN/CREAT SERPL: 23 (ref 12–20)
BUN/CREAT SERPL: 24 (ref 12–20)
BUN/CREAT SERPL: 31 (ref 12–20)
BUN/CREAT SERPL: 40 (ref 12–20)
CA-I BLD-MCNC: 1.12 MMOL/L (ref 1.12–1.32)
CALCIUM SERPL-MCNC: 8.4 MG/DL (ref 8.5–10.1)
CALCIUM SERPL-MCNC: 8.4 MG/DL (ref 8.5–10.1)
CALCIUM SERPL-MCNC: 8.5 MG/DL (ref 8.5–10.1)
CALCIUM SERPL-MCNC: 8.5 MG/DL (ref 8.5–10.1)
CALCIUM SERPL-MCNC: 8.7 MG/DL (ref 8.5–10.1)
CALCIUM SERPL-MCNC: 8.8 MG/DL (ref 8.5–10.1)
CALCIUM SERPL-MCNC: 8.9 MG/DL (ref 8.5–10.1)
CALCIUM SERPL-MCNC: 9 MG/DL (ref 8.7–10.3)
CALCIUM SERPL-MCNC: 9 MG/DL (ref 8.7–10.3)
CALCIUM SERPL-MCNC: 9.1 MG/DL (ref 8.5–10.1)
CALCULATED P AXIS, ECG09: 25 DEGREES
CALCULATED P AXIS, ECG09: 41 DEGREES
CALCULATED P AXIS, ECG09: 44 DEGREES
CALCULATED R AXIS, ECG10: -25 DEGREES
CALCULATED R AXIS, ECG10: -40 DEGREES
CALCULATED R AXIS, ECG10: -41 DEGREES
CALCULATED T AXIS, ECG11: 15 DEGREES
CALCULATED T AXIS, ECG11: 38 DEGREES
CALCULATED T AXIS, ECG11: 52 DEGREES
CC UR VC: NORMAL
CHLORIDE BLD-SCNC: 103 MMOL/L (ref 98–107)
CHLORIDE SERPL-SCNC: 100 MMOL/L (ref 97–108)
CHLORIDE SERPL-SCNC: 100 MMOL/L (ref 97–108)
CHLORIDE SERPL-SCNC: 101 MMOL/L (ref 97–108)
CHLORIDE SERPL-SCNC: 103 MMOL/L (ref 97–108)
CHLORIDE SERPL-SCNC: 93 MMOL/L (ref 97–108)
CHLORIDE SERPL-SCNC: 94 MMOL/L (ref 97–108)
CHLORIDE SERPL-SCNC: 95 MMOL/L (ref 96–106)
CHLORIDE SERPL-SCNC: 97 MMOL/L (ref 96–106)
CHLORIDE SERPL-SCNC: 98 MMOL/L (ref 97–108)
CHLORIDE SERPL-SCNC: 99 MMOL/L (ref 97–108)
CK SERPL-CCNC: 63 U/L (ref 26–192)
CK SERPL-CCNC: 64 U/L (ref 26–192)
CO2 BLD-SCNC: 26 MMOL/L (ref 21–32)
CO2 SERPL-SCNC: 30 MMOL/L (ref 20–29)
CO2 SERPL-SCNC: 33 MMOL/L (ref 21–32)
CO2 SERPL-SCNC: 34 MMOL/L (ref 20–29)
CO2 SERPL-SCNC: 34 MMOL/L (ref 21–32)
CO2 SERPL-SCNC: 34 MMOL/L (ref 21–32)
CO2 SERPL-SCNC: 35 MMOL/L (ref 21–32)
CO2 SERPL-SCNC: 36 MMOL/L (ref 21–32)
CO2 SERPL-SCNC: 36 MMOL/L (ref 21–32)
COLLECT DURATION TIME UR: 24 HR
COLOR UR: ABNORMAL
COLOR UR: NORMAL
COMMENT, HOLDF: NORMAL
CREAT 24H UR-MRATE: 857 MG/24HR (ref 600–1800)
CREAT BLD-MCNC: 1.5 MG/DL (ref 0.6–1.3)
CREAT SERPL-MCNC: 1.11 MG/DL (ref 0.57–1)
CREAT SERPL-MCNC: 1.13 MG/DL (ref 0.57–1)
CREAT SERPL-MCNC: 1.3 MG/DL (ref 0.55–1.02)
CREAT SERPL-MCNC: 1.38 MG/DL (ref 0.55–1.02)
CREAT SERPL-MCNC: 1.4 MG/DL (ref 0.55–1.02)
CREAT SERPL-MCNC: 1.54 MG/DL (ref 0.55–1.02)
CREAT SERPL-MCNC: 1.56 MG/DL (ref 0.55–1.02)
CREAT SERPL-MCNC: 2.58 MG/DL (ref 0.55–1.02)
DIAGNOSIS, 93000: NORMAL
DIFFERENTIAL METHOD BLD: ABNORMAL
ECHO AO ROOT DIAM: 2.96 CM
ECHO AV AREA PEAK VELOCITY: 2 CM2
ECHO AV AREA/BSA PEAK VELOCITY: 1 CM2/M2
ECHO AV CUSP MM: 1.62 CM
ECHO AV PEAK GRADIENT: 8.6 MMHG
ECHO AV PEAK VELOCITY: 146.74 CM/S
ECHO LA MAJOR AXIS: 4.39 CM
ECHO LA TO AORTIC ROOT RATIO: 1.48
ECHO LV E' LATERAL VELOCITY: 7.14 CM/S
ECHO LV EDV TEICHHOLZ: 0.48 ML
ECHO LV ESV TEICHHOLZ: 0.17 ML
ECHO LV INTERNAL DIMENSION DIASTOLIC: 4.22 CM (ref 3.9–5.3)
ECHO LV INTERNAL DIMENSION SYSTOLIC: 2.75 CM
ECHO LV IVSD: 1.1 CM (ref 0.6–0.9)
ECHO LV IVSS: 1.49 CM
ECHO LV MASS 2D: 181.8 G (ref 67–162)
ECHO LV MASS INDEX 2D: 93.1 G/M2 (ref 43–95)
ECHO LV POSTERIOR WALL DIASTOLIC: 1.09 CM (ref 0.6–0.9)
ECHO LV POSTERIOR WALL SYSTOLIC: 1.54 CM
ECHO LVOT DIAM: 2.01 CM
ECHO LVOT PEAK GRADIENT: 3.5 MMHG
ECHO LVOT PEAK VELOCITY: 93.78 CM/S
ECHO MV A VELOCITY: 107.31 CM/S
ECHO MV E DECELERATION TIME (DT): 198.7 MS
ECHO MV E VELOCITY: 78.57 CM/S
ECHO MV E/A RATIO: 0.73
ECHO MV E/E' LATERAL: 11
ECHO MV REGURGITANT PEAK GRADIENT: 96.7 MMHG
ECHO MV REGURGITANT PEAK VELOCITY: 491.58 CM/S
ECHO PV MAX VELOCITY: 107.02 CM/S
ECHO PV PEAK GRADIENT: 4.6 MMHG
ECHO RV INTERNAL DIMENSION: 4.8 CM
ECHO TV REGURGITANT MAX VELOCITY: 326.87 CM/S
ECHO TV REGURGITANT PEAK GRADIENT: 42.7 MMHG
EOSINOPHIL # BLD: 0.1 K/UL (ref 0–0.4)
EOSINOPHIL # BLD: 0.2 K/UL (ref 0–0.4)
EOSINOPHIL # BLD: 0.3 K/UL (ref 0–0.4)
EOSINOPHIL NFR BLD: 2 % (ref 0–7)
EOSINOPHIL NFR BLD: 3 % (ref 0–7)
EOSINOPHIL NFR BLD: 4 % (ref 0–7)
EPITH CASTS URNS QL MICRO: ABNORMAL /LPF
EPITH CASTS URNS QL MICRO: NORMAL /LPF
ERYTHROCYTE [DISTWIDTH] IN BLOOD BY AUTOMATED COUNT: 13.3 % (ref 11.5–14.5)
ERYTHROCYTE [DISTWIDTH] IN BLOOD BY AUTOMATED COUNT: 13.3 % (ref 11.5–14.5)
ERYTHROCYTE [DISTWIDTH] IN BLOOD BY AUTOMATED COUNT: 13.5 % (ref 11.5–14.5)
ERYTHROCYTE [DISTWIDTH] IN BLOOD BY AUTOMATED COUNT: 13.8 % (ref 11.5–14.5)
ERYTHROCYTE [DISTWIDTH] IN BLOOD BY AUTOMATED COUNT: 13.9 % (ref 11.5–14.5)
ERYTHROCYTE [DISTWIDTH] IN BLOOD BY AUTOMATED COUNT: 14 % (ref 11.5–14.5)
ERYTHROCYTE [DISTWIDTH] IN BLOOD BY AUTOMATED COUNT: 14.1 % (ref 11.5–14.5)
EST. AVERAGE GLUCOSE BLD GHB EST-MCNC: 111 MG/DL
GLOBULIN SER CALC-MCNC: 4 G/DL (ref 2–4)
GLOBULIN SER CALC-MCNC: 4.1 G/DL (ref 2–4)
GLOBULIN SER CALC-MCNC: 4.3 G/DL (ref 2–4)
GLOBULIN SER CALC-MCNC: 4.3 G/DL (ref 2–4)
GLOBULIN SER CALC-MCNC: 4.4 G/DL (ref 2–4)
GLOBULIN SER CALC-MCNC: 4.5 G/DL (ref 2–4)
GLOBULIN SER CALC-MCNC: 4.7 G/DL (ref 2–4)
GLUCOSE BLD STRIP.AUTO-MCNC: 174 MG/DL (ref 65–100)
GLUCOSE BLD-MCNC: 244 MG/DL (ref 65–100)
GLUCOSE SERPL-MCNC: 101 MG/DL (ref 65–99)
GLUCOSE SERPL-MCNC: 103 MG/DL (ref 65–100)
GLUCOSE SERPL-MCNC: 118 MG/DL (ref 65–100)
GLUCOSE SERPL-MCNC: 153 MG/DL (ref 65–100)
GLUCOSE SERPL-MCNC: 92 MG/DL (ref 65–100)
GLUCOSE SERPL-MCNC: 92 MG/DL (ref 65–99)
GLUCOSE SERPL-MCNC: 94 MG/DL (ref 65–100)
GLUCOSE SERPL-MCNC: 98 MG/DL (ref 65–100)
GLUCOSE UR STRIP.AUTO-MCNC: NEGATIVE MG/DL
GLUCOSE UR STRIP.AUTO-MCNC: NEGATIVE MG/DL
HBA1C MFR BLD: 5.5 % (ref 4–5.6)
HCT VFR BLD AUTO: 27 % (ref 35–47)
HCT VFR BLD AUTO: 29.7 % (ref 35–47)
HCT VFR BLD AUTO: 31.8 % (ref 35–47)
HCT VFR BLD AUTO: 32 % (ref 35–47)
HCT VFR BLD AUTO: 33.6 % (ref 35–47)
HCT VFR BLD AUTO: 34.9 % (ref 35–47)
HCT VFR BLD AUTO: 35.5 % (ref 35–47)
HCT VFR BLD CALC: 35 % (ref 35–47)
HGB BLD-MCNC: 10.3 G/DL (ref 11.5–16)
HGB BLD-MCNC: 10.4 G/DL (ref 11.5–16)
HGB BLD-MCNC: 8.3 G/DL (ref 11.5–16)
HGB BLD-MCNC: 8.9 G/DL (ref 11.5–16)
HGB BLD-MCNC: 9.5 G/DL (ref 11.5–16)
HGB BLD-MCNC: 9.6 G/DL (ref 11.5–16)
HGB BLD-MCNC: 9.8 G/DL (ref 11.5–16)
HGB UR QL STRIP: ABNORMAL
HGB UR QL STRIP: NEGATIVE
HYALINE CASTS URNS QL MICRO: NORMAL /LPF (ref 0–5)
IMM GRANULOCYTES # BLD AUTO: 0 K/UL (ref 0–0.04)
IMM GRANULOCYTES # BLD AUTO: 0.1 K/UL (ref 0–0.04)
IMM GRANULOCYTES NFR BLD AUTO: 0 % (ref 0–0.5)
IMM GRANULOCYTES NFR BLD AUTO: 1 % (ref 0–0.5)
IMM GRANULOCYTES NFR BLD AUTO: 1 % (ref 0–0.5)
INR PPP: 1.2 (ref 0.9–1.1)
INTERPRETATION: NORMAL
INTERPRETATION: NORMAL
KETONES UR QL STRIP.AUTO: NEGATIVE MG/DL
KETONES UR QL STRIP.AUTO: NEGATIVE MG/DL
LEUKOCYTE ESTERASE UR QL STRIP.AUTO: ABNORMAL
LEUKOCYTE ESTERASE UR QL STRIP.AUTO: NEGATIVE
LIPASE SERPL-CCNC: 410 U/L (ref 73–393)
LVFS 2D: 34.74 %
LVSV (TEICH): 25.2 ML
LYMPHOCYTES # BLD: 0.9 K/UL (ref 0.8–3.5)
LYMPHOCYTES # BLD: 1.1 K/UL (ref 0.8–3.5)
LYMPHOCYTES # BLD: 1.3 K/UL (ref 0.8–3.5)
LYMPHOCYTES NFR BLD: 14 % (ref 12–49)
LYMPHOCYTES NFR BLD: 14 % (ref 12–49)
LYMPHOCYTES NFR BLD: 16 % (ref 12–49)
LYMPHOCYTES NFR BLD: 16 % (ref 12–49)
LYMPHOCYTES NFR BLD: 20 % (ref 12–49)
MAGNESIUM SERPL-MCNC: 2 MG/DL (ref 1.6–2.3)
MAGNESIUM SERPL-MCNC: 2 MG/DL (ref 1.6–2.3)
MAGNESIUM SERPL-MCNC: 2.1 MG/DL (ref 1.6–2.4)
MAGNESIUM SERPL-MCNC: 2.3 MG/DL (ref 1.6–2.4)
MCH RBC QN AUTO: 26.3 PG (ref 26–34)
MCH RBC QN AUTO: 26.9 PG (ref 26–34)
MCH RBC QN AUTO: 27 PG (ref 26–34)
MCH RBC QN AUTO: 27.1 PG (ref 26–34)
MCH RBC QN AUTO: 27.3 PG (ref 26–34)
MCH RBC QN AUTO: 27.5 PG (ref 26–34)
MCH RBC QN AUTO: 27.7 PG (ref 26–34)
MCHC RBC AUTO-ENTMCNC: 29 G/DL (ref 30–36.5)
MCHC RBC AUTO-ENTMCNC: 29.2 G/DL (ref 30–36.5)
MCHC RBC AUTO-ENTMCNC: 29.7 G/DL (ref 30–36.5)
MCHC RBC AUTO-ENTMCNC: 29.8 G/DL (ref 30–36.5)
MCHC RBC AUTO-ENTMCNC: 30 G/DL (ref 30–36.5)
MCHC RBC AUTO-ENTMCNC: 30.2 G/DL (ref 30–36.5)
MCHC RBC AUTO-ENTMCNC: 30.7 G/DL (ref 30–36.5)
MCV RBC AUTO: 87.7 FL (ref 80–99)
MCV RBC AUTO: 87.9 FL (ref 80–99)
MCV RBC AUTO: 91.1 FL (ref 80–99)
MCV RBC AUTO: 91.2 FL (ref 80–99)
MCV RBC AUTO: 92.8 FL (ref 80–99)
MCV RBC AUTO: 93.2 FL (ref 80–99)
MCV RBC AUTO: 93.6 FL (ref 80–99)
MICROALBUMIN 24H UR-MRATE: 9 MG/24HR
MICROALBUMIN/CREAT 24H UR: 11 MG/G (ref 0–30)
MONOCYTES # BLD: 0.7 K/UL (ref 0–1)
MONOCYTES # BLD: 0.7 K/UL (ref 0–1)
MONOCYTES # BLD: 1 K/UL (ref 0–1)
MONOCYTES NFR BLD: 12 % (ref 5–13)
MONOCYTES NFR BLD: 13 % (ref 5–13)
MONOCYTES NFR BLD: 13 % (ref 5–13)
MONOCYTES NFR BLD: 15 % (ref 5–13)
MONOCYTES NFR BLD: 16 % (ref 5–13)
MV DEC SLOPE: 4.07
NEUTS SEG # BLD: 4 K/UL (ref 1.8–8)
NEUTS SEG # BLD: 4 K/UL (ref 1.8–8)
NEUTS SEG # BLD: 4.3 K/UL (ref 1.8–8)
NEUTS SEG # BLD: 4.3 K/UL (ref 1.8–8)
NEUTS SEG # BLD: 5.3 K/UL (ref 1.8–8)
NEUTS SEG NFR BLD: 61 % (ref 32–75)
NEUTS SEG NFR BLD: 66 % (ref 32–75)
NEUTS SEG NFR BLD: 67 % (ref 32–75)
NEUTS SEG NFR BLD: 68 % (ref 32–75)
NEUTS SEG NFR BLD: 69 % (ref 32–75)
NITRITE UR QL STRIP.AUTO: NEGATIVE
NITRITE UR QL STRIP.AUTO: POSITIVE
NRBC # BLD: 0 K/UL (ref 0–0.01)
NRBC BLD-RTO: 0 PER 100 WBC
OTHER,OTHU: ABNORMAL
P-R INTERVAL, ECG05: 158 MS
P-R INTERVAL, ECG05: 176 MS
P-R INTERVAL, ECG05: 198 MS
PH UR STRIP: 6 [PH] (ref 5–8)
PH UR STRIP: 7.5 [PH] (ref 5–8)
PLATELET # BLD AUTO: 241 K/UL (ref 150–400)
PLATELET # BLD AUTO: 248 K/UL (ref 150–400)
PLATELET # BLD AUTO: 265 K/UL (ref 150–400)
PLATELET # BLD AUTO: 292 K/UL (ref 150–400)
PLATELET # BLD AUTO: 300 K/UL (ref 150–400)
PLATELET # BLD AUTO: 301 K/UL (ref 150–400)
PLATELET # BLD AUTO: 304 K/UL (ref 150–400)
PMV BLD AUTO: 9 FL (ref 8.9–12.9)
PMV BLD AUTO: 9.2 FL (ref 8.9–12.9)
PMV BLD AUTO: 9.2 FL (ref 8.9–12.9)
PMV BLD AUTO: 9.5 FL (ref 8.9–12.9)
PMV BLD AUTO: 9.7 FL (ref 8.9–12.9)
POTASSIUM BLD-SCNC: 4.5 MMOL/L (ref 3.5–5.1)
POTASSIUM SERPL-SCNC: 3.2 MMOL/L (ref 3.5–5.1)
POTASSIUM SERPL-SCNC: 3.4 MMOL/L (ref 3.5–5.1)
POTASSIUM SERPL-SCNC: 3.4 MMOL/L (ref 3.5–5.2)
POTASSIUM SERPL-SCNC: 3.7 MMOL/L (ref 3.5–5.1)
POTASSIUM SERPL-SCNC: 3.7 MMOL/L (ref 3.5–5.1)
POTASSIUM SERPL-SCNC: 3.8 MMOL/L (ref 3.5–5.1)
POTASSIUM SERPL-SCNC: 3.8 MMOL/L (ref 3.5–5.2)
POTASSIUM SERPL-SCNC: 4.1 MMOL/L (ref 3.5–5.1)
POTASSIUM SERPL-SCNC: 4.1 MMOL/L (ref 3.5–5.1)
POTASSIUM SERPL-SCNC: 4.3 MMOL/L (ref 3.5–5.1)
POTASSIUM UR-SCNC: 73 MMOL/L
PROT SERPL-MCNC: 7.2 G/DL (ref 6.4–8.2)
PROT SERPL-MCNC: 7.5 G/DL (ref 6.4–8.2)
PROT SERPL-MCNC: 7.6 G/DL (ref 6.4–8.2)
PROT SERPL-MCNC: 7.7 G/DL (ref 6.4–8.2)
PROT SERPL-MCNC: 8 G/DL (ref 6.4–8.2)
PROT SERPL-MCNC: 8 G/DL (ref 6.4–8.2)
PROT SERPL-MCNC: 8.5 G/DL (ref 6.4–8.2)
PROT UR STRIP-MCNC: 30 MG/DL
PROT UR STRIP-MCNC: NEGATIVE MG/DL
PROTHROMBIN TIME: 12.3 SEC (ref 9–11.1)
Q-T INTERVAL, ECG07: 402 MS
Q-T INTERVAL, ECG07: 430 MS
Q-T INTERVAL, ECG07: 476 MS
QRS DURATION, ECG06: 116 MS
QRS DURATION, ECG06: 124 MS
QRS DURATION, ECG06: 138 MS
QTC CALCULATION (BEZET), ECG08: 477 MS
QTC CALCULATION (BEZET), ECG08: 495 MS
QTC CALCULATION (BEZET), ECG08: 502 MS
RBC # BLD AUTO: 3.08 M/UL (ref 3.8–5.2)
RBC # BLD AUTO: 3.38 M/UL (ref 3.8–5.2)
RBC # BLD AUTO: 3.49 M/UL (ref 3.8–5.2)
RBC # BLD AUTO: 3.51 M/UL (ref 3.8–5.2)
RBC # BLD AUTO: 3.59 M/UL (ref 3.8–5.2)
RBC # BLD AUTO: 3.76 M/UL (ref 3.8–5.2)
RBC # BLD AUTO: 3.81 M/UL (ref 3.8–5.2)
RBC #/AREA URNS HPF: ABNORMAL /HPF (ref 0–5)
RBC #/AREA URNS HPF: NORMAL /HPF (ref 0–5)
SAMPLES BEING HELD,HOLD: NORMAL
SERVICE CMNT-IMP: ABNORMAL
SERVICE CMNT-IMP: ABNORMAL
SERVICE CMNT-IMP: NORMAL
SODIUM BLD-SCNC: 137 MMOL/L (ref 136–145)
SODIUM SERPL-SCNC: 135 MMOL/L (ref 136–145)
SODIUM SERPL-SCNC: 136 MMOL/L (ref 136–145)
SODIUM SERPL-SCNC: 136 MMOL/L (ref 136–145)
SODIUM SERPL-SCNC: 137 MMOL/L (ref 136–145)
SODIUM SERPL-SCNC: 138 MMOL/L (ref 136–145)
SODIUM SERPL-SCNC: 139 MMOL/L (ref 136–145)
SODIUM SERPL-SCNC: 140 MMOL/L (ref 134–144)
SODIUM SERPL-SCNC: 140 MMOL/L (ref 136–145)
SODIUM SERPL-SCNC: 141 MMOL/L (ref 136–145)
SODIUM SERPL-SCNC: 142 MMOL/L (ref 134–144)
SODIUM UR-SCNC: 44 MMOL/L
SP GR UR REFRACTOMETRY: 1.01 (ref 1–1.03)
SP GR UR REFRACTOMETRY: 1.01 (ref 1–1.03)
SPECIMEN VOL ?TM UR: 1200 ML
STRESS BASELINE DIAS BP: 83 MMHG
STRESS BASELINE HR: 81 BPM
STRESS BASELINE SYS BP: 136 MMHG
STRESS ESTIMATED WORKLOAD: 1 METS
STRESS EXERCISE DUR MIN: NORMAL
STRESS O2 SAT REST: 99 %
STRESS PEAK DIAS BP: 87 MMHG
STRESS PEAK SYS BP: 139 MMHG
STRESS PERCENT HR ACHIEVED: 81 %
STRESS POST PEAK HR: 110 BPM
STRESS RATE PRESSURE PRODUCT: NORMAL BPM*MMHG
STRESS ST DEPRESSION: 0 MM
STRESS ST ELEVATION: 0 MM
STRESS TARGET HR: 135 BPM
TROPONIN I BLD-MCNC: 0.13 NG/ML (ref 0–0.08)
TROPONIN I BLD-MCNC: <0.04 NG/ML (ref 0–0.08)
TROPONIN I SERPL-MCNC: 0.1 NG/ML
TROPONIN I SERPL-MCNC: 0.14 NG/ML
TROPONIN I SERPL-MCNC: 0.19 NG/ML
TROPONIN I SERPL-MCNC: <0.05 NG/ML
TROPONIN I SERPL-MCNC: <0.05 NG/ML
TSH SERPL DL<=0.05 MIU/L-ACNC: 1.43 UIU/ML (ref 0.36–3.74)
UA: UC IF INDICATED,UAUC: ABNORMAL
UROBILINOGEN UR QL STRIP.AUTO: 1 EU/DL (ref 0.2–1)
UROBILINOGEN UR QL STRIP.AUTO: 1 EU/DL (ref 0.2–1)
VENTRICULAR RATE, ECG03: 65 BPM
VENTRICULAR RATE, ECG03: 74 BPM
VENTRICULAR RATE, ECG03: 94 BPM
WBC # BLD AUTO: 5.8 K/UL (ref 3.6–11)
WBC # BLD AUTO: 5.8 K/UL (ref 3.6–11)
WBC # BLD AUTO: 6.5 K/UL (ref 3.6–11)
WBC # BLD AUTO: 6.5 K/UL (ref 3.6–11)
WBC # BLD AUTO: 6.9 K/UL (ref 3.6–11)
WBC # BLD AUTO: 7.4 K/UL (ref 3.6–11)
WBC # BLD AUTO: 7.8 K/UL (ref 3.6–11)
WBC URNS QL MICRO: ABNORMAL /HPF (ref 0–4)
WBC URNS QL MICRO: NORMAL /HPF (ref 0–4)
YEAST URNS QL MICRO: PRESENT

## 2019-01-01 PROCEDURE — 80053 COMPREHEN METABOLIC PANEL: CPT

## 2019-01-01 PROCEDURE — 97116 GAIT TRAINING THERAPY: CPT

## 2019-01-01 PROCEDURE — 74011250637 HC RX REV CODE- 250/637: Performed by: NURSE PRACTITIONER

## 2019-01-01 PROCEDURE — 71046 X-RAY EXAM CHEST 2 VIEWS: CPT

## 2019-01-01 PROCEDURE — 36415 COLL VENOUS BLD VENIPUNCTURE: CPT

## 2019-01-01 PROCEDURE — 85025 COMPLETE CBC W/AUTO DIFF WBC: CPT

## 2019-01-01 PROCEDURE — 74011250636 HC RX REV CODE- 250/636: Performed by: NURSE PRACTITIONER

## 2019-01-01 PROCEDURE — 77010033678 HC OXYGEN DAILY

## 2019-01-01 PROCEDURE — 83880 ASSAY OF NATRIURETIC PEPTIDE: CPT

## 2019-01-01 PROCEDURE — 81001 URINALYSIS AUTO W/SCOPE: CPT

## 2019-01-01 PROCEDURE — 96372 THER/PROPH/DIAG INJ SC/IM: CPT

## 2019-01-01 PROCEDURE — 85610 PROTHROMBIN TIME: CPT

## 2019-01-01 PROCEDURE — 84443 ASSAY THYROID STIM HORMONE: CPT

## 2019-01-01 PROCEDURE — 99218 HC RM OBSERVATION: CPT

## 2019-01-01 PROCEDURE — 74011250637 HC RX REV CODE- 250/637: Performed by: INTERNAL MEDICINE

## 2019-01-01 PROCEDURE — 99285 EMERGENCY DEPT VISIT HI MDM: CPT

## 2019-01-01 PROCEDURE — 97535 SELF CARE MNGMENT TRAINING: CPT | Performed by: OCCUPATIONAL THERAPIST

## 2019-01-01 PROCEDURE — 74011250637 HC RX REV CODE- 250/637: Performed by: EMERGENCY MEDICINE

## 2019-01-01 PROCEDURE — 96361 HYDRATE IV INFUSION ADD-ON: CPT

## 2019-01-01 PROCEDURE — 93005 ELECTROCARDIOGRAM TRACING: CPT

## 2019-01-01 PROCEDURE — 83735 ASSAY OF MAGNESIUM: CPT

## 2019-01-01 PROCEDURE — 99283 EMERGENCY DEPT VISIT LOW MDM: CPT

## 2019-01-01 PROCEDURE — 82962 GLUCOSE BLOOD TEST: CPT

## 2019-01-01 PROCEDURE — 93306 TTE W/DOPPLER COMPLETE: CPT

## 2019-01-01 PROCEDURE — 97161 PT EVAL LOW COMPLEX 20 MIN: CPT | Performed by: PHYSICAL THERAPIST

## 2019-01-01 PROCEDURE — 74011250636 HC RX REV CODE- 250/636: Performed by: INTERNAL MEDICINE

## 2019-01-01 PROCEDURE — 99284 EMERGENCY DEPT VISIT MOD MDM: CPT

## 2019-01-01 PROCEDURE — 74011250636 HC RX REV CODE- 250/636: Performed by: EMERGENCY MEDICINE

## 2019-01-01 PROCEDURE — 76770 US EXAM ABDO BACK WALL COMP: CPT

## 2019-01-01 PROCEDURE — 84484 ASSAY OF TROPONIN QUANT: CPT

## 2019-01-01 PROCEDURE — 97165 OT EVAL LOW COMPLEX 30 MIN: CPT | Performed by: OCCUPATIONAL THERAPIST

## 2019-01-01 PROCEDURE — 96376 TX/PRO/DX INJ SAME DRUG ADON: CPT

## 2019-01-01 PROCEDURE — 65660000000 HC RM CCU STEPDOWN

## 2019-01-01 PROCEDURE — 71045 X-RAY EXAM CHEST 1 VIEW: CPT

## 2019-01-01 PROCEDURE — 82043 UR ALBUMIN QUANTITATIVE: CPT

## 2019-01-01 PROCEDURE — 82550 ASSAY OF CK (CPK): CPT

## 2019-01-01 PROCEDURE — A9500 TC99M SESTAMIBI: HCPCS

## 2019-01-01 PROCEDURE — 84300 ASSAY OF URINE SODIUM: CPT

## 2019-01-01 PROCEDURE — 96374 THER/PROPH/DIAG INJ IV PUSH: CPT

## 2019-01-01 PROCEDURE — 80047 BASIC METABLC PNL IONIZED CA: CPT

## 2019-01-01 PROCEDURE — 94760 N-INVAS EAR/PLS OXIMETRY 1: CPT

## 2019-01-01 PROCEDURE — 85027 COMPLETE CBC AUTOMATED: CPT

## 2019-01-01 PROCEDURE — 75810000284 HC CNTRL NASAL HEMORHRAGE SIMPLE

## 2019-01-01 PROCEDURE — 87086 URINE CULTURE/COLONY COUNT: CPT

## 2019-01-01 PROCEDURE — 93017 CV STRESS TEST TRACING ONLY: CPT

## 2019-01-01 PROCEDURE — 84133 ASSAY OF URINE POTASSIUM: CPT

## 2019-01-01 PROCEDURE — 74022 RADEX COMPL AQT ABD SERIES: CPT

## 2019-01-01 PROCEDURE — 65390000012 HC CONDITION CODE 44 OBSERVATION

## 2019-01-01 PROCEDURE — 83036 HEMOGLOBIN GLYCOSYLATED A1C: CPT

## 2019-01-01 PROCEDURE — 80048 BASIC METABOLIC PNL TOTAL CA: CPT

## 2019-01-01 PROCEDURE — 92950 HEART/LUNG RESUSCITATION CPR: CPT

## 2019-01-01 PROCEDURE — 83690 ASSAY OF LIPASE: CPT

## 2019-01-01 PROCEDURE — 97116 GAIT TRAINING THERAPY: CPT | Performed by: PHYSICAL THERAPIST

## 2019-01-01 PROCEDURE — 74011250637 HC RX REV CODE- 250/637: Performed by: FAMILY MEDICINE

## 2019-01-01 RX ORDER — POTASSIUM CHLORIDE 750 MG/1
30 TABLET, FILM COATED, EXTENDED RELEASE ORAL
Status: COMPLETED | OUTPATIENT
Start: 2019-01-01 | End: 2019-01-01

## 2019-01-01 RX ORDER — EPINEPHRINE 0.1 MG/ML
INJECTION INTRACARDIAC; INTRAVENOUS
Status: DISCONTINUED
Start: 2019-01-01 | End: 2019-12-14 | Stop reason: HOSPADM

## 2019-01-01 RX ORDER — NITROGLYCERIN 0.4 MG/1
TABLET SUBLINGUAL
Qty: 50 TAB | Refills: 0 | Status: SHIPPED | OUTPATIENT
Start: 2019-01-01

## 2019-01-01 RX ORDER — SODIUM CHLORIDE 0.9 % (FLUSH) 0.9 %
5-40 SYRINGE (ML) INJECTION AS NEEDED
Status: DISCONTINUED | OUTPATIENT
Start: 2019-01-01 | End: 2019-01-01 | Stop reason: HOSPADM

## 2019-01-01 RX ORDER — PANTOPRAZOLE SODIUM 40 MG/1
40 TABLET, DELAYED RELEASE ORAL DAILY
Status: DISCONTINUED | OUTPATIENT
Start: 2019-01-01 | End: 2019-01-01 | Stop reason: HOSPADM

## 2019-01-01 RX ORDER — ASPIRIN 81 MG/1
81 TABLET ORAL DAILY
Status: DISCONTINUED | OUTPATIENT
Start: 2019-01-01 | End: 2019-01-01 | Stop reason: HOSPADM

## 2019-01-01 RX ORDER — LANOLIN ALCOHOL/MO/W.PET/CERES
325 CREAM (GRAM) TOPICAL DAILY
Status: DISCONTINUED | OUTPATIENT
Start: 2019-01-01 | End: 2019-01-01 | Stop reason: HOSPADM

## 2019-01-01 RX ORDER — CIPROFLOXACIN 250 MG/1
125 TABLET, FILM COATED ORAL DAILY
COMMUNITY

## 2019-01-01 RX ORDER — NITROGLYCERIN 0.4 MG/1
0.4 TABLET SUBLINGUAL AS NEEDED
Status: DISCONTINUED | OUTPATIENT
Start: 2019-01-01 | End: 2019-01-01 | Stop reason: SDUPTHER

## 2019-01-01 RX ORDER — CARVEDILOL 6.25 MG/1
6.25 TABLET ORAL 2 TIMES DAILY WITH MEALS
Qty: 60 TAB | Refills: 0 | Status: SHIPPED | OUTPATIENT
Start: 2019-01-01

## 2019-01-01 RX ORDER — SODIUM CHLORIDE 0.9 % (FLUSH) 0.9 %
5-40 SYRINGE (ML) INJECTION EVERY 8 HOURS
Status: DISCONTINUED | OUTPATIENT
Start: 2019-01-01 | End: 2019-01-01 | Stop reason: HOSPADM

## 2019-01-01 RX ORDER — POTASSIUM CHLORIDE 1.5 G/1.77G
20 POWDER, FOR SOLUTION ORAL 2 TIMES DAILY WITH MEALS
Status: DISCONTINUED | OUTPATIENT
Start: 2019-01-01 | End: 2019-01-01 | Stop reason: HOSPADM

## 2019-01-01 RX ORDER — ISOSORBIDE MONONITRATE 30 MG/1
30 TABLET, EXTENDED RELEASE ORAL DAILY
Qty: 30 TAB | Refills: 0 | Status: SHIPPED | OUTPATIENT
Start: 2019-01-01

## 2019-01-01 RX ORDER — METOLAZONE 2.5 MG/1
2.5 TABLET ORAL
Qty: 30 TAB | Refills: 0 | Status: SHIPPED | OUTPATIENT
Start: 2019-01-01 | End: 2019-01-01 | Stop reason: SDUPTHER

## 2019-01-01 RX ORDER — CETIRIZINE HCL 10 MG
10 TABLET ORAL DAILY
Status: DISCONTINUED | OUTPATIENT
Start: 2019-01-01 | End: 2019-01-01 | Stop reason: HOSPADM

## 2019-01-01 RX ORDER — NITROGLYCERIN 0.4 MG/1
TABLET SUBLINGUAL
Qty: 50 TAB | Refills: 0 | Status: SHIPPED | OUTPATIENT
Start: 2019-01-01 | End: 2019-01-01 | Stop reason: SDUPTHER

## 2019-01-01 RX ORDER — ONDANSETRON 2 MG/ML
4 INJECTION INTRAMUSCULAR; INTRAVENOUS
Status: DISCONTINUED | OUTPATIENT
Start: 2019-01-01 | End: 2019-01-01 | Stop reason: HOSPADM

## 2019-01-01 RX ORDER — FUROSEMIDE 40 MG/1
40 TABLET ORAL 2 TIMES DAILY
Qty: 30 TAB | Refills: 0 | Status: ON HOLD | OUTPATIENT
Start: 2019-01-01 | End: 2019-01-01 | Stop reason: SDUPTHER

## 2019-01-01 RX ORDER — CARVEDILOL 12.5 MG/1
25 TABLET ORAL 2 TIMES DAILY WITH MEALS
Status: DISCONTINUED | OUTPATIENT
Start: 2019-01-01 | End: 2019-01-01

## 2019-01-01 RX ORDER — SODIUM CHLORIDE 9 MG/ML
50 INJECTION, SOLUTION INTRAVENOUS CONTINUOUS
Status: DISCONTINUED | OUTPATIENT
Start: 2019-01-01 | End: 2019-01-01 | Stop reason: HOSPADM

## 2019-01-01 RX ORDER — ACETAMINOPHEN 325 MG/1
650 TABLET ORAL
Status: DISCONTINUED | OUTPATIENT
Start: 2019-01-01 | End: 2019-01-01 | Stop reason: HOSPADM

## 2019-01-01 RX ORDER — FUROSEMIDE 10 MG/ML
40 INJECTION INTRAMUSCULAR; INTRAVENOUS ONCE
Status: COMPLETED | OUTPATIENT
Start: 2019-01-01 | End: 2019-01-01

## 2019-01-01 RX ORDER — FUROSEMIDE 10 MG/ML
40 INJECTION INTRAMUSCULAR; INTRAVENOUS 2 TIMES DAILY
Status: DISCONTINUED | OUTPATIENT
Start: 2019-01-01 | End: 2019-01-01

## 2019-01-01 RX ORDER — TRAMADOL HYDROCHLORIDE 50 MG/1
50 TABLET ORAL
Status: DISCONTINUED | OUTPATIENT
Start: 2019-01-01 | End: 2019-01-01 | Stop reason: HOSPADM

## 2019-01-01 RX ORDER — ONDANSETRON 2 MG/ML
4 INJECTION INTRAMUSCULAR; INTRAVENOUS
Status: COMPLETED | OUTPATIENT
Start: 2019-01-01 | End: 2019-01-01

## 2019-01-01 RX ORDER — LABETALOL HYDROCHLORIDE 5 MG/ML
10 INJECTION, SOLUTION INTRAVENOUS
Status: DISCONTINUED | OUTPATIENT
Start: 2019-01-01 | End: 2019-01-01 | Stop reason: HOSPADM

## 2019-01-01 RX ORDER — NITROGLYCERIN 0.4 MG/1
0.4 TABLET SUBLINGUAL
Status: DISCONTINUED | OUTPATIENT
Start: 2019-01-01 | End: 2019-01-01 | Stop reason: HOSPADM

## 2019-01-01 RX ORDER — CARVEDILOL 3.12 MG/1
6.25 TABLET ORAL 2 TIMES DAILY WITH MEALS
Status: DISCONTINUED | OUTPATIENT
Start: 2019-01-01 | End: 2019-01-01 | Stop reason: HOSPADM

## 2019-01-01 RX ORDER — POTASSIUM CHLORIDE 750 MG/1
10 TABLET, FILM COATED, EXTENDED RELEASE ORAL 2 TIMES DAILY
Status: DISCONTINUED | OUTPATIENT
Start: 2019-01-01 | End: 2019-01-01 | Stop reason: HOSPADM

## 2019-01-01 RX ORDER — HEPARIN SODIUM 5000 [USP'U]/ML
5000 INJECTION, SOLUTION INTRAVENOUS; SUBCUTANEOUS EVERY 8 HOURS
Status: DISCONTINUED | OUTPATIENT
Start: 2019-01-01 | End: 2019-01-01 | Stop reason: HOSPADM

## 2019-01-01 RX ORDER — FUROSEMIDE 40 MG/1
TABLET ORAL
Qty: 30 TAB | Refills: 0 | Status: SHIPPED | OUTPATIENT
Start: 2019-01-01

## 2019-01-01 RX ORDER — OXYMETAZOLINE HCL 0.05 %
2 SPRAY, NON-AEROSOL (ML) NASAL ONCE
Status: DISCONTINUED | OUTPATIENT
Start: 2019-01-01 | End: 2019-01-01 | Stop reason: HOSPADM

## 2019-01-01 RX ORDER — ISOSORBIDE MONONITRATE 30 MG/1
30 TABLET, EXTENDED RELEASE ORAL DAILY
Status: DISCONTINUED | OUTPATIENT
Start: 2019-01-01 | End: 2019-01-01 | Stop reason: HOSPADM

## 2019-01-01 RX ORDER — FUROSEMIDE 40 MG/1
40 TABLET ORAL 2 TIMES DAILY
Status: DISCONTINUED | OUTPATIENT
Start: 2019-01-01 | End: 2019-01-01

## 2019-01-01 RX ORDER — IPRATROPIUM BROMIDE AND ALBUTEROL SULFATE 2.5; .5 MG/3ML; MG/3ML
3 SOLUTION RESPIRATORY (INHALATION)
Status: DISCONTINUED | OUTPATIENT
Start: 2019-01-01 | End: 2019-01-01 | Stop reason: HOSPADM

## 2019-01-01 RX ORDER — CARVEDILOL 12.5 MG/1
12.5 TABLET ORAL 2 TIMES DAILY WITH MEALS
Status: DISCONTINUED | OUTPATIENT
Start: 2019-01-01 | End: 2019-01-01

## 2019-01-01 RX ORDER — OXYMETAZOLINE HCL 0.05 %
2 SPRAY, NON-AEROSOL (ML) NASAL 2 TIMES DAILY
Qty: 1 EACH | Refills: 0 | Status: SHIPPED | OUTPATIENT
Start: 2019-01-01 | End: 2019-01-01

## 2019-01-01 RX ORDER — ALBUTEROL SULFATE 90 UG/1
1-2 AEROSOL, METERED RESPIRATORY (INHALATION)
Status: DISCONTINUED | OUTPATIENT
Start: 2019-01-01 | End: 2019-01-01 | Stop reason: HOSPADM

## 2019-01-01 RX ORDER — HYDROCODONE BITARTRATE AND ACETAMINOPHEN 5; 325 MG/1; MG/1
1 TABLET ORAL ONCE
Status: COMPLETED | OUTPATIENT
Start: 2019-01-01 | End: 2019-01-01

## 2019-01-01 RX ORDER — ATORVASTATIN CALCIUM 40 MG/1
40 TABLET, FILM COATED ORAL DAILY
Status: DISCONTINUED | OUTPATIENT
Start: 2019-01-01 | End: 2019-01-01 | Stop reason: HOSPADM

## 2019-01-01 RX ORDER — ASPIRIN 325 MG
325 TABLET ORAL
Status: ACTIVE | OUTPATIENT
Start: 2019-01-01 | End: 2019-01-01

## 2019-01-01 RX ORDER — LISINOPRIL 5 MG/1
5 TABLET ORAL DAILY
COMMUNITY

## 2019-01-01 RX ORDER — ASPIRIN 81 MG/1
81 TABLET ORAL DAILY
COMMUNITY

## 2019-01-01 RX ORDER — LISINOPRIL 5 MG/1
5 TABLET ORAL DAILY
Status: DISCONTINUED | OUTPATIENT
Start: 2019-01-01 | End: 2019-01-01 | Stop reason: HOSPADM

## 2019-01-01 RX ORDER — FUROSEMIDE 10 MG/ML
20 INJECTION INTRAMUSCULAR; INTRAVENOUS ONCE
Status: COMPLETED | OUTPATIENT
Start: 2019-01-01 | End: 2019-01-01

## 2019-01-01 RX ORDER — OXYMETAZOLINE HCL 0.05 %
2 SPRAY, NON-AEROSOL (ML) NASAL
Status: COMPLETED | OUTPATIENT
Start: 2019-01-01 | End: 2019-01-01

## 2019-01-01 RX ORDER — FUROSEMIDE 40 MG/1
40 TABLET ORAL
Status: DISCONTINUED | OUTPATIENT
Start: 2019-01-01 | End: 2019-01-01 | Stop reason: HOSPADM

## 2019-01-01 RX ORDER — SODIUM CHLORIDE 0.9 % (FLUSH) 0.9 %
10 SYRINGE (ML) INJECTION AS NEEDED
Status: DISCONTINUED | OUTPATIENT
Start: 2019-01-01 | End: 2019-01-01 | Stop reason: HOSPADM

## 2019-01-01 RX ADMIN — FERROUS SULFATE TAB 325 MG (65 MG ELEMENTAL FE) 325 MG: 325 (65 FE) TAB at 08:51

## 2019-01-01 RX ADMIN — FUROSEMIDE 20 MG: 10 INJECTION, SOLUTION INTRAMUSCULAR; INTRAVENOUS at 10:23

## 2019-01-01 RX ADMIN — LISINOPRIL 5 MG: 5 TABLET ORAL at 08:43

## 2019-01-01 RX ADMIN — OXYMETAZOLINE HCL 2 SPRAY: 0.05 SPRAY NASAL at 02:24

## 2019-01-01 RX ADMIN — POTASSIUM CHLORIDE 10 MEQ: 750 TABLET, FILM COATED, EXTENDED RELEASE ORAL at 08:51

## 2019-01-01 RX ADMIN — FUROSEMIDE 40 MG: 10 INJECTION, SOLUTION INTRAMUSCULAR; INTRAVENOUS at 08:43

## 2019-01-01 RX ADMIN — SODIUM CHLORIDE 50 ML/HR: 900 INJECTION, SOLUTION INTRAVENOUS at 15:36

## 2019-01-01 RX ADMIN — ATORVASTATIN CALCIUM 40 MG: 40 TABLET, FILM COATED ORAL at 08:51

## 2019-01-01 RX ADMIN — POTASSIUM CHLORIDE 10 MEQ: 750 TABLET, FILM COATED, EXTENDED RELEASE ORAL at 08:34

## 2019-01-01 RX ADMIN — PANTOPRAZOLE SODIUM 40 MG: 40 TABLET, DELAYED RELEASE ORAL at 08:33

## 2019-01-01 RX ADMIN — ACETAMINOPHEN 650 MG: 325 TABLET ORAL at 13:52

## 2019-01-01 RX ADMIN — ISOSORBIDE MONONITRATE 30 MG: 30 TABLET, EXTENDED RELEASE ORAL at 08:43

## 2019-01-01 RX ADMIN — NITROGLYCERIN 0.4 MG: 0.4 TABLET, ORALLY DISINTEGRATING SUBLINGUAL at 18:35

## 2019-01-01 RX ADMIN — CARVEDILOL 25 MG: 12.5 TABLET, FILM COATED ORAL at 20:14

## 2019-01-01 RX ADMIN — ATORVASTATIN CALCIUM 40 MG: 40 TABLET, FILM COATED ORAL at 08:22

## 2019-01-01 RX ADMIN — HEPARIN SODIUM 5000 UNITS: 5000 INJECTION INTRAVENOUS; SUBCUTANEOUS at 17:35

## 2019-01-01 RX ADMIN — FUROSEMIDE 40 MG: 40 TABLET ORAL at 21:44

## 2019-01-01 RX ADMIN — FLUTICASONE FUROATE 1 PUFF: 100 POWDER RESPIRATORY (INHALATION) at 09:09

## 2019-01-01 RX ADMIN — POTASSIUM CHLORIDE 10 MEQ: 750 TABLET, FILM COATED, EXTENDED RELEASE ORAL at 17:18

## 2019-01-01 RX ADMIN — LISINOPRIL 5 MG: 5 TABLET ORAL at 08:51

## 2019-01-01 RX ADMIN — ASPIRIN 81 MG: 81 TABLET, COATED ORAL at 08:43

## 2019-01-01 RX ADMIN — PANTOPRAZOLE SODIUM 40 MG: 40 TABLET, DELAYED RELEASE ORAL at 08:43

## 2019-01-01 RX ADMIN — ISOSORBIDE MONONITRATE 30 MG: 30 TABLET, EXTENDED RELEASE ORAL at 11:22

## 2019-01-01 RX ADMIN — PANTOPRAZOLE SODIUM 40 MG: 40 TABLET, DELAYED RELEASE ORAL at 08:22

## 2019-01-01 RX ADMIN — POTASSIUM CHLORIDE 10 MEQ: 750 TABLET, FILM COATED, EXTENDED RELEASE ORAL at 17:16

## 2019-01-01 RX ADMIN — HEPARIN SODIUM 5000 UNITS: 5000 INJECTION INTRAVENOUS; SUBCUTANEOUS at 02:12

## 2019-01-01 RX ADMIN — FUROSEMIDE 40 MG: 10 INJECTION, SOLUTION INTRAMUSCULAR; INTRAVENOUS at 13:57

## 2019-01-01 RX ADMIN — CARVEDILOL 12.5 MG: 12.5 TABLET, FILM COATED ORAL at 08:22

## 2019-01-01 RX ADMIN — ATORVASTATIN CALCIUM 40 MG: 40 TABLET, FILM COATED ORAL at 08:43

## 2019-01-01 RX ADMIN — CARVEDILOL 6.25 MG: 3.12 TABLET, FILM COATED ORAL at 08:33

## 2019-01-01 RX ADMIN — CETIRIZINE HYDROCHLORIDE 10 MG: 10 TABLET, FILM COATED ORAL at 08:33

## 2019-01-01 RX ADMIN — ATORVASTATIN CALCIUM 40 MG: 40 TABLET, FILM COATED ORAL at 08:33

## 2019-01-01 RX ADMIN — Medication 10 ML: at 13:03

## 2019-01-01 RX ADMIN — FUROSEMIDE 40 MG: 10 INJECTION, SOLUTION INTRAMUSCULAR; INTRAVENOUS at 17:18

## 2019-01-01 RX ADMIN — ASPIRIN 81 MG: 81 TABLET, COATED ORAL at 08:51

## 2019-01-01 RX ADMIN — POTASSIUM CHLORIDE 20 MEQ: 1.5 POWDER, FOR SOLUTION ORAL at 08:22

## 2019-01-01 RX ADMIN — CETIRIZINE HYDROCHLORIDE 10 MG: 10 TABLET, FILM COATED ORAL at 08:51

## 2019-01-01 RX ADMIN — FERROUS SULFATE TAB 325 MG (65 MG ELEMENTAL FE) 325 MG: 325 (65 FE) TAB at 08:43

## 2019-01-01 RX ADMIN — FUROSEMIDE 40 MG: 10 INJECTION, SOLUTION INTRAMUSCULAR; INTRAVENOUS at 17:16

## 2019-01-01 RX ADMIN — APIXABAN 5 MG: 5 TABLET, FILM COATED ORAL at 17:18

## 2019-01-01 RX ADMIN — FLUTICASONE FUROATE 1 PUFF: 100 POWDER RESPIRATORY (INHALATION) at 08:30

## 2019-01-01 RX ADMIN — APIXABAN 5 MG: 5 TABLET, FILM COATED ORAL at 08:43

## 2019-01-01 RX ADMIN — POTASSIUM CHLORIDE 10 MEQ: 750 TABLET, FILM COATED, EXTENDED RELEASE ORAL at 21:44

## 2019-01-01 RX ADMIN — PANTOPRAZOLE SODIUM 40 MG: 40 TABLET, DELAYED RELEASE ORAL at 08:51

## 2019-01-01 RX ADMIN — HEPARIN SODIUM 5000 UNITS: 5000 INJECTION INTRAVENOUS; SUBCUTANEOUS at 10:34

## 2019-01-01 RX ADMIN — ATORVASTATIN CALCIUM 40 MG: 40 TABLET, FILM COATED ORAL at 09:06

## 2019-01-01 RX ADMIN — SODIUM CHLORIDE 50 ML/HR: 900 INJECTION, SOLUTION INTRAVENOUS at 18:53

## 2019-01-01 RX ADMIN — APIXABAN 5 MG: 5 TABLET, FILM COATED ORAL at 10:18

## 2019-01-01 RX ADMIN — Medication 10 ML: at 22:00

## 2019-01-01 RX ADMIN — CETIRIZINE HYDROCHLORIDE 10 MG: 10 TABLET, FILM COATED ORAL at 08:43

## 2019-01-01 RX ADMIN — LISINOPRIL 5 MG: 5 TABLET ORAL at 08:33

## 2019-01-01 RX ADMIN — HYDROCODONE BITARTRATE AND ACETAMINOPHEN 1 TABLET: 5; 325 TABLET ORAL at 03:21

## 2019-01-01 RX ADMIN — ASPIRIN 81 MG: 81 TABLET, COATED ORAL at 08:33

## 2019-01-01 RX ADMIN — CARVEDILOL 25 MG: 12.5 TABLET, FILM COATED ORAL at 09:06

## 2019-01-01 RX ADMIN — APIXABAN 5 MG: 5 TABLET, FILM COATED ORAL at 21:44

## 2019-01-01 RX ADMIN — POTASSIUM CHLORIDE 30 MEQ: 750 TABLET, FILM COATED, EXTENDED RELEASE ORAL at 08:33

## 2019-01-01 RX ADMIN — REGADENOSON 0.4 MG: 0.08 INJECTION, SOLUTION INTRAVENOUS at 13:03

## 2019-01-01 RX ADMIN — CARVEDILOL 6.25 MG: 3.12 TABLET, FILM COATED ORAL at 08:43

## 2019-01-01 RX ADMIN — APIXABAN 5 MG: 5 TABLET, FILM COATED ORAL at 17:16

## 2019-01-01 RX ADMIN — HEPARIN SODIUM 5000 UNITS: 5000 INJECTION INTRAVENOUS; SUBCUTANEOUS at 11:07

## 2019-01-01 RX ADMIN — Medication 10 ML: at 14:28

## 2019-01-01 RX ADMIN — HEPARIN SODIUM 5000 UNITS: 5000 INJECTION INTRAVENOUS; SUBCUTANEOUS at 02:41

## 2019-01-01 RX ADMIN — POTASSIUM CHLORIDE 10 MEQ: 750 TABLET, FILM COATED, EXTENDED RELEASE ORAL at 08:43

## 2019-01-01 RX ADMIN — PANTOPRAZOLE SODIUM 40 MG: 40 TABLET, DELAYED RELEASE ORAL at 09:06

## 2019-01-01 RX ADMIN — ONDANSETRON 4 MG: 2 INJECTION INTRAMUSCULAR; INTRAVENOUS at 16:58

## 2019-01-01 RX ADMIN — ISOSORBIDE MONONITRATE 30 MG: 30 TABLET, EXTENDED RELEASE ORAL at 08:33

## 2019-01-01 RX ADMIN — CARVEDILOL 6.25 MG: 3.12 TABLET, FILM COATED ORAL at 17:18

## 2019-01-01 RX ADMIN — CARVEDILOL 6.25 MG: 3.12 TABLET, FILM COATED ORAL at 17:16

## 2019-01-01 RX ADMIN — FUROSEMIDE 40 MG: 10 INJECTION, SOLUTION INTRAMUSCULAR; INTRAVENOUS at 08:33

## 2019-01-01 RX ADMIN — CARVEDILOL 6.25 MG: 3.12 TABLET, FILM COATED ORAL at 08:51

## 2019-01-01 RX ADMIN — FUROSEMIDE 40 MG: 40 TABLET ORAL at 08:51

## 2019-01-01 RX ADMIN — CARVEDILOL 12.5 MG: 12.5 TABLET, FILM COATED ORAL at 17:35

## 2019-01-01 RX ADMIN — APIXABAN 5 MG: 5 TABLET, FILM COATED ORAL at 08:33

## 2019-01-01 RX ADMIN — FERROUS SULFATE TAB 325 MG (65 MG ELEMENTAL FE) 325 MG: 325 (65 FE) TAB at 08:33

## 2019-01-01 RX ADMIN — Medication 10 ML: at 05:57

## 2019-01-16 PROBLEM — N17.9 AKI (ACUTE KIDNEY INJURY) (HCC): Status: ACTIVE | Noted: 2019-01-01

## 2019-01-16 NOTE — ED PROVIDER NOTES
EMERGENCY DEPARTMENT HISTORY AND PHYSICAL EXAM 
 
 
Date: 1/16/2019 Patient Name: Dina Galan History of Presenting Illness Chief Complaint Patient presents with  Generalized Body Aches PT/EMS report generalized body discomfort. Pt states she has felt like this for months, HX of heart disease and home oxygen. History Provided By: Patient and Patient's Daughter HPI: iDna Galan, 80 y.o. female with PMHx significant for SSS, CAD, GERD, PUD, lupus, asthma, HTN, s/p AICD, presents via EMS to the ED with cc of chills, n/v x2, and generalized malaise onset today. Per EMS, pt is on 3L O2 at home, pt's VSS en route. Daughter reports that pt uses cane and walker at baseline. Daughter states that pt takes ASA 81 mg daily. Daughter states that pt is on Cipro for UTI. Daughter reports diarrhea incontinence en route. Pt denies CP, SOB, abd pain, HA, dysuria, cough, hematuria, BLE swelling, or lightheadedness. There are no other complaints, changes, or physical findings at this time. PCP: Khoa Beasley MD 
 
Social Hx:  
Social History Tobacco Use Smoking Status Never Smoker Smokeless Tobacco Never Used  
,  
Social History Substance and Sexual Activity Alcohol Use No  
 Alcohol/week: 0.0 oz  
, Social History Substance and Sexual Activity Drug Use No  
 
 
Past History Past Surgical History: 
Past Surgical History:  
Procedure Laterality Date  CARDIAC SURG PROCEDURE UNLIST    
 pacemaker/defibrilator.  HX GYN    
 BTL  
 HX PACEMAKER    
 TX COLONOSCOPY W/BIOPSY SINGLE/MULTIPLE  3/26/2012  TX COLSC FLX W/RMVL OF TUMOR POLYP LESION SNARE TQ  3/26/2012 Family History: 
Family History Problem Relation Age of Onset  Arrhythmia Mother  Hypertension Mother  Hypertension Father Allergies: Allergies Allergen Reactions  Contrast Agent [Iodine] Anaphylaxis Made aware of allergy 1/4/13  Sulfa (Sulfonamide Antibiotics) Hives  Codeine Anxiety  Pcn [Penicillins] Anaphylaxis Reaction was to penicillin injections into buttocks. She can take oral amoxicillin Review of Systems Review of Systems Constitutional: Positive for chills. +Malaise HENT: Negative. Eyes: Negative. Respiratory: Negative. Negative for cough and shortness of breath. Cardiovascular: Negative for chest pain and leg swelling. Gastrointestinal: Positive for diarrhea, nausea and vomiting (x2). Negative for abdominal pain. Endocrine: Negative. Negative for heat intolerance. Genitourinary: Negative. Negative for dysuria and hematuria. Musculoskeletal: Negative. Skin: Negative. Allergic/Immunologic: Negative. Negative for immunocompromised state. Neurological: Negative for light-headedness. Hematological: Does not bruise/bleed easily. Psychiatric/Behavioral: Negative. All other systems reviewed and are negative. Physical Exam  
Physical Exam  
Constitutional: She is oriented to person, place, and time. She appears well-developed and well-nourished. No distress. HENT:  
Head: Normocephalic and atraumatic. Eyes: EOM are normal. Pupils are equal, round, and reactive to light. Neck: Normal range of motion. Neck supple. Cardiovascular: Normal rate, regular rhythm and normal heart sounds. Pulmonary/Chest: Effort normal and breath sounds normal. No respiratory distress. Abdominal: Soft. Bowel sounds are normal. She exhibits no mass. There is no tenderness. Musculoskeletal: Normal range of motion. She exhibits no edema. Neurological: She is alert and oriented to person, place, and time. Coordination normal.  
Sensory and motor intact Skin: Skin is warm and dry. Psychiatric: She has a normal mood and affect. Her behavior is normal.  
Nursing note and vitals reviewed. Diagnostic Study Results Labs - Recent Results (from the past 12 hour(s)) EKG, 12 LEAD, INITIAL Collection Time: 01/16/19  2:40 PM  
Result Value Ref Range Ventricular Rate 65 BPM  
 Atrial Rate 65 BPM  
 P-R Interval 198 ms QRS Duration 138 ms Q-T Interval 476 ms QTC Calculation (Bezet) 495 ms Calculated P Axis 44 degrees Calculated R Axis -25 degrees Calculated T Axis 15 degrees Diagnosis Normal sinus rhythm Left ventricular hypertrophy with QRS widening Cannot rule out Septal infarct , age undetermined When compared with ECG of 07-DEC-2018 12:35, 
premature ventricular complexes are no longer present Minimal criteria for Septal infarct are now present CBC WITH AUTOMATED DIFF Collection Time: 01/16/19  3:00 PM  
Result Value Ref Range WBC 7.8 3.6 - 11.0 K/uL  
 RBC 3.38 (L) 3.80 - 5.20 M/uL HGB 8.9 (L) 11.5 - 16.0 g/dL HCT 29.7 (L) 35.0 - 47.0 % MCV 87.9 80.0 - 99.0 FL  
 MCH 26.3 26.0 - 34.0 PG  
 MCHC 30.0 30.0 - 36.5 g/dL  
 RDW 13.3 11.5 - 14.5 % PLATELET 717 308 - 754 K/uL MPV 9.0 8.9 - 12.9 FL  
 NRBC 0.0 0  WBC ABSOLUTE NRBC 0.00 0.00 - 0.01 K/uL NEUTROPHILS 69 32 - 75 % LYMPHOCYTES 14 12 - 49 % MONOCYTES 13 5 - 13 % EOSINOPHILS 4 0 - 7 % BASOPHILS 1 0 - 1 % IMMATURE GRANULOCYTES 1 (H) 0.0 - 0.5 % ABS. NEUTROPHILS 5.3 1.8 - 8.0 K/UL  
 ABS. LYMPHOCYTES 1.1 0.8 - 3.5 K/UL  
 ABS. MONOCYTES 1.0 0.0 - 1.0 K/UL  
 ABS. EOSINOPHILS 0.3 0.0 - 0.4 K/UL  
 ABS. BASOPHILS 0.1 0.0 - 0.1 K/UL  
 ABS. IMM. GRANS. 0.1 (H) 0.00 - 0.04 K/UL  
 DF AUTOMATED METABOLIC PANEL, COMPREHENSIVE Collection Time: 01/16/19  3:00 PM  
Result Value Ref Range Sodium 136 136 - 145 mmol/L Potassium 3.7 3.5 - 5.1 mmol/L Chloride 93 (L) 97 - 108 mmol/L  
 CO2 36 (H) 21 - 32 mmol/L Anion gap 7 5 - 15 mmol/L Glucose 153 (H) 65 - 100 mg/dL BUN 79 (H) 6 - 20 MG/DL Creatinine 2.58 (H) 0.55 - 1.02 MG/DL  
 BUN/Creatinine ratio 31 (H) 12 - 20 GFR est AA 21 (L) >60 ml/min/1.73m2 GFR est non-AA 18 (L) >60 ml/min/1.73m2 Calcium 9.1 8.5 - 10.1 MG/DL Bilirubin, total 1.0 0.2 - 1.0 MG/DL  
 ALT (SGPT) 18 12 - 78 U/L  
 AST (SGOT) 20 15 - 37 U/L Alk. phosphatase 77 45 - 117 U/L Protein, total 8.5 (H) 6.4 - 8.2 g/dL Albumin 3.8 3.5 - 5.0 g/dL Globulin 4.7 (H) 2.0 - 4.0 g/dL A-G Ratio 0.8 (L) 1.1 - 2.2 CK Collection Time: 01/16/19  3:00 PM  
Result Value Ref Range CK 64 26 - 192 U/L  
NT-PRO BNP Collection Time: 01/16/19  3:00 PM  
Result Value Ref Range NT pro- 0 - 450 PG/ML  
TROPONIN I Collection Time: 01/16/19  3:00 PM  
Result Value Ref Range Troponin-I, Qt. <0.05 <0.05 ng/mL Radiologic Studies -  
XR ABD ACUTE W 1 V CHEST    (Results Pending) US RETROPERITONEUM COMP    (Results Pending) CT Results  (Last 48 hours) None CXR Results  (Last 48 hours) None Medical Decision Making I am the first provider for this patient. I reviewed the vital signs, available nursing notes, past medical history, past surgical history, family history and social history. Vital Signs-Reviewed the patient's vital signs. Patient Vitals for the past 12 hrs: 
 Temp Pulse Resp BP SpO2  
01/16/19 1433 97.2 °F (36.2 °C) 69 20 129/73 98 % Pulse Oximetry Analysis - 98% on 3L NC Records Reviewed: Nursing Notes, Old Medical Records, Previous Radiology Studies and Previous Laboratory Studies EKG interpretation: (Preliminary) 14:40 Rhythm: normal sinus rhythm; and regular . Rate (approx.): 65; Axis: normal; DE interval: normal; QRS interval: prolonged; ST/T wave: normal; Other findings: no significant changes from 12/7/2018. Written by Ottoniel Velázquez ED Scribe, as dictated by Mandy Briseno MD. Provider Notes (Medical Decision Making): DDx: GE, gastritis, electrolyte abnormality, UTI, dehydration, CAD. ED Course:  
Initial assessment performed.  The patients presenting problems have been discussed, and they are in agreement with the care plan formulated and outlined with them. I have encouraged them to ask questions as they arise throughout their visit. Progress Notes: 
CONSULT NOTE:  
4:01 PM 
Mandy Briseno MD spoke with Dr. Jose Ku, Specialty: Hospitalist 
Discussed pt's hx, disposition, and available diagnostic and imaging results. Reviewed care plans. Wait for x-rays to come back in case of obstruction. Written by Ottoniel Velázquez ED Scribe, as dictated by Mandy Briseno MD. 
  
CONSULT NOTE:  
5:39 PM 
Mandy Briseno MD spoke with Dr. Jose Ku, Specialty: Hospitalist 
Discussed pt's hx, disposition, and available diagnostic and imaging results. Reviewed care plans and x-ray. Consultant will evaluate pt for admission. Written by KIM Pegueroibe, as dictated by Mandy Briseno MD. Critical Care Time: CRITICAL CARE NOTE : 
 
4:02 PM 
 
IMPENDING DETERIORATION -Respiratory, Cardiovascular, Metabolic and Renal 
 
ASSOCIATED RISK FACTORS - Hypoxia, Dysrhythmia, Metabolic changes and Dehydration MANAGEMENT- Bedside Assessment and Supervision of Care INTERPRETATION -  Xrays, ECG and Blood Pressure INTERVENTIONS - hemodynamic mngmt and Metobolic interventions CASE REVIEW - Hospitalist, Nursing and Family TREATMENT RESPONSE -Unchanged PERFORMED BY - Self NOTES   : 
 
I have spent 45 minutes of critical care time involved in lab review, consultations with specialist, family decision- making, bedside attention and documentation. During this entire length of time I was immediately available to the patient . Mandy Briseno MD 
 
Disposition: PLAN FOR ADMISSION: 
5:39 PM 
The patient is being admitted to the hospital. The results of their tests and reasons for their admission have been discussed with the patient and/or available family.  The patient/family has conveyed agreement and understanding for the need to be admitted and for their admission diagnosis. Consultation has been made with the inpatient physician specialist for hospitalization. Written by Luz Marina Winkler, KIM Scribe, as dictated by Luciana Walsh MD 
 
Diagnosis Clinical Impression: 1. Acute renal failure, unspecified acute renal failure type ([address] 2. Essential hypertension Attestations: This note is prepared by Luz Marina Winkler, acting as scribe for Luciana Walsh MD. 
 
Luciana Walsh MD: The scribe's documentation has been prepared under my direction and personally reviewed by me in its entirety. I confirm that the note above accurately reflects all work, treatment, procedures, and medical decision making performed by me.

## 2019-01-16 NOTE — H&P
Hospitalist Admission NoteNAME: Colleen Solomon :  1934 MRN:  [id number] Date/Time:  2019 6:11 PM 
 
Patient PCP: Carlyn Medina MD 
________________________________________________________________________ Given the patient's current clinical presentation, I have a high level of concern for decompensation if discharged from the emergency department. Complex decision making was performed, which includes reviewing the patient's available past medical records, laboratory results, and x-ray films. My assessment of this patient's clinical condition and my plan of care is as follows. Assessment / Plan: 
Acute kidney injury, most likely prerenal POA We will get urine electrolytes, UA Gentle hydration as patient has history of CHF We will consult nephrology Renal ultrasound is negative for hydronephrosis Hold diuretics, hold ACE inhibitor.  To be restarted upon discharge. Intractable nausea and vomiting POA Diarrhea, viral Gastroenteritis? No fever on and no leukocytosis, most likely viral 
We will hold off on antibiotics, continue IV fluid Sent for stool culture and stain, C. Difficile sent by ED  ( doesn't meet criteria as stools soft not watery) Symptomatic treatment with Zofran Daily BMP Systolic CHF, not in decompensation Last EF was 40-45% in 2018, We will hold Lasix and metolazone We will continue with Coreg Chronic respiratory failure with hypoxia, on home oxygen Secondary to asthma/COPD not in acute exacerbation Continue with fluticasone Ellipta and as needed albuterol Body mass index is 34.95 kg/m². therefore classifying patient as obese, NOS (BMI of >30 kg/m2) 
-counseled exercise/diet. Patient receptive. I have personally reviewed the radiographs, laboratory data in Epic and decisions and statements above are based partially on this personal interpretation. Code Status: Full Code DVT Prophylaxis: Hep SQ 
 GI Prophylaxis: not indicated Subjective: CHIEF COMPLAINT: Nausea and vomiting HISTORY OF PRESENT ILLNESS:    
Alfred Ibrahim is a 80 y.o.  female with known history as listed below presents to ED with complaint noted above. Available records were reviewed at the time of H&P. This is a 80-year-old female with past medical history of SSS status post pacemaker, CAD, GERD PUD, lupus, asthma , hypertension who was sent to the ED for multiple episodes of nonbloody nausea and vomiting associated with loose stools since this morning.  Most of the HPI was obtained from 1 of the children, who reported that her mom has decreased p.o. intake for the past 1 or 2 weeks, was recently admitted for CHF exacerbation, discharged on metolazone 3 times a week in addition to her home dose Lasix. In the ED, vital signs were stable labs showed AK I with creatinine of 2.58 . 
  Abdominal x-ray was negative for any acute obstruction and  renal ultrasound showed no hydronephrosis. We were asked to see for work up and evaluation of the above problems. Past Medical History:  
Diagnosis Date  Asthma  Autoimmune disease ([address] lupus  CAD (coronary artery disease)   
 cardiac cath  CAD (coronary artery disease) sick sinus syndrome  Essential hypertension  GERD (gastroesophageal reflux disease)  Glaucoma  Heart failure ([address] Lupus  Hypertension  Other ill-defined conditions(799.89)  Pacemaker  PUD (peptic ulcer disease)  S/P implantation of automatic cardioverter/defibrillator (AICD) 1/14/2015 [facility] upgrade to AICD implant  SSS (sick sinus syndrome) ([address] Past Surgical History:  
Procedure Laterality Date  CARDIAC SURG PROCEDURE UNLIST    
 pacemaker/defibrilator.  HX GYN    
 BTL  
 HX PACEMAKER    
 DE COLONOSCOPY W/BIOPSY SINGLE/MULTIPLE  3/26/2012  CT COLSC FLX W/RMVL OF TUMOR POLYP LESION SNARE TQ  3/26/2012 Social History Tobacco Use  Smoking status: Never Smoker  Smokeless tobacco: Never Used Substance Use Topics  Alcohol use: No  
  Alcohol/week: 0.0 oz Family History Problem Relation Age of Onset  Arrhythmia Mother  Hypertension Mother  Hypertension Father Allergies Allergen Reactions  Contrast Agent [Iodine] Anaphylaxis Made aware of allergy 1/4/13  Sulfa (Sulfonamide Antibiotics) Hives  Codeine Anxiety  Pcn [Penicillins] Anaphylaxis Reaction was to penicillin injections into buttocks. She can take oral amoxicillin Prior to Admission medications Medication Sig Start Date End Date Taking? Authorizing Provider  
nitroglycerin (NITROSTAT) 0.4 mg SL tablet USE 1 TABLET UNDER THE TONGUE EVERY 5 MINUTES AS NEEDED FOR CHEST PAIN CALL 911 IF NOT RELIEVED BY 3 TABLETS 1/12/19   Gerardo Thomas MD  
ARNUITY ELLIPTA 100 mcg/actuation dsdv inhaler INHALE 1 PUFF PO Q 24 H 11/22/18   Provider, Historical  
metOLazone (ZAROXOLYN) 2.5 mg tablet Take 1 Tab by mouth every Monday and Friday. 12/21/18   Vernestine Fire., NP  
furosemide (LASIX) 80 mg tablet TAKE 1 TABLET BY MOUTH TWICE DAILY 10/25/18   Gerardo Thomas MD  
potassium chloride SR (KLOR-CON 10) 10 mEq tablet Take 10 mEq by mouth daily. Patient takes 10 mEq daily and 20 mEq QHS    Cash Pop MD  
albuterol (PROVENTIL HFA, VENTOLIN HFA, PROAIR HFA) 90 mcg/actuation inhaler Take 1-2 Puffs by inhalation every four (4) hours as needed for Wheezing or Shortness of Breath. Cash Pop MD  
Psyllium Husk-Sucrose (FIBER, PSYLLIUM HUSK/SUGAR,) 3.4 gram/11 gram powd Take 1 Cap by mouth daily. Cash Pop MD  
mv-mn/folic acid/calcium/vit K (ONE-A-DAY WOMEN'S 50 PLUS PO) Take 1 Tab by mouth daily.     Cash Pop MD  
carvedilol (COREG) 25 mg tablet TAKE 1 TABLET BY MOUTH TWICE DAILY WITH MEALS 8/25/17   Macrina TAYLOR NP  
atorvastatin (LIPITOR) 40 mg tablet TAKE 1 TABLET BY MOUTH DAILY 7/28/17   Macrina TAYLOR NP  
lisinopril (PRINIVIL, ZESTRIL) 10 mg tablet TAKE 1 TABLET BY MOUTH DAILY 4/11/15   Farzana Mckeon MD  
NEXIUM 40 mg capsule TAKE ONE CAPSULE BY MOUTH DAILY 2/28/15   Farzana Mckeon MD  
budesonide (PULMICORT) 180 mcg/actuation aepb inhaler Take 2 puffs by inhalation two (2) times a day. Provider, Historical  
ciprofloxacin (CIPRO) 250 mg tablet Take 125 mg by mouth daily. for UTI prophylaxis    Provider, Historical  
acetaminophen (TYLENOL ARTHRITIS PAIN) 650 mg CR tablet Take 650 mg by mouth every six (6) hours as needed for Pain. Provider, Historical  
latanoprost (XALATAN) 0.005 % ophthalmic solution Administer 1 Drop to both eyes nightly. Cash Pop MD  
cetirizine (ZYRTEC) 10 mg tablet Take 10 mg by mouth daily. Cash Pop MD  
 
REVIEW OF SYSTEMS:  See HPI for details General: negative for fever, chills, sweats, weakness, weight loss Eyes: negative for blurred vision, eye pain, loss of vision, diplopia Ear Nose and Throat: negative for rhinorrhea, pharyngitis, otalgia, tinnitus, speech or swallowing difficulties Respiratory:  negative for pleuritic pain, cough, sputum production, wheezing, SOB, VILLAR Cardiology:  negative for chest pain, palpitations, orthopnea, PND, edema, syncope Gastrointestinal Positive  N/V,  change in bowel habits, Genitourinary: negative for frequency, urgency, dysuria, hematuria, incontinence Muskuloskeletal : negative for arthralgia, myalgia Hematology: negative for easy bruising, bleeding, lymphadenopathy Dermatological: negative for rash, ulceration, mole change, new lesion Endocrine: negative for hot flashes or polydipsia Neurological: negative for headache, dizziness, confusion, focal weakness, paresthesia, memory loss, gait disturbance Psychological: negative for anxiety, depression, agitation Objective: VITALS:   
Visit Vitals /77 Pulse 69 Temp 97.5 °F (36.4 °C) Resp 20 Ht 5' 5\" (1.651 m) Wt 95.3 kg (210 lb) SpO2 96% BMI 34.95 kg/m² PHYSICAL EXAM:  
General:    Alert, cooperative, no distress, appears stated age. HEENT: Atraumatic, anicteric sclerae, pink conjunctivae No oral ulcers, mucosa moist, throat clear Neck:  Supple, symmetrical,  thyroid: non tender Lungs:   Clear to auscultation bilaterally. No Wheezing or Rhonchi. No rales. Chest wall:  No tenderness  No Accessory muscle use. Heart:   Regular  rhythm,  No  murmur   No edema Abdomen:   Soft, non-tender. Not distended. Bowel sounds normal 
Extremities: No cyanosis. No clubbing Skin:     Not pale. Not Jaundiced  No rashes Psych:  Good insight. Not depressed. Not anxious or agitated. Neurologic: EOMs intact. No facial asymmetry. No aphasia or slurred speech. Symmetrical strength, Alert and oriented X 4. 
_______________________________________________________________________ Care Plan discussed with: 
  Comments Patient  Discussed with patient in room. POC outlined and Questions answered Family  x Children RN x Care Manager Consultant:  reilly ALVAREZ MD  
_______________________________________________________________________ Recommended Disposition:  
Home with Family HH/PT/OT/RN   
SNF/LTC   
ENRRIQUE   
________________________________________________________________________ TOTAL TIME:  65Minutes Critical Care Provided     Minutes non procedure based Comments >50% of visit spent in counseling and coordination of care x Chart review Discussion with patient and/or family and questions answered  
 
________________________________________________________________________ Signed: Alice Garcia MD 
 
This note will not be viewable in [address]  
 
 
Procedures: see electronic medical records for all procedures/Xrays and details which were not copied into this note but were reviewed prior to creation of Plan. LAB DATA REVIEWED:   
Recent Results (from the past 24 hour(s)) EKG, 12 LEAD, INITIAL Collection Time: 01/16/19  2:40 PM  
Result Value Ref Range Ventricular Rate 65 BPM  
 Atrial Rate 65 BPM  
 P-R Interval 198 ms QRS Duration 138 ms Q-T Interval 476 ms QTC Calculation (Bezet) 495 ms Calculated P Axis 44 degrees Calculated R Axis -25 degrees Calculated T Axis 15 degrees Diagnosis Normal sinus rhythm Left ventricular hypertrophy with QRS widening When compared with ECG of 07-DEC-2018 12:35, 
premature ventricular complexes are no longer present Confirmed by Ata Don ([id number]) on 1/16/2019 4:48:36 PM 
  
CBC WITH AUTOMATED DIFF Collection Time: 01/16/19  3:00 PM  
Result Value Ref Range WBC 7.8 3.6 - 11.0 K/uL  
 RBC 3.38 (L) 3.80 - 5.20 M/uL HGB 8.9 (L) 11.5 - 16.0 g/dL HCT 29.7 (L) 35.0 - 47.0 % MCV 87.9 80.0 - 99.0 FL  
 MCH 26.3 26.0 - 34.0 PG  
 MCHC 30.0 30.0 - 36.5 g/dL  
 RDW 13.3 11.5 - 14.5 % PLATELET 323 480 - 681 K/uL MPV 9.0 8.9 - 12.9 FL  
 NRBC 0.0 0  WBC ABSOLUTE NRBC 0.00 0.00 - 0.01 K/uL NEUTROPHILS 69 32 - 75 % LYMPHOCYTES 14 12 - 49 % MONOCYTES 13 5 - 13 % EOSINOPHILS 4 0 - 7 % BASOPHILS 1 0 - 1 % IMMATURE GRANULOCYTES 1 (H) 0.0 - 0.5 % ABS. NEUTROPHILS 5.3 1.8 - 8.0 K/UL  
 ABS. LYMPHOCYTES 1.1 0.8 - 3.5 K/UL  
 ABS. MONOCYTES 1.0 0.0 - 1.0 K/UL  
 ABS. EOSINOPHILS 0.3 0.0 - 0.4 K/UL  
 ABS. BASOPHILS 0.1 0.0 - 0.1 K/UL  
 ABS. IMM. GRANS. 0.1 (H) 0.00 - 0.04 K/UL  
 DF AUTOMATED METABOLIC PANEL, COMPREHENSIVE Collection Time: 01/16/19  3:00 PM  
Result Value Ref Range Sodium 136 136 - 145 mmol/L Potassium 3.7 3.5 - 5.1 mmol/L Chloride 93 (L) 97 - 108 mmol/L  
 CO2 36 (H) 21 - 32 mmol/L Anion gap 7 5 - 15 mmol/L Glucose 153 (H) 65 - 100 mg/dL  BUN 79 (H) 6 - 20 MG/DL  
 Creatinine 2.58 (H) 0.55 - 1.02 MG/DL  
 BUN/Creatinine ratio 31 (H) 12 - 20 GFR est AA 21 (L) >60 ml/min/1.73m2 GFR est non-AA 18 (L) >60 ml/min/1.73m2 Calcium 9.1 8.5 - 10.1 MG/DL Bilirubin, total 1.0 0.2 - 1.0 MG/DL  
 ALT (SGPT) 18 12 - 78 U/L  
 AST (SGOT) 20 15 - 37 U/L Alk. phosphatase 77 45 - 117 U/L Protein, total 8.5 (H) 6.4 - 8.2 g/dL Albumin 3.8 3.5 - 5.0 g/dL Globulin 4.7 (H) 2.0 - 4.0 g/dL A-G Ratio 0.8 (L) 1.1 - 2.2 CK Collection Time: 01/16/19  3:00 PM  
Result Value Ref Range CK 64 26 - 192 U/L  
NT-PRO BNP Collection Time: 01/16/19  3:00 PM  
Result Value Ref Range NT pro- 0 - 450 PG/ML  
TROPONIN I Collection Time: 01/16/19  3:00 PM  
Result Value Ref Range Troponin-I, Qt. <0.05 <0.05 ng/mL LIPASE Collection Time: 01/16/19  3:00 PM  
Result Value Ref Range  Lipase 410 (H) 73 - 393 U/L

## 2019-01-16 NOTE — PROGRESS NOTES
TRANSFER - IN REPORT: 
 
Verbal report received from Zander(name) on Patagonia Abu  being received from ED(unit) for routine progression of care Report consisted of patients Situation, Background, Assessment and  
Recommendations(SBAR). Information from the following report(s) SBAR, Kardex, ED Summary, Intake/Output, MAR and Recent Results was reviewed with the receiving nurse. Opportunity for questions and clarification was provided. Assessment completed upon patients arrival to unit and care assumed.

## 2019-01-17 NOTE — PROGRESS NOTES
Problem: Falls - Risk of 
Goal: *Absence of Falls Document Ca Maldonado Fall Risk and appropriate interventions in the flowsheet. Outcome: Progressing Towards Goal 
Fall Risk Interventions: 
  
 
  
 
  
 
Elimination Interventions: Call light in reach

## 2019-01-17 NOTE — PROGRESS NOTES
Code 44 notice received from . Chart reviewed. Code 44 printed and placed on chart. State OBS and [employer] and given to patient with explanation and copies placed on chart. Nydia Lugo RN, BSN, ACM [id number] [facility]   [phone]

## 2019-01-17 NOTE — CONSULTS
[address] [place] [address] Dilma Epps. 
MR#: [id number] : 1934 ACCOUNT #: [de-identified] DATE OF SERVICE: 2019 REQUESTED BY:  Dr. Dav Sandoval for evaluation and management of acute kidney injury. Patient seen and examined. History obtained from the patient and chart review. HISTORY OF PRESENT ILLNESS:  The patient is an 70-year-old -American female with past medical history significant for CAD, pacemaker/defibrillator, peptic ulcer disease, history of CHF with EF of 40-45%, lupus, hypertension, asthma, who presented to ED with chief complaint of nausea and vomiting. It was nonbloody, associated with loose stools. She also had poor p.o. intake for the last 1-2 weeks. She was recently admitted for CHF exacerbation and discharged on metolazone 3 times a week in addition to her home dose of Lasix. In the ED, vital signs were stable. Labs showed evidence of COTY with a creatinine of 2.58. Renal ultrasound did not show any obstruction. She was started on IV fluids. Her diuretics were held. Lisinopril is also on hold. She denies taking any NSAIDs. No fevers or chills. Denies cough or hemoptysis. No skin rash. No new antibiotics. She is feeling better. Blood pressure is low normal.  There are no repeat labs today. She did not receive any IV contrast procedure. REVIEW OF SYSTEMS:  As noted above. Remainder of review of system obtained and negative. Past Surgical History:  
Procedure Laterality Date  CARDIAC SURG PROCEDURE UNLIST    
 pacemaker/defibrilator.  HX GYN    
 BTL  
 HX PACEMAKER    
 LA COLONOSCOPY W/BIOPSY SINGLE/MULTIPLE  3/26/2012  LA COLSC FLX W/RMVL OF TUMOR POLYP LESION SNARE TQ  3/26/2012 Allergies Allergen Reactions  Contrast Agent [Iodine] Anaphylaxis Made aware of allergy 13  Sulfa (Sulfonamide Antibiotics) Hives  Codeine Anxiety  Pcn [Penicillins] Anaphylaxis Reaction was to penicillin injections into buttocks. She can take oral amoxicillin No medications prior to admission. Social History Tobacco Use  Smoking status: Never Smoker  Smokeless tobacco: Never Used Substance Use Topics  Alcohol use: No  
  Alcohol/week: 0.0 oz  Drug use: No  
 
Family History Problem Relation Age of Onset  Arrhythmia Mother  Hypertension Mother  Hypertension Father PHYSICAL EXAMINATION: 
GENERAL:  Elderly female, well built, well nourished, in no acute distress. VITAL SIGNS:  She is afebrile, pulse 78, /58, respiration of 16, saturating 100% on room air, weight is 95.3 kilo. HEENT:  Atraumatic, normocephalic. Conjunctivae slightly pale. Mucous membranes are moist.  Sclerae are anicteric. NECK:  No JVD in semisupine position. No carotid bruit. LUNGS:  Clear to auscultation. HEART:  Regular rate and rhythm, systolic murmur II/VI at left sternal border. No rub. ABDOMEN:  Soft, nontender, active bowel sounds. No hepatosplenomegaly. EXTREMITIES:  No significant peripheral edema. No clubbing or cyanosis. CENTRAL NERVOUS SYSTEM:  Alert and oriented x3. LABORATORY DATA:  CBC shows anemia with hemoglobin of 8.9, otherwise normal.  Chem panel shows a BUN of 79, creatinine of 2.58, CO2 elevated at 36, but it is better than previous labs from December. She seems to have chronic high bicarbonate. LFTs were nearly normal.  Alkaline phosphatase was slightly elevated at 410. CK was normal.  Troponin I was normal.  CT scan of chest from last admission showed enlarged heart. Large left thyroid mass. CT of abdomen and pelvis was not done during this admit. Abdominal x-ray did not show any acute findings. Renal ultrasound showed small kidneys with evidence of medical renal disease, but no evidence for hydronephrosis.   Right kidney measured 8.2 cm and left kidney measures 8.6 cm.  Echocardiogram from 12/2018 showed EF of 40-45%, mild diffuse hypokinesis, RV volume overload with reduced RV systolic function, moderate to severe MR and mild to moderate TR. 
 
ASSESSMENT: 
1. Acute kidney injury. Etiology appears most likely to be prerenal azotemia from combination of diuretics including Lasix plus metolazone, lisinopril with associated nausea and vomiting. I expect quick resolution. Obstruction is already excluded. UA also shows bland sediment. 2.  Hypertension. Blood pressure is low normal. 
3.  Alkalosis. I suspect she has combination of secondary metabolic alkalosis from a compensated chronic respiratory acidosis given that her CO2 is chronically elevated. She may also have a slight component of contraction alkalosis from diuresis. 4.  Nausea/vomiting, which seems to have improved. Suspect viral gastroenteritis. 5.  Chronic systolic congestive heart failure/moderate to severe mitral regurgitation/mild to moderate tricuspid regurgitation. She is clinically compensated for now. 6.  Chronic respiratory failure with hypoxia on home oxygen. 7.  Small bilateral kidneys. RECOMMENDATIONS: 
1. Continue gentle isotonic IV fluid as you are at 50 mL per hour. 2.  Hold diuretics for now. She will need reduced dose at the time of discharge. 3.  Agree with holding lisinopril for now. 4.  Reduce the dose of Coreg to avoid iatrogenic hypotension. 5.  Check labs in morning. 6.  Monitor I's and O's, avoid nephrotoxins. 7.  Renally adjust medications, were indicated. 8.  If renal function is better by tomorrow and otherwise stable would be okay for DC in a.m. Discussed with the patient and family. Thanks for renal consult. Cathlean Lesch, MD 
  
 
BP / Gokul Jain 
D: 01/17/2019 13:18    
T: 01/17/2019 13:51 
JOB #: [id number]

## 2019-01-17 NOTE — PROGRESS NOTES
Hospitalist Progress Note NAME: Cassie Jeffrey :  1934 MRN:  [id number] Assessment / Plan: 
Acute kidney injury, most likely prerenal POA Gentle hydration, lungs clear  
consulted nephrology Renal ultrasound is negative for hydronephrosis Hold diuretics, hold ACE inhibitor.   
 
Intractable nausea and vomiting POA Diarrhea, viral Gastroenteritis? No fever on and no leukocytosis, most likely viral 
We will hold off on antibiotics, continue IV fluid Sent for stool culture and stain, C. Difficile sent by ED  ( doesn't meet criteria as stools soft not watery) Symptomatic treatment with Zofran Daily BMP Systolic CHF, not in decompensation Last EF was 40-45% in 2018, We will hold Lasix and metolazone We will continue with Coreg Chronic respiratory failure with hypoxia, on home oxygen Secondary to asthma/COPD not in acute exacerbation Continue with fluticasone Ellipta and as needed albuterol 
  
Body mass index is 34.95 kg/m². therefore classifying patient as obese, NOS (BMI of >30 kg/m2) 
-counseled exercise/diet. Patient receptive. 
  
I have personally reviewed the radiographs, laboratory data in Epic and decisions and statements above are based partially on this personal interpretation. 
  
Code Status: Full Code DVT Prophylaxis: Hep SQ 
GI Prophylaxis: not indicated Subjective: Chief Complaint / Reason for Physician Visit Patient reports improvement, no vomiting, Bowel movement normal now. Discussed with RN events overnight. Review of Systems: 
Symptom Y/N Comments  Symptom Y/N Comments Fever/Chills    Chest Pain Poor Appetite    Edema Cough    Abdominal Pain Sputum    Joint Pain SOB/VILLAR    Pruritis/Rash Nausea/vomit    Tolerating PT/OT Diarrhea    Tolerating Diet Constipation    Other Could NOT obtain due to:   
 
Objective: VITALS:  
Last 24hrs VS reviewed since prior progress note. Most recent are: Patient Vitals for the past 24 hrs: 
 Temp Pulse Resp BP SpO2  
01/17/19 1146 98.9 °F (37.2 °C) 78 18 108/58 98 % 01/17/19 0805 98.6 °F (37 °C) 75 16 117/61 100 % 01/17/19 0315 98.9 °F (37.2 °C) 77 16 124/58 100 % 01/16/19 2248 97.8 °F (36.6 °C) 77 16 119/66 99 % 01/16/19 1943 97.7 °F (36.5 °C) 78 18 136/65 99 % 01/16/19 1830    133/84 100 % 01/16/19 1829     100 % 01/16/19 1753 97.5 °F (36.4 °C)      
01/16/19 1740    150/77 96 % 01/16/19 1600    128/69 100 % 01/16/19 1545    132/81 97 % 01/16/19 1433 97.2 °F (36.2 °C) 69 20 129/73 98 % Intake/Output Summary (Last 24 hours) at 1/17/2019 1313 Last data filed at 1/17/2019 1088 Gross per 24 hour Intake 120 ml Output 350 ml Net -230 ml PHYSICAL EXAM: 
General: WD, WN. Alert, cooperative, no acute distress   
EENT:  EOMI. Anicteric sclerae. MMM Resp:  CTA bilaterally, no wheezing or rales. No accessory muscle use CV:  Regular  rhythm,  No edema GI:  Soft, Non distended, Non tender.  +Bowel sounds Neurologic:  Alert and oriented X 3, normal speech, Psych:   Good insight. Not anxious nor agitated Skin:  No rashes. No jaundice Reviewed most current lab test results and cultures  YES Reviewed most current radiology test results   YES Review and summation of old records today    NO Reviewed patient's current orders and MAR    YES 
PMH/SH reviewed - no change compared to H&P 
________________________________________________________________________ Care Plan discussed with: 
  Comments Patient Family RN Care Manager Consultant Multidiciplinary team rounds were held today with , nursing, pharmacist and clinical coordinator. Patient's plan of care was discussed; medications were reviewed and discharge planning was addressed. ________________________________________________________________________ Total NON critical care TIME:  35 Minutes Total CRITICAL CARE TIME Spent:   Minutes non procedure based Comments >50% of visit spent in counseling and coordination of care    
________________________________________________________________________ Lorraine Galeas MD  
 
Procedures: see electronic medical records for all procedures/Xrays and details which were not copied into this note but were reviewed prior to creation of Plan. LABS: 
I reviewed today's most current labs and imaging studies. Pertinent labs include: 
Recent Labs  
  01/16/19 
1500 WBC 7.8 HGB 8.9* HCT 29.7*  
 Recent Labs  
  01/16/19 
1500   
K 3.7 CL 93* CO2 36* * BUN 79* CREA 2.58* CA 9.1 ALB 3.8 TBILI 1.0  
SGOT 20 ALT 18 Signed: Lorraine Galeas MD

## 2019-01-17 NOTE — CONSULTS
[id number]- consult dictated A: 
COTY 
HTN Chronic syst CHF (EF of 40-45%)/mod to severe MR/mild to mod TR/RV volume overload (Based on Echo from Dec 2018) Alkalosis N/V- ? Viral  
 
P: 
Ct IV NS at 50 ml/hr Reduce Coreg to 12.5 mg BID to avoid hypotension- BP is low normal 
Hold diuretics/ACE-I as you are AM labs If stable Cr/BP- would be ok for d/c tomm Would stop Metolazone on d/c-resume as outpt Resume Lasix at time of d/c at lower dose or same dose- with titration as outpt Lidna Lund MD 
[address] [employer]

## 2019-01-17 NOTE — PROGRESS NOTES
Oncology Nursing Communication Tool 2:59 PM 
1/17/2019 Bedside shift change report given to Marina Nation RN (incoming nurse) by Taylor Avalos RN (outgoing nurse) on [facility]. Report included the following information SBAR, Kardex, Intake/Output, MAR and Recent Results. Shift Summary: still needs stool collected. 24 hour urine collection ends at 930 pm. 
  
 Issues for physician to address: none Oncology Shift Note Admission Date 1/16/2019 Admission Diagnosis COTY (acute kidney injury) ([facility] [address] Code Status Full Code Consults IP CONSULT TO HOSPITALIST 
IP CONSULT TO NEPHROLOGY Cardiac Monitoring [x] Yes [] No  
  
Purposeful Hourly Rounding [x] Yes   
Idalmis Score Total Score: 2 Idalmis score 3 or > [] Bed Alarm [] Avasys [] 1:1 sitter [] Patient refused (Place signed refusal form in chart) Pain Managed [x] Yes [] No  
 Key Pain Meds   
    
  
 acetaminophen (TYLENOL ARTHRITIS PAIN) 650 mg CR tablet  (Taking) Take 650 mg by mouth every six (6) hours as needed for Pain. Influenza Vaccine Received Flu Vaccine for Current Season (usually Sept-March): Yes Oxygen needs? [] Room air Oxygen @  []1L    [x]2L    []3L   []4L    []5L   []6L Use home O2? [x] Yes [] No 
Perform O2 challenge test using  smartphrase (.oxygenchallenge) Last bowel movement Last Bowel Movement Date: 01/16/19 
bowel movement Urinary Catheter LDAs Peripheral IV 01/16/19 Left Antecubital (Active) Site Assessment Clean, dry, & intact 1/17/2019  7:40 AM  
Phlebitis Assessment 0 1/17/2019  7:40 AM  
Infiltration Assessment 0 1/17/2019  7:40 AM  
Dressing Status Clean, dry, & intact 1/17/2019  7:40 AM  
Dressing Type Transparent 1/17/2019  7:40 AM  
Hub Color/Line Status Pink; Infusing 1/17/2019  7:40 AM  
Action Taken Blood drawn 1/16/2019  4:15 PM  
Alcohol Cap Used No 1/17/2019  7:40 AM  
                  
  
 Readmission Risk Assessment Tool Score High Risk   
      
 22 Total Score 3 Has Seen PCP in Last 6 Months (Yes=3, No=0)  
 4 IP Visits Last 12 Months (1-3=4, 4=9, >4=11) 5 Pt. Coverage (Medicare=5 , Medicaid, or Self-Pay=4) 10 Charlson Comorbidity Score (Age + Comorbid Conditions) Criteria that do not apply:  
 . Living with Significant Other. Assisted Living. LTAC. SNF. or  
Rehab Patient Length of Stay (>5 days = 3) Expected Length of Stay 3d 4h Actual Length of Stay 1 Florentino Escalera RN

## 2019-01-17 NOTE — PROGRESS NOTES
Oncology Nursing Communication Tool 6:54 AM 
1/17/2019 Bedside and Verbal shift change report given to Ivanna RN (incoming nurse) by Melisa Good RN (outgoing nurse) on [facility]. Report included the following information SBAR and Kardex. Shift Summary: pt admission database done, 24hr urine collection started Issues for physician to address: nephrology Oncology Shift Note Admission Date 1/16/2019 Admission Diagnosis COTY (acute kidney injury) ([facility] [address] Code Status Full Code Consults IP CONSULT TO HOSPITALIST 
IP CONSULT TO NEPHROLOGY Cardiac Monitoring [x] Yes [] No  
  
Purposeful Hourly Rounding [x] Yes   
Idalmis Score Total Score: 2 Idalmis score 3 or > [] Bed Alarm [] Avasys [] 1:1 sitter [] Patient refused (Place signed refusal form in chart) Pain Managed [x] Yes [] No  
 Key Pain Meds   
    
  
 acetaminophen (TYLENOL ARTHRITIS PAIN) 650 mg CR tablet Take 650 mg by mouth every six (6) hours as needed for Pain. Influenza Vaccine Received Flu Vaccine for Current Season (usually Sept-March): Yes Oxygen needs? [] Room air Oxygen @  []1L    [x]2L    []3L   []4L    []5L   []6L Use home O2? [x] Yes [] No 
Perform O2 challenge test using  smartphrase (.oxygenchallenge) Last bowel movement Last Bowel Movement Date: 01/16/19 
bowel movement Urinary Catheter LDAs Peripheral IV 01/16/19 Left Antecubital (Active) Site Assessment Clean, dry, & intact 1/17/2019  3:05 AM  
Phlebitis Assessment 0 1/17/2019  3:05 AM  
Infiltration Assessment 0 1/17/2019  3:05 AM  
Dressing Status Clean, dry, & intact 1/17/2019  3:05 AM  
Dressing Type Transparent;Tape 1/17/2019  3:05 AM  
Hub Color/Line Status Pink 1/17/2019  3:05 AM  
Action Taken Blood drawn 1/16/2019  4:15 PM  
Alcohol Cap Used No 1/16/2019  4:15 PM  
                  
  
Readmission Risk Assessment Tool Score High Risk   
      
 22 Total Score 3 Has Seen PCP in Last 6 Months (Yes=3, No=0)  
 4 IP Visits Last 12 Months (1-3=4, 4=9, >4=11) 5 Pt. Coverage (Medicare=5 , Medicaid, or Self-Pay=4) 10 Charlson Comorbidity Score (Age + Comorbid Conditions) Criteria that do not apply:  
 . Living with Significant Other. Assisted Living. LTAC. SNF. or  
Rehab Patient Length of Stay (>5 days = 3) Expected Length of Stay - - - Actual Length of Stay 1 Damaris Sandoval RN

## 2019-01-18 NOTE — DISCHARGE INSTRUCTIONS
Patient Education     Please call and make appointment with your primary physician and cardiologist soon after discharge your fluid pills and blood pressure medication  need to be adjusted. Managing Other Conditions When You Have Heart Failure: Care Instructions  Your Care Instructions    All the systems in your body rely on each other to work properly. Heart failure has effects all through your body that can lead to other problems, such as kidney disease. The reverse is also true. A condition like diabetes or lung disease can damage or stress your heart and cause heart failure. Managing any other problems can help reduce your heart's workload and make your heart failure better. Conditions that commonly cause or occur along with heart failure include high blood pressure, diabetes, COPD, high cholesterol, kidney problems, anemia, and arthritis. Follow-up care is a key part of your treatment and safety. Be sure to make and go to all appointments, and call your doctor if you are having problems. It's also a good idea to know your test results and keep a list of the medicines you take. How can you care for yourself at home? Steps to help with heart failure and other problems  · Eat less salt (sodium). This helps keep fluid from building up. It may help you feel better. Limiting sodium can also help if you have high blood pressure or kidney disease. · Watch your fluid intake if your doctor tells you to. Reducing fluids can ease your heart's workload and keep your sodium level in balance. · Get regular exercise. Regular, moderate exercise, such as walking, helps your heart. It can also help lower your blood pressure, lower stress, and help you lose weight. · Lose weight if you are overweight. Losing weight can help you manage diabetes, lower your blood pressure and cholesterol level, and reduce the workload on your heart. · Stop smoking.  Smoking stresses your lungs, interferes with healing, and can make heart failure worse. · Limit alcohol. Alcohol can raise your blood pressure. Ask your doctor how much, if any, is safe. If your doctor has not set you up with a cardiac rehabilitation (rehab) program, talk to him or her about whether that is right for you. Cardiac rehab includes exercise, help with diet and lifestyle changes, and emotional support. To stay as healthy as possible  · Work closely with your doctor. Have all your tests, and go to all your appointments. · Take your medicines exactly as prescribed. You will take medicines to treat the other conditions you have along with heart failure. It can be hard to balance the treatment for all your conditions. You will need to have follow-up tests to make sure that all your medicines are working well together. Talk to your doctor if you have any problems with your medicine. · Keep all your doctors informed about your health problems and all the medicines you take for them. Medicines that can treat one condition may make another condition worse. · Talk to your doctor before you take any vitamins, over-the-counter drugs, or herbal products. Do not take aspirin, ibuprofen (Advil, Motrin), or naproxen (Aleve) unless you talk to your doctor first. They could make your heart failure and other problems worse. When should you call for help? Call 911 if you have symptoms of sudden heart failure, such as:    · You have severe trouble breathing.     · You cough up pink, foamy mucus.     · You have a new irregular or rapid heartbeat.    Call 911 if you have symptoms of a heart attack.  These may include:    · Chest pain or pressure, or a strange feeling in the chest.     · Sweating.     · Shortness of breath.     · Nausea or vomiting.     · Pain, pressure, or a strange feeling in the back, neck, jaw, or upper belly or in one or both shoulders or arms.     · Lightheadedness or sudden weakness.     · A fast or irregular heartbeat.    After you call 911, the  may tell you to chew 1 adult-strength or 2 to 4 low-dose aspirin. Wait for an ambulance. Do not try to drive yourself.   Call your doctor now or seek immediate medical care if:    · You have new or increased shortness of breath.     · You are dizzy or lightheaded, or you feel like you may faint.     · You have sudden weight gain, such as more than 2 to 3 pounds in a day or 5 pounds in a week. (Your doctor may suggest a different range of weight gain.)     · You have increased swelling in your legs, ankles, or feet.     · You are suddenly so tired or weak that you cannot do your usual activities.    Watch closely for changes in your health, and be sure to contact your doctor if you develop new symptoms. Where can you learn more? Go to http://nikki-elizabeth.info/. Enter P052 in the search box to learn more about \"Managing Other Conditions When You Have Heart Failure: Care Instructions. \"  Current as of: July 22, 2018  Content Version: 11.9  © 4868-0144 [employer]. Care instructions adapted under license by [employer] (which disclaims liability or warranty for this information). If you have questions about a medical condition or this instruction, always ask your healthcare professional. [place] [address] any warranty or liability for your use of this information. Patient Education        Home Blood Pressure Test: About This Test  What is it? A home blood pressure test allows you to keep track of your blood pressure at home. Blood pressure is a measure of the force of blood against the walls of your arteries. Blood pressure readings include two numbers, such as 130/80 (say \"130 over 80\"). The first number is the systolic pressure. The second number is the diastolic pressure. Why is this test done? You may do this test at home to:  · Find out if you have high blood pressure. · Track your blood pressure if you have high blood pressure.   · Track how well medicine is working to reduce high blood pressure. · Check how lifestyle changes, such as weight loss and exercise, are affecting blood pressure. How can you prepare for the test?  · Do not use caffeine, tobacco, or medicines known to raise blood pressure (such as nasal decongestant sprays) for at least 30 minutes before taking your blood pressure. · Do not exercise for at least 30 minutes before taking your blood pressure. What happens before the test?  Take your blood pressure while you feel comfortable and relaxed. Sit quietly with both feet on the floor for at least 5 minutes before the test.  What happens during the test?  · Sit with your arm slightly bent and resting on a table so that your upper arm is at the same level as your heart. · Roll up your sleeve or take off your shirt to expose your upper arm. · Wrap the blood pressure cuff around your upper arm so that the lower edge of the cuff is about 1 inch above the bend of your elbow. Proceed with the following steps depending on if you are using an automatic or manual pressure monitor. Automatic blood pressure monitors  · Press the on/off button on the automatic monitor and wait until the ready-to-measure \"heart\" symbol appears next to zero in the display window. · Press the start button. The cuff will inflate and deflate by itself. · Your blood pressure numbers will appear on the screen. · Write your numbers in your log book, along with the date and time. Manual blood pressure monitors  · Place the earpieces of a stethoscope in your ears, and place the bell of the stethoscope over the artery, just below the cuff. · Close the valve on the rubber inflating bulb. · Squeeze the bulb rapidly with your opposite hand to inflate the cuff until the dial or column of mercury reads about 30 mm Hg higher than your usual systolic pressure.  If you do not know your usual pressure, inflate the cuff to 210 mm Hg or until the pulse at your wrist disappears. · Open the pressure valve just slightly by twisting or pressing the valve on the bulb. · As you watch the pressure slowly fall, note the level on the dial at which you first start to hear a pulsing or tapping sound through the stethoscope. This is your systolic blood pressure. · Continue letting the air out slowly. The sounds will become muffled and will finally disappear. Note the pressure when the sounds completely disappear. This is your diastolic blood pressure. Let out all the remaining air. · Write your numbers in your log book, along with the date and time. What else should you know about the test?  It is more accurate to take the average of several readings made throughout the day than to rely on a single reading. It's normal for blood pressure to go up and down throughout the day. Follow-up care is a key part of your treatment and safety. Be sure to make and go to all appointments, and call your doctor if you are having problems. It's also a good idea to keep a list of the medicines you take. Where can you learn more? Go to http://nikki-elizabeth.info/. Enter C427 in the search box to learn more about \"Home Blood Pressure Test: About This Test.\"  Current as of: July 22, 2018  Content Version: 11.9  © 4130-8530 [employer]. Care instructions adapted under license by [employer] (which disclaims liability or warranty for this information). If you have questions about a medical condition or this instruction, always ask your healthcare professional. [place] [address] any warranty or liability for your use of this information.

## 2019-01-18 NOTE — PROGRESS NOTES
Problem: Heart Failure: Day 3 Goal: *Oxygen saturation within defined limits Outcome: Progressing Towards Goal 
O2 saturations remain within desired limits, pt is getting 2-3 liters NC as needed. Pt needs well expressed

## 2019-01-18 NOTE — PROGRESS NOTES
PCP RM appt scheduled with Dr. Gwendolyn Mathews on 1/21/2019 at 1:40pm. Appt added to AVS. Jvaad Hernandez CM Specialist

## 2019-01-18 NOTE — PROGRESS NOTES
While taking patient vital sign blood pressure reading on the machine was 84/49. Rechecked blood pressure manually reading was 92/50. Both blood pressure reading was performed in the right arm due to IV infusing in left arm. Patient became very anxious concerning low blood pressure reading, after several attempts was made to explain why her blood pressure may have lower d/t earlier dose of Coreg 12.5 mg PO and patient was informed that her blood pressure will be closely monitor. Patient wanted nurse to call the Physician concerning her low blood pressure and she wanted to speak with the charge nurse. Charge nurse was notified and a Physician note will be placed for the on-call Hospitalist to call the unit concerning patient low blood pressure per patient request. Patient was asked if she felt any different while her blood pressure was low and she stated \"No\". Patient also stated \"You going to let me die\". Nurse assigned to the patient explain several times to the patient that her blood pressure will continually be monitor and the Physician will be notified right away with any major changes. Patient remain asymptomatic

## 2019-01-18 NOTE — ADT AUTH CERT NOTES
Patient Demographics Patient Name Radha Yarbrough 
[id number] Sex Female  
1934 Address [address] [place] [id number] Phone [phone] (Home) [phone] (Mobile) All ADT Orders (ADT stands for Admission Discharge Transfer) Collapse  Hide (From admission, onward) Start   Ordered 19 1545  INITIAL PHYSICIAN ORDER: OBSERVATION/OUTPATIENT IN A BED OBSERVATION; Telemetry; COTY (ADMISSION ORDERS) ONE TIME    
Authorizing Provider: Bridgette Sparrow MD   
User who entered the order: Talya Number, RN   
  
Question Answer Comment Patient Class: OBSERVATION Type of Bed Telemetry Reason for Observation COTY Admitting Diagnosis COTY (acute kidney injury) ([facility] [address] Admitting Physician Marianne Severs Attending Physician Zo Bustamante   
  
 19 1543  
19 1730  INITIAL PHYSICIAN ORDER: INPATIENT Telemetry; 3. Patient receiving treatment that can only be provided in an inpatient setting (further clarification in H&P documentation) (ADMISSION ORDERS) ONE TIME    
Authorizing Provider: Abrahan Moran MD   
User who entered the order: Abrahan Moran MD   
  
References: CLICK HERE-** FAQ-NEW CMS RULES FOR INPATIENT CERTIFICATION **  
Question Answer Comment Status: INPATIENT Type of Bed Telemetry Inpatient Hospitalization Certified Necessary for the Following Reasons 3. Patient receiving treatment that can only be provided in an inpatient setting (further clarification in H&P documentation) Admitting Diagnosis COTY (acute kidney injury) ([facility] [address] Admitting Physician Marianne Severs Attending Physician Marianne Severs Estimated Length of Stay 3-4 Midnights Discharge Plan: Other (Specify)

## 2019-01-18 NOTE — PROGRESS NOTES
I have reviewed discharge instructions with the patient and caregiver. The patient and caregiver verbalized understanding. Discharge medications reviewed with patient and caregiver and appropriate educational materials and side effects teaching were provided. Follow-up appointments reviewed with patient. Opportunity for questions or concerns given. Venous access removed without difficulty, pt tolerated well. Patients belongings gathered and sent with patient. Patient is ready for discharge. Pt given med instructions and dced with red folder containing AVS.  Pt has O2 bottle that they will return.

## 2019-01-18 NOTE — PROGRESS NOTES
Name: Rob Loya MRN: [id number] : 1934 Assessment: 
COTY 
HTN Chronic syst CHF (EF of 40-45%)/mod to severe MR/mild to mod TR/RV volume overload (Based on Echo from Dec 2018) Alkalosis N/V- ? Viral  
  
COTY is improving. BP is lowish. Coreg held. Alkalosis better Plan/Recommendations: 
Ct IV NS at 50 ml/hr Reduce Coreg even further to 6.25 mg BID to avoid hypotension Hold diuretics/ACE-I as you are If stable BP later in day, would be ok for d/c later today or tomm from renal standpoint Would stop Metolazone on d/c-resume as outpt Resume Lasix at time of d/c at lower dose or same dose- with titration as outpt Subjective: 
Resting. Upset about low BP Taking po-but appetite ok ROS:  
No nausea, no vomiting No chest pain, no shortness of breath Exam: 
Visit Vitals /76 (BP 1 Location: Right arm, BP Patient Position: At rest) Pulse 72 Temp 98.8 °F (37.1 °C) Resp 18 Ht 5' 5\" (1.651 m) Wt 94.3 kg (207 lb 14.3 oz) SpO2 100% BMI 34.60 kg/m² Elderly, WB/WN in NAD 
AT/NC, no icterus, mmm Clear RRR, distant Tr edema AOx3 Current Facility-Administered Medications Medication Dose Route Frequency Last Dose  potassium chloride (KLOR-CON) packet for solution 20 mEq  20 mEq Oral BID WITH MEALS 20 mEq at 19 4870  carvedilol (COREG) tablet 6.25 mg  6.25 mg Oral BID WITH MEALS    
 acetaminophen (TYLENOL) tablet 650 mg  650 mg Oral Q6H PRN    
 albuterol (PROVENTIL HFA, VENTOLIN HFA, PROAIR HFA) inhaler 1-2 Puff  1-2 Puff Inhalation Q4H PRN    
 fluticasone furoate (ARNUITY ELLIPTA) 100 mcg/puff  1 Puff Inhalation DAILY 1 Puff at 19 0830  
 atorvastatin (LIPITOR) tablet 40 mg  40 mg Oral DAILY 40 mg at 19 0822  
 pantoprazole (PROTONIX) tablet 40 mg  40 mg Oral DAILY 40 mg at 19 6553  sodium chloride (NS) flush 5-40 mL  5-40 mL IntraVENous Q8H 10 mL at 01/18/19 0557  sodium chloride (NS) flush 5-40 mL  5-40 mL IntraVENous PRN    
 heparin (porcine) injection 5,000 Units  5,000 Units SubCUTAneous Q8H 5,000 Units at 01/18/19 0212  
 0.9% sodium chloride infusion  50 mL/hr IntraVENous CONTINUOUS 50 mL/hr at 01/17/19 1536  ondansetron (ZOFRAN) injection 4 mg  4 mg IntraVENous Q6H PRN Facility-Administered Medications Ordered in Other Encounters Medication Dose Route Frequency Last Dose  ADDaptor  vancomycin (VANCOCIN) 1,000 mg injection  0.9% sodium chloride (MBP/ADV) 0.9 % infusion  bacitracin 50,000 unit injection Labs/Data: 
 
Lab Results Component Value Date/Time WBC 7.4 01/18/2019 03:00 AM  
 HGB 8.3 (L) 01/18/2019 03:00 AM  
 HCT 27.0 (L) 01/18/2019 03:00 AM  
 PLATELET 494 23/99/3809 03:00 AM  
 MCV 87.7 01/18/2019 03:00 AM  
 
 
Lab Results Component Value Date/Time Sodium 135 (L) 01/18/2019 03:00 AM  
 Potassium 3.2 (L) 01/18/2019 03:00 AM  
 Chloride 94 (L) 01/18/2019 03:00 AM  
 CO2 34 (H) 01/18/2019 03:00 AM  
 Anion gap 7 01/18/2019 03:00 AM  
 Glucose 92 01/18/2019 03:00 AM  
 BUN 63 (H) 01/18/2019 03:00 AM  
 Creatinine 1.56 (H) 01/18/2019 03:00 AM  
 BUN/Creatinine ratio 40 (H) 01/18/2019 03:00 AM  
 GFR est AA 38 (L) 01/18/2019 03:00 AM  
 GFR est non-AA 32 (L) 01/18/2019 03:00 AM  
 Calcium 8.5 01/18/2019 03:00 AM  
 
 
Wt Readings from Last 3 Encounters:  
01/18/19 94.3 kg (207 lb 14.3 oz) 12/19/18 90.7 kg (199 lb 14.4 oz) 12/10/18 91.3 kg (201 lb 4.5 oz) Intake/Output Summary (Last 24 hours) at 1/18/2019 6477 Last data filed at 1/17/2019 2344 Gross per 24 hour Intake 1442.5 ml Output  Net 1442.5 ml Patient seen and examined. Chart reviewed. Labs, data and other pertinent notes reviewed in last 24 hrs. PMH/SH/FH reviewed and unchanged compared to H&P Discussed with pt/RN 
 
 
 Linda Lund MD

## 2019-01-18 NOTE — PROGRESS NOTES
Spoke with Dr Norbert Hendrix Hospitalist  On call concerning patient low blood pressure manually of 92/50 after she received Coreg 12.5 mg PO earlier this shift. Dr Norbert Hendrix was informed that the patient was informed that the nursing staff will continue to monitor her blood pressure and notify the Physician if her blood pressure continue to drop, but the patient insisted that I call the Physician concerning this matter. No new orders was given just for the nursing staff to continue to monitor the patient blood pressure.

## 2019-01-18 NOTE — PROGRESS NOTES
Asymptomatic BP of 92/50 after receiving coreg. continue to monitor. Already on IV of NS at 50 cc/hr

## 2019-01-18 NOTE — DISCHARGE SUMMARY
Hospitalist Discharge Summary Patient ID: 
Lacho De Leon [id number] 
47 y.o. 
1934 PCP on record: Ольга Muñiz MD 
 
Admit date: 1/16/2019 Discharge date and time: 1/18/2019 DISCHARGE DIAGNOSIS: 
COTY 
intractable nausea and vomiting CHF 
 
 
CONSULTATIONS: 
IP CONSULT TO HOSPITALIST 
IP CONSULT TO NEPHROLOGY Excerpted HPI from H&P of Meme Phan MD: 
to ED with complaint noted above. Available records were reviewed at the time of H&P. This is a 80-year-old female with past medical history of SSS status post pacemaker, CAD, GERD PUD, lupus, asthma , hypertension who was sent to the ED for multiple episodes of nonbloody nausea and vomiting associated with loose stools since this morning.  Most of the HPI was obtained from 1 of the children, who reported that her mom has decreased p.o. intake for the past 1 or 2 weeks, was recently admitted for CHF exacerbation, discharged on metolazone 3 times a week in addition to her home dose Lasix. In the ED, vital signs were stable labs showed AK I with creatinine of 2.58 . 
  Abdominal x-ray was negative for any acute obstruction and  renal ultrasound showed no hydronephrosis. We were asked to see for work up and evaluation of the above problems.  
  
 
 
______________________________________________________________________ DISCHARGE SUMMARY/HOSPITAL COURSE:  for full details see H&P, daily progress notes, labs, consult notes. Acute kidney injury, most likely prerenal POA Gentle hydration, lungs clear  
consulted nephrology Renal ultrasound is negative for hydronephrosis Hold diuretics, hold ACE inhibitor.   
 
Intractable nausea and vomiting POA Diarrhea, viral Gastroenteritis? No fever on and no leukocytosis, most likely viral 
We will hold off on antibiotics, continue IV fluid Sent for stool culture and stain, C. Difficile sent by ED  ( doesn't meet criteria as stools soft not watery) Symptomatic treatment with Zofran Daily BMP Resolved Systolic CHF, not in decompensation Last EF was 40-45% in December 2018, We will hold Lasix and metolazone We will continue with Coreg Not hypervolemic, euvolemic Chronic respiratory failure with hypoxia, on home oxygen Secondary to asthma/COPD not in acute exacerbation Continue with fluticasone Ellipta and as needed albuterol Stable no wheezing lungs clear on discharge  
  
Body mass index is 34.95 kg/m². therefore classifying patient as obese, NOS (BMI of >30 kg/m2) 
-counseled exercise/diet. Patient receptive. 
  
I have personally reviewed the radiographs, laboratory data in Epic and decisions and statements above are based partially on this personal interpretation. 
  
Code Status: Full Code DVT Prophylaxis: Hep SQ 
GI Prophylaxis: not indicated 
 
 
 
 
_______________________________________________________________________ Patient seen and examined by me on discharge day. Pertinent Findings: 
Gen:    Not in distress Chest: Clear lungs CVS:   Regular rhythm. No edema Abd:  Soft, not distended, not tender Neuro:  Alert, oriented  
_______________________________________________________________________ DISCHARGE MEDICATIONS:  
Current Discharge Medication List  
  
CONTINUE these medications which have CHANGED Details  
carvedilol (COREG) 6.25 mg tablet Take 1 Tab by mouth two (2) times daily (with meals). Qty: 60 Tab, Refills: 0  
  
furosemide (LASIX) 40 mg tablet Take 1 Tab by mouth two (2) times a day. Qty: 30 Tab, Refills: 0 CONTINUE these medications which have NOT CHANGED Details ARNUITY ELLIPTA 100 mcg/actuation dsdv inhaler INHALE 1 PUFF PO Q 24 H Refills: 4  
  
albuterol (PROVENTIL HFA, VENTOLIN HFA, PROAIR HFA) 90 mcg/actuation inhaler Take 1-2 Puffs by inhalation every four (4) hours as needed for Wheezing or Shortness of Breath.   
  
mv-mn/folic acid/calcium/vit K (ONE-A-DAY WOMEN'S 50 PLUS PO) Take 1 Tab by mouth daily. atorvastatin (LIPITOR) 40 mg tablet TAKE 1 TABLET BY MOUTH DAILY Qty: 90 Tab, Refills: 3  
  
budesonide (PULMICORT) 180 mcg/actuation aepb inhaler Take 2 puffs by inhalation two (2) times a day. acetaminophen (TYLENOL ARTHRITIS PAIN) 650 mg CR tablet Take 650 mg by mouth every six (6) hours as needed for Pain. cetirizine (ZYRTEC) 10 mg tablet Take 10 mg by mouth daily. nitroglycerin (NITROSTAT) 0.4 mg SL tablet USE 1 TABLET UNDER THE TONGUE EVERY 5 MINUTES AS NEEDED FOR CHEST PAIN CALL 911 IF NOT RELIEVED BY 3 TABLETS Qty: 50 Tab, Refills: 0  
  
potassium chloride SR (KLOR-CON 10) 10 mEq tablet Take 10 mEq by mouth daily. Patient takes 10 mEq daily and 20 mEq QHS Psyllium Husk-Sucrose (FIBER, PSYLLIUM HUSK/SUGAR,) 3.4 gram/11 gram powd Take 1 Cap by mouth daily. NEXIUM 40 mg capsule TAKE ONE CAPSULE BY MOUTH DAILY Qty: 30 Cap, Refills: 0 STOP taking these medications  
  
 metOLazone (ZAROXOLYN) 2.5 mg tablet Comments:  
Reason for Stopping:   
   
 ciprofloxacin (CIPRO) 250 mg tablet Comments:  
Reason for Stopping:   
   
 latanoprost (XALATAN) 0.005 % ophthalmic solution Comments:  
Reason for Stopping: My Recommended Diet, Activity, Wound Care, and follow-up labs are listed in the patient's Discharge Insturctions which I have personally completed and reviewed. ______________________________________________________________________ Risk of deterioration: Moderate Condition at Discharge:  Stable 
______________________________________________________________________ Disposition Home with home health  
______________________________________________________________________ Care Plan discussed with:  
Patient, Family, RN, Care Manager, Consultant 
 
______________________________________________________________________ Code Status: Full Code 
______________________________________________________________________ Follow up with: PCP : Baylee Patel MD 
Follow-up Information Follow up With Specialties Details Why Contact Info Baylee Patel MD [facility] 1st floor [facility] [place] [id number] 
[phone] Total time in minutes spent coordinating this discharge (includes going over instructions, follow-up, prescriptions, and preparing report for sign off to her PCP) :  35 minutes Signed: 
Shara Abraham MD

## 2019-01-31 NOTE — PROGRESS NOTES
Subjective/HPI:     Ms. Cuca Kingston is a 80 y.o. female is here for routine f/u. She has a PMHx of chronic HFrEF s/p ICD, PAF, non-obstructive CAD, CKD, HTN and HLD. Since her last visit, she was admitted to the [facility] with acute kidney injury, with creatinine up to 2.58. Her diuretics and ACEi were held, with creatinine ultimately improving to 1.56 by discharge. She has since resumed her lisinopril and only started furosemide. Her weights have remained stable since discharge. She denies complaints of chest pains, dizziness, orthopnea, PND or edema. She has no shortness of breath or palpitations. She says she watches her salt intake and is careful not to take in excess fluid. She weighs herself every day.     Patient Active Problem List   Diagnosis Code    Sick sinus syndrome ([facility] I49.5    HTN (hypertension) I10    Hyperlipidemia E78.5    Chest pain R07.9    Chronic systolic heart failure ([facility] I50.22    Cardiac pacemaker in situ Z95.0    Cardiomyopathy in other diseases classified elsewhere I43    Chronic diastolic heart failure (HCC) I50.32    Atrial flutter (HCC) I48.92    Coronary atherosclerosis of native coronary artery I25.10    Rectal bleeding K62.5    Hypokalemia E87.6    Hypomagnesemia E83.42    Colitis, ischemic (HCC) K55.9    Ischemic colitis (HCC) K55.9    Asthma J45.909    Moderate to severe pulmonary hypertension (HCC) I27.20    Acute-on-chronic respiratory failure (HCC) J96.20    Hypothyroidism P10.3    Systolic and diastolic CHF, acute on chronic (HCC) I50.43    Pulmonary HTN (HCC) I27.20    S/P implantation of automatic cardioverter/defibrillator (AICD) Z95.810    Syncope R55    Diverticulosis K57.90    Coronary artery disease involving native coronary artery of native heart without angina pectoris I25.10    Mixed hyperlipidemia E78.2    Essential hypertension I10    Paroxysmal atrial fibrillation (HCC) I48.0    Severe obesity (HCC) E66.01    COTY (acute kidney injury) ([facility] N17.9       PCP Provider  Caitlyn Bolton MD  Past Medical History:   Diagnosis Date    Asthma     Autoimmune disease ([facility]     lupus    CAD (coronary artery disease)     cardiac cath    CAD (coronary artery disease)     sick sinus syndrome    Essential hypertension     GERD (gastroesophageal reflux disease)     Glaucoma     Heart failure ([facility]     HX OTHER MEDICAL     Lupus    Hypertension     Other ill-defined conditions(799.89)     Pacemaker     PUD (peptic ulcer disease)     S/P implantation of automatic cardioverter/defibrillator (AICD) 1/14/2015    [employer] upgrade to AICD implant    SSS (sick sinus syndrome) ([facility]       Past Surgical History:   Procedure Laterality Date    CARDIAC SURG PROCEDURE UNLIST      pacemaker/defibrilator.     HX GYN      BTL    HX PACEMAKER      OK COLONOSCOPY W/BIOPSY SINGLE/MULTIPLE  3/26/2012         OK COLSC FLX W/RMVL OF TUMOR POLYP LESION SNARE TQ  3/26/2012          Family History   Problem Relation Age of Onset    Arrhythmia Mother     Hypertension Mother     Hypertension Father      Social History     Socioeconomic History    Marital status: SINGLE     Spouse name: Not on file    Number of children: Not on file    Years of education: Not on file    Highest education level: Not on file   Social Needs    Financial resource strain: Not on file    Food insecurity - worry: Not on file    Food insecurity - inability: Not on file   [employer] needs - medical: Not on file   [employer] needs - non-medical: Not on file   Occupational History    Not on file   Tobacco Use    Smoking status: Never Smoker    Smokeless tobacco: Never Used   Substance and Sexual Activity    Alcohol use: No     Alcohol/week: 0.0 oz    Drug use: No    Sexual activity: Not on file   Other Topics Concern    Not on file   Social History Narrative    Not on file       Allergies   Allergen Reactions    Contrast Agent [Iodine] Anaphylaxis Made aware of allergy 1/4/13    Sulfa (Sulfonamide Antibiotics) Hives    Codeine Anxiety    Pcn [Penicillins] Anaphylaxis     Reaction was to penicillin injections into buttocks. She can take oral amoxicillin        Current Outpatient Medications   Medication Sig    aspirin delayed-release 81 mg tablet Take  by mouth daily.  ciprofloxacin HCl (CIPRO) 250 mg tablet Take  by mouth daily.  Oxygen     ferrous sulfate (IRON) 325 mg (65 mg iron) cpER Take  by mouth.  carvedilol (COREG) 6.25 mg tablet Take 1 Tab by mouth two (2) times daily (with meals).  furosemide (LASIX) 40 mg tablet Take 1 Tab by mouth two (2) times a day.  nitroglycerin (NITROSTAT) 0.4 mg SL tablet USE 1 TABLET UNDER THE TONGUE EVERY 5 MINUTES AS NEEDED FOR CHEST PAIN CALL 911 IF NOT RELIEVED BY 3 TABLETS    ARNUITY ELLIPTA 100 mcg/actuation dsdv inhaler INHALE 1 PUFF PO Q 24 H    potassium chloride SR (KLOR-CON 10) 10 mEq tablet Take 10 mEq by mouth daily. Patient takes 10 mEq daily and 20 mEq QHS    albuterol (PROVENTIL HFA, VENTOLIN HFA, PROAIR HFA) 90 mcg/actuation inhaler Take 1-2 Puffs by inhalation every four (4) hours as needed for Wheezing or Shortness of Breath.  Psyllium Husk-Sucrose (FIBER, PSYLLIUM HUSK/SUGAR,) 3.4 gram/11 gram powd Take 1 Cap by mouth daily.  mv-mn/folic acid/calcium/vit K (ONE-A-DAY WOMEN'S 50 PLUS PO) Take 1 Tab by mouth daily.  atorvastatin (LIPITOR) 40 mg tablet TAKE 1 TABLET BY MOUTH DAILY    lisinopril (PRINIVIL, ZESTRIL) 10 mg tablet TAKE 1 TABLET BY MOUTH DAILY    NEXIUM 40 mg capsule TAKE ONE CAPSULE BY MOUTH DAILY    budesonide (PULMICORT) 180 mcg/actuation aepb inhaler Take 2 puffs by inhalation two (2) times a day.  acetaminophen (TYLENOL ARTHRITIS PAIN) 650 mg CR tablet Take 650 mg by mouth every six (6) hours as needed for Pain.  cetirizine (ZYRTEC) 10 mg tablet Take 10 mg by mouth daily. No current facility-administered medications for this visit. Facility-Administered Medications Ordered in Other Visits   Medication Dose Route Frequency    ADDaptor        vancomycin (VANCOCIN) 1,000 mg injection        0.9% sodium chloride (MBP/ADV) 0.9 % infusion        bacitracin 50,000 unit injection            I have reviewed the problem list, allergy list, medical history, family, social history and medications. Review of Symptoms:    Review of Systems   Constitutional: Negative for chills, fever and weight loss. HENT: Negative for nosebleeds. Eyes: Negative for blurred vision and double vision. Respiratory: Negative for cough, shortness of breath and wheezing. Cardiovascular: Negative for chest pain, palpitations, orthopnea, leg swelling and PND. Gastrointestinal: Negative for abdominal pain, blood in stool, diarrhea, nausea and vomiting. Musculoskeletal: Negative for joint pain. Skin: Negative for rash. Neurological: Negative for dizziness, tingling and loss of consciousness. Endo/Heme/Allergies: Does not bruise/bleed easily. Physical Exam:      General: Well developed, in no acute distress, cooperative and alert  HEENT: No carotid bruits, no JVD, trach is midline. Neck Supple; there is an enlarged goiter, PEERL, EOM intact. Heart:  reg rate and rhythm; normal S1/S2; no murmurs, gallops or rubs.   Respiratory: Clear bilaterally x 4, no wheezing or rales. On supplemental O2 via 3L NC  Abdomen:   Soft, non-tender, no distention, no masses. + BS.   Extremities:  Normal cap refill, no cyanosis, atraumatic. No edema. Neuro: A&Ox3, speech clear, wheelchair for ambulation  Skin: Skin color is normal. No rashes or lesions.  Non diaphoretic  Vascular: 2+ pulses symmetric in all extremities    Vitals:    01/31/19 1106   BP: 124/64   Pulse: 78   Resp: 16   SpO2: 99%   Weight: 198 lb (89.8 kg)   Height: 5' 5\" (1.651 m)       Cardiographics    ECG: sinus rhythm  Results for orders placed or performed during the hospital encounter of 01/16/19 EKG, 12 LEAD, INITIAL   Result Value Ref Range    Ventricular Rate 65 BPM    Atrial Rate 65 BPM    P-R Interval 198 ms    QRS Duration 138 ms    Q-T Interval 476 ms    QTC Calculation (Bezet) 495 ms    Calculated P Axis 44 degrees    Calculated R Axis -25 degrees    Calculated T Axis 15 degrees    Diagnosis       Normal sinus rhythm  Left ventricular hypertrophy with QRS widening  When compared with ECG of 07-DEC-2018 12:35,  premature ventricular complexes are no longer present  Confirmed by Bill Waldrop ([id number]) on 1/16/2019 4:48:36 PM         Cardiology Labs:  Lab Results   Component Value Date/Time    Cholesterol, total 130 02/06/2018 09:51 AM    HDL Cholesterol 37 (L) 02/06/2018 09:51 AM    LDL, calculated 76 02/06/2018 09:51 AM    Triglyceride 84 02/06/2018 09:51 AM       Lab Results   Component Value Date/Time    Sodium 135 (L) 01/18/2019 03:00 AM    Potassium 3.2 (L) 01/18/2019 03:00 AM    Chloride 94 (L) 01/18/2019 03:00 AM    CO2 34 (H) 01/18/2019 03:00 AM    Anion gap 7 01/18/2019 03:00 AM    Glucose 92 01/18/2019 03:00 AM    BUN 63 (H) 01/18/2019 03:00 AM    Creatinine 1.56 (H) 01/18/2019 03:00 AM    BUN/Creatinine ratio 40 (H) 01/18/2019 03:00 AM    GFR est AA 38 (L) 01/18/2019 03:00 AM    GFR est non-AA 32 (L) 01/18/2019 03:00 AM    Calcium 8.5 01/18/2019 03:00 AM    Bilirubin, total 1.0 01/18/2019 03:00 AM    AST (SGOT) 22 01/18/2019 03:00 AM    Alk. phosphatase 63 01/18/2019 03:00 AM    Protein, total 7.7 01/18/2019 03:00 AM    Albumin 3.3 (L) 01/18/2019 03:00 AM    Globulin 4.4 (H) 01/18/2019 03:00 AM    A-G Ratio 0.8 (L) 01/18/2019 03:00 AM    ALT (SGPT) 16 01/18/2019 03:00 AM           Assessment:     Assessment:       ICD-10-CM ICD-9-CM    1. Chronic systolic heart failure (HCC) C09.73 355.48 METABOLIC PANEL, BASIC      MAGNESIUM      BNP   2. Paroxysmal atrial fibrillation (HCC) W86.2 285.21 METABOLIC PANEL, BASIC      MAGNESIUM      BNP   3.  Essential hypertension F87 672.4 METABOLIC PANEL, BASIC      MAGNESIUM      BNP   4. Mixed hyperlipidemia F19.8 259.5 METABOLIC PANEL, BASIC      MAGNESIUM      BNP   5. Sick sinus syndrome (HCC) V32.3 912.26 METABOLIC PANEL, BASIC      MAGNESIUM      BNP   6. S/P implantation of automatic cardioverter/defibrillator (AICD) H44.480 A54.62 METABOLIC PANEL, BASIC      MAGNESIUM      BNP   7. Coronary artery disease involving native coronary artery of native heart without angina pectoris W55.26 426.24 METABOLIC PANEL, BASIC      MAGNESIUM      BNP     Orders Placed This Encounter    METABOLIC PANEL, BASIC     Standing Status:   Future     Standing Expiration Date:   3/11/2019    MAGNESIUM     Standing Status:   Future     Standing Expiration Date:   3/11/2019    BNP     Standing Status:   Future     Standing Expiration Date:   3/11/2019      Plan:     1. Chronic systolic heart failure (HCC)  Non-ischemic cardiomyopathy. Echo in 12/2018 with LVEF 40-45% with midly dilated left arium, mod-sev MR and mod-sev pulmonary HTN. S/p ICD. Euvolemic on exam today, weights stable at 198 lbs. Continue present medical management with BB, ACEi, diuretics and ASA. Continue daily weights, along with sodium and fluid restriction. She will call for weights > 3 lbs/day or > 6 lbs/week. Check electrolytes and BNP in 2 weeks. Follow-up in 1 month. 2. Paroxysmal atrial fibrillation (HCC)  Recent device check in 1/2019 showed increased AF burden of 6%, with the longest episode being almost 2 hours long. All AF events occurred during her previous hospitalization in 12/2018, when she was admitted for acute on chronic CHF exacerbation. Maintained only on ASA for anti-coagulation at this time. Her ICD check 1/31/2019 was noted to have an increase to 6% of atrial high rates. Upon further review of the rhythm, it appear that she was pacing with [employer] sensing in the atrium. No need for ablation/OAC with this.      3. Essential hypertension  BP controlled; continue present anti-hypertensives and low sodium diet. 4. Mixed hyperlipidemia  Continue statin therapy and low fat, low cholesterol diet. Lipid surveillance at least annually with Dr. Ban Flynn. 5. Sick sinus syndrome ([address]  S/p ICD. 6. S/P implantation of automatic cardioverter/defibrillator (AICD)  With dual chamber [employer] ICD. Continue with routine interrogations with Dr. Dwight Daniel.    7.  CAD  Without anginal or anginal equivalent symptoms. Cardiac Cath in 2014 with 60% mid LAD and RPDA stenosis. Continue medical management and risk factor modification. Follow up in 1 month, sooner PRN.      Apolonia Ascencio, FNP-BC  Aimee Perla MD

## 2019-01-31 NOTE — PROGRESS NOTES
1. Have you been to the ER, urgent care clinic since your last visit? Hospitalized since your last visit? 1-16-18 ED [facility] admitted, kidney disease. 2. Have you seen or consulted any other health care providers outside of the [address] [place] [address] since your last visit? Include any pap smears or colon screening. No 
 
Chief Complaint Patient presents with  Irregular Heart Beat HF appt. C/O SOB.

## 2019-01-31 NOTE — PROGRESS NOTES
Subjective:  
  
Reese Díaz is a 80 y.o. female is here for EP follow up from hospitalization and review of last remote monitoring. The patient denies chest pain/ shortness of breath, orthopnea, PND, palpitations, syncope, presyncope or fatigue. Family reports weighting pt daily and that weight has been stable. Aware of some returning of recent lower extremity edema in ankles. Is off metolazone. Patient Active Problem List  
 Diagnosis Date Noted  COTY (acute kidney injury) ([address] 01/16/2019  Atherosclerosis of native coronary artery of native heart without angina pectoris 12/19/2018  Mixed hyperlipidemia 12/19/2018  Essential hypertension 12/19/2018  Paroxysmal atrial fibrillation ([address] 12/19/2018  Severe obesity ([address] 12/19/2018  Diverticulosis 10/02/2018  Syncope 05/26/2016  S/P implantation of automatic cardioverter/defibrillator (AICD) 01/14/2015  Pulmonary HTN ([address] 12/04/2014  Systolic and diastolic CHF, acute on chronic ([address] 09/03/2014  Acute-on-chronic respiratory failure ([address] 08/27/2014  Hypothyroidism 08/27/2014  Asthma 06/12/2013  Moderate to severe pulmonary hypertension ([address] 06/12/2013  Ischemic colitis ([address] 05/14/2012  Colitis, ischemic ([address] 03/26/2012  Hypokalemia 03/23/2012  Hypomagnesemia 03/23/2012  Rectal bleeding 03/22/2012  Chronic systolic heart failure ([address] 03/08/2012  Cardiac pacemaker in situ 03/08/2012  Cardiomyopathy in other diseases classified elsewhere 03/08/2012  Chronic diastolic heart failure ([address] 03/08/2012  Atrial flutter ([address] 03/08/2012  Coronary atherosclerosis of native coronary artery 03/08/2012  Chest pain 02/25/2012  Sick sinus syndrome ([address] 02/24/2012  
 HTN (hypertension) 02/24/2012  Hyperlipidemia 02/24/2012 Memo Beltrán MD 
Past Medical History:  
Diagnosis Date  Asthma  Autoimmune disease ([address] lupus  CAD (coronary artery disease)   
 cardiac cath  CAD (coronary artery disease) sick sinus syndrome  Essential hypertension  GERD (gastroesophageal reflux disease)  Glaucoma  Heart failure ([facility] [address] Lupus  Hypertension  Other ill-defined conditions(799.89)  Pacemaker  PUD (peptic ulcer disease)  S/P implantation of automatic cardioverter/defibrillator (AICD) 1/14/2015 [id number] upgrade to AICD implant  SSS (sick sinus syndrome) ([facility] Past Surgical History:  
Procedure Laterality Date  CARDIAC SURG PROCEDURE UNLIST    
 pacemaker/defibrilator.  HX GYN    
 BTL  
 HX PACEMAKER    
 ND COLONOSCOPY W/BIOPSY SINGLE/MULTIPLE  3/26/2012  ND COLSC FLX W/RMVL OF TUMOR POLYP LESION SNARE TQ  3/26/2012 Allergies Allergen Reactions  Contrast Agent [Iodine] Anaphylaxis Made aware of allergy 1/4/13  Sulfa (Sulfonamide Antibiotics) Hives  Codeine Anxiety  Pcn [Penicillins] Anaphylaxis Reaction was to penicillin injections into buttocks. She can take oral amoxicillin Family History Problem Relation Age of Onset  Arrhythmia Mother  Hypertension Mother  Hypertension Father   
 negative for cardiac disease Social History Socioeconomic History  Marital status: SINGLE Spouse name: Not on file  Number of children: Not on file  Years of education: Not on file  Highest education level: Not on file Tobacco Use  Smoking status: Never Smoker  Smokeless tobacco: Never Used Substance and Sexual Activity  Alcohol use: No  
  Alcohol/week: 0.0 oz  Drug use: No  
 
Current Outpatient Medications Medication Sig  
 aspirin delayed-release 81 mg tablet Take  by mouth daily.  ciprofloxacin HCl (CIPRO) 250 mg tablet Take  by mouth daily.  Oxygen  ferrous sulfate (IRON) 325 mg (65 mg iron) cpER Take  by mouth.  carvedilol (COREG) 6.25 mg tablet Take 1 Tab by mouth two (2) times daily (with meals).  furosemide (LASIX) 40 mg tablet Take 1 Tab by mouth two (2) times a day.  nitroglycerin (NITROSTAT) 0.4 mg SL tablet USE 1 TABLET UNDER THE TONGUE EVERY 5 MINUTES AS NEEDED FOR CHEST PAIN CALL 911 IF NOT RELIEVED BY 3 TABLETS  ARNUITY ELLIPTA 100 mcg/actuation dsdv inhaler INHALE 1 PUFF PO Q 24 H  
 potassium chloride SR (KLOR-CON 10) 10 mEq tablet Take 10 mEq by mouth daily. Patient takes 10 mEq daily and 20 mEq QHS  albuterol (PROVENTIL HFA, VENTOLIN HFA, PROAIR HFA) 90 mcg/actuation inhaler Take 1-2 Puffs by inhalation every four (4) hours as needed for Wheezing or Shortness of Breath.  Psyllium Husk-Sucrose (FIBER, PSYLLIUM HUSK/SUGAR,) 3.4 gram/11 gram powd Take 1 Cap by mouth daily.  mv-mn/folic acid/calcium/vit K (ONE-A-DAY WOMEN'S 50 PLUS PO) Take 1 Tab by mouth daily.  atorvastatin (LIPITOR) 40 mg tablet TAKE 1 TABLET BY MOUTH DAILY  lisinopril (PRINIVIL, ZESTRIL) 10 mg tablet TAKE 1 TABLET BY MOUTH DAILY  NEXIUM 40 mg capsule TAKE ONE CAPSULE BY MOUTH DAILY  acetaminophen (TYLENOL ARTHRITIS PAIN) 650 mg CR tablet Take 650 mg by mouth every six (6) hours as needed for Pain.  cetirizine (ZYRTEC) 10 mg tablet Take 10 mg by mouth daily.  budesonide (PULMICORT) 180 mcg/actuation aepb inhaler Take 2 puffs by inhalation two (2) times a day. No current facility-administered medications for this visit. Facility-Administered Medications Ordered in Other Visits Medication Dose Route Frequency  ADDaptor  vancomycin (VANCOCIN) 1,000 mg injection  0.9% sodium chloride (MBP/ADV) 0.9 % infusion  bacitracin 50,000 unit injection Vitals:  
 01/31/19 1017 BP: 124/64 Pulse: 78 Resp: 18 SpO2: 98% Weight: 198 lb (89.8 kg) Height: 5' 5\" (1.651 m) I have reviewed the nurses notes, vitals, problem list, allergy list, medical history, family, social history and medications. Review of Symptoms: 
 
General: Pt denies excessive weight gain or loss. Pt is able to conduct ADL's HEENT: Denies blurred vision, headaches, epistaxis and difficulty swallowing. Respiratory: Denies shortness of breath, VILLAR, wheezing or stridor. Cardiovascular: Denies precordial pain, palpitations, reports edema Gastrointestinal: Denies poor appetite, indigestion, abdominal pain or blood in stool Urinary: Denies dysuria, pyuria Musculoskeletal: Denies pain or swelling from muscles or joints Neurologic: Denies tremor, paresthesias, or sensory motor disturbance Skin: Denies rash, itching or texture change. Psych: Denies depression Physical Exam:   
 
General: Well developed, in no acute distress. HEENT: Eyes - PERRL, no jvd Heart:  Normal S1/S2 negative S3 or S4. Regular, no murmur, gallop or rub.  
Respiratory: Clear bilaterally x 4, no wheezing Present - crackles left lower lobe posteriorly Extremities:  Tr edema, normal cap refill, no cyanosis. Musculoskeletal: No clubbing Neuro: A&Ox3, speech clear, gait stable. Skin: Skin color is normal. No rashes or lesions. Non diaphoretic Vascular: 2+ pulses symmetric in all extremities Echo 12/7/18: 
Ejection fraction was estimated in the range of 40 % to 45 
%. There was mild diffuse hypokinesis. Cardiographics Ekg: Sinus  Rhythm Voltage criteria for LVH  (R(aVL) exceeds 1.26 mV). -  Nonspecific T-abnormality. Results for orders placed or performed during the hospital encounter of 01/16/19 EKG, 12 LEAD, INITIAL Result Value Ref Range Ventricular Rate 65 BPM  
 Atrial Rate 65 BPM  
 P-R Interval 198 ms QRS Duration 138 ms Q-T Interval 476 ms QTC Calculation (Bezet) 495 ms Calculated P Axis 44 degrees Calculated R Axis -25 degrees Calculated T Axis 15 degrees Diagnosis Normal sinus rhythm Left ventricular hypertrophy with QRS widening When compared with ECG of 07-DEC-2018 12:35, 
premature ventricular complexes are no longer present Confirmed by Mayela Epps ([id number]) on 1/16/2019 4:48:36 PM 
  
 
 
 
Lab Results Component Value Date/Time WBC 7.4 01/18/2019 03:00 AM  
 HGB 8.3 (L) 01/18/2019 03:00 AM  
 HCT 27.0 (L) 01/18/2019 03:00 AM  
 PLATELET 570 43/22/1722 03:00 AM  
 MCV 87.7 01/18/2019 03:00 AM  
  
Lab Results Component Value Date/Time Sodium 135 (L) 01/18/2019 03:00 AM  
 Potassium 3.2 (L) 01/18/2019 03:00 AM  
 Chloride 94 (L) 01/18/2019 03:00 AM  
 CO2 34 (H) 01/18/2019 03:00 AM  
 Anion gap 7 01/18/2019 03:00 AM  
 Glucose 92 01/18/2019 03:00 AM  
 BUN 63 (H) 01/18/2019 03:00 AM  
 Creatinine 1.56 (H) 01/18/2019 03:00 AM  
 BUN/Creatinine ratio 40 (H) 01/18/2019 03:00 AM  
 GFR est AA 38 (L) 01/18/2019 03:00 AM  
 GFR est non-AA 32 (L) 01/18/2019 03:00 AM  
 Calcium 8.5 01/18/2019 03:00 AM  
 Bilirubin, total 1.0 01/18/2019 03:00 AM  
 AST (SGOT) 22 01/18/2019 03:00 AM  
 Alk. phosphatase 63 01/18/2019 03:00 AM  
 Protein, total 7.7 01/18/2019 03:00 AM  
 Albumin 3.3 (L) 01/18/2019 03:00 AM  
 Globulin 4.4 (H) 01/18/2019 03:00 AM  
 A-G Ratio 0.8 (L) 01/18/2019 03:00 AM  
 ALT (SGPT) 16 01/18/2019 03:00 AM  
  
Lab Results Component Value Date/Time TSH 2.26 08/26/2014 06:50 PM  
 
 
 Assessment: ICD-10-CM ICD-9-CM 1. Irregular heartbeat I49.9 427.9 AMB POC EKG ROUTINE W/ 12 LEADS, INTER & REP 2. Paroxysmal atrial fibrillation (HCC) I48.0 427.31   
3. Sick sinus syndrome (HCC) I49.5 427.81   
4. Cardiomyopathy, unspecified type ([facility] I42.9 425.4 5. S/P implantation of automatic cardioverter/defibrillator (AICD) Z95.810 V45.02   
6. Essential hypertension I10 401.9 Orders Placed This Encounter  AMB POC EKG ROUTINE W/ 12 LEADS, INTER & REP Order Specific Question:   Reason for Exam: Answer:   ROUTINE  aspirin delayed-release 81 mg tablet Sig: Take  by mouth daily.  ciprofloxacin HCl (CIPRO) 250 mg tablet Sig: Take  by mouth daily.  Oxygen  ferrous sulfate (IRON) 325 mg (65 mg iron) cpER Sig: Take  by mouth. Plan: Ms Trace Albarado is here for hospital follow up from dehydration and discussion of remote monitoring of ICD. Her ICD check was noted to have an increase to 6% of atrial high rates. Upon further review of the rhythm, it appear that she was pacing with [employer] sensing in the atrium. No need for ablation/OAC with this. Have discussed daily weights with patient and family. Will continue. We will see her back in 1 year. Continue medical management for HTN, CHF, hyperlipidemia Thank you for allowing me to participate in Khoa Steele 's care. Pasquale Colbert NP Patient seen and examined. All pertinent data reviewed. I have reviewed detailed note as outlined by Pasquale Colbert NP. Case discussed with Nursing/medical assistant staff and Pasquale Colbert NP. Plans as outlined. ICD check wnl - farfield sensing on A lead - while in the hospital. Cont med rx for cardiomyopathy, htn and hyperlipidemia. F/u in one year.  
 
Earline Pérez MD, Nataly Hernandes

## 2019-01-31 NOTE — PROGRESS NOTES
1. Have you been to the ER, urgent care clinic since your last visit? Hospitalized since your last visit? Yes When: 1/2018 [facility] Dehydration    2. Have you seen or consulted any other health care providers outside of the [address] [place] [address] since your last visit? Include any pap smears or colon screening. Yes When: yes PCP     Patient C/O fatigue & SOB  had recent medication changes.

## 2019-02-07 NOTE — TELEPHONE ENCOUNTER
Daughter Daniella Ringling calling requesting advise on fluid build up ankle swelling please advise thanks

## 2019-02-27 NOTE — TELEPHONE ENCOUNTER
----- Message from Lucinda Ramirez MD sent at 2/26/2019  7:10 PM EST -----  Kidney function and electrolytes look good, blood test shows there is no evidence of significant extra fluid on her body. Continue current care, follow-up as scheduled.

## 2019-02-27 NOTE — PROGRESS NOTES
Kidney function and electrolytes look good, blood test shows there is no evidence of significant extra fluid on her body. Continue current care, follow-up as scheduled.

## 2019-04-05 NOTE — LETTER
4/11/19 Patient: Maria Isabel Claros YOB: 1934 Date of Visit: 4/5/2019 Kalani Olmedo MD 
[address] Floor [place] [id number] VIA Facsimile: [phone] Dear Kalani Olmedo MD, Thank you for referring Ms. Gurmeet Stanley to [address] [place], [place] [address]keena for evaluation. My notes for this consultation are attached. If you have questions, please do not hesitate to call me. I look forward to following your patient along with you.  
 
 
Sincerely, 
 
Terrell Foster MD

## 2019-04-05 NOTE — PROGRESS NOTES
Roberto Meade DNP, ANP-BC  Subjective/HPI:     Pradeep Best is a 80 y.o. female is here for one-month follow-up regarding history of diastolic and systolic heart failure. Patient reports she has been feeling well without dyspnea on exertion orthopnea or paroxysmal nocturnal dyspnea. Her weight is down 3 pounds from last visit. Reviewed labs from last visit, creatinine is stable. PCP Provider  Chava Lucia MD  Past Medical History:   Diagnosis Date    Asthma     Autoimmune disease ([facility] [address]     lupus    CAD (coronary artery disease)     cardiac cath    CAD (coronary artery disease)     sick sinus syndrome    Essential hypertension     GERD (gastroesophageal reflux disease)     Glaucoma     Heart failure (HCC)     HX OTHER MEDICAL     Lupus    Hypertension     Other ill-defined conditions(799.89)     Pacemaker     PUD (peptic ulcer disease)     S/P implantation of automatic cardioverter/defibrillator (AICD) 1/14/2015    Portfolium upgrade to AICD implant    SSS (sick sinus syndrome) ([facility] [address]       Past Surgical History:   Procedure Laterality Date    CARDIAC SURG PROCEDURE UNLIST      pacemaker/defibrilator.  HX GYN      BTL    HX PACEMAKER      MO COLONOSCOPY W/BIOPSY SINGLE/MULTIPLE  3/26/2012         MO COLSC FLX W/RMVL OF TUMOR POLYP LESION SNARE TQ  3/26/2012          Allergies   Allergen Reactions    Contrast Agent [Iodine] Anaphylaxis     Made aware of allergy 1/4/13    Sulfa (Sulfonamide Antibiotics) Hives    Codeine Anxiety    Pcn [Penicillins] Anaphylaxis     Reaction was to penicillin injections into buttocks. She can take oral amoxicillin      Family History   Problem Relation Age of Onset    Arrhythmia Mother     Hypertension Mother     Hypertension Father       Current Outpatient Medications   Medication Sig    metOLazone (ZAROXOLYN) 2.5 mg tablet Take 1 Tab by mouth every seven (7) days.  As needed for weight gain of greater than 4 pounds    aspirin delayed-release 81 mg tablet Take  by mouth daily.  ciprofloxacin HCl (CIPRO) 250 mg tablet Take  by mouth daily.  Oxygen     ferrous sulfate (IRON) 325 mg (65 mg iron) cpER Take  by mouth.  carvedilol (COREG) 6.25 mg tablet Take 1 Tab by mouth two (2) times daily (with meals).  furosemide (LASIX) 40 mg tablet Take 1 Tab by mouth two (2) times a day.  ARNUITY ELLIPTA 100 mcg/actuation dsdv inhaler INHALE 1 PUFF PO Q 24 H    potassium chloride SR (KLOR-CON 10) 10 mEq tablet Take 10 mEq by mouth daily. Patient takes 10 mEq daily and 20 mEq QHS    albuterol (PROVENTIL HFA, VENTOLIN HFA, PROAIR HFA) 90 mcg/actuation inhaler Take 1-2 Puffs by inhalation every four (4) hours as needed for Wheezing or Shortness of Breath.  Psyllium Husk-Sucrose (FIBER, PSYLLIUM HUSK/SUGAR,) 3.4 gram/11 gram powd Take 1 Cap by mouth daily.  mv-mn/folic acid/calcium/vit K (ONE-A-DAY WOMEN'S 50 PLUS PO) Take 1 Tab by mouth daily.  atorvastatin (LIPITOR) 40 mg tablet TAKE 1 TABLET BY MOUTH DAILY    lisinopril (PRINIVIL, ZESTRIL) 10 mg tablet TAKE 1 TABLET BY MOUTH DAILY    NEXIUM 40 mg capsule TAKE ONE CAPSULE BY MOUTH DAILY    budesonide (PULMICORT) 180 mcg/actuation aepb inhaler Take 2 puffs by inhalation two (2) times a day.  acetaminophen (TYLENOL ARTHRITIS PAIN) 650 mg CR tablet Take 650 mg by mouth every six (6) hours as needed for Pain.  cetirizine (ZYRTEC) 10 mg tablet Take 10 mg by mouth daily.  nitroglycerin (NITROSTAT) 0.4 mg SL tablet TAKE 1 TABLET UNDER THE TONGUE EVERY 5 MINTUES AS NEEDED FOR CHEST PAIN CALL 911 IF NOT RELIEVED BY 3 TABLETS     No current facility-administered medications for this visit.       Facility-Administered Medications Ordered in Other Visits   Medication Dose Route Frequency    ADDaptor        vancomycin (VANCOCIN) 1,000 mg injection        0.9% sodium chloride (MBP/ADV) 0.9 % infusion        bacitracin 50,000 unit injection          Vitals:    04/05/19 1528 04/05/19 1531   BP: 120/70 125/65   Pulse: 86    Resp: 18    SpO2: 99%    Weight: 195 lb 11.2 oz (88.8 kg)    Height: 5' 5\" (1.651 m)      Social History     Socioeconomic History    Marital status: SINGLE     Spouse name: Not on file    Number of children: Not on file    Years of education: Not on file    Highest education level: Not on file   Occupational History    Not on file   Social Needs    Financial resource strain: Not on file    Food insecurity:     Worry: Not on file     Inability: Not on file    Transportation needs:     Medical: Not on file     Non-medical: Not on file   Tobacco Use    Smoking status: Never Smoker    Smokeless tobacco: Never Used   Substance and Sexual Activity    Alcohol use: No     Alcohol/week: 0.0 oz    Drug use: No    Sexual activity: Not on file   Lifestyle    Physical activity:     Days per week: Not on file     Minutes per session: Not on file    Stress: Not on file   Relationships    Social connections:     Talks on phone: Not on file     Gets together: Not on file     Attends Judaism service: Not on file     Active member of club or organization: Not on file     Attends meetings of clubs or organizations: Not on file     Relationship status: Not on file    Intimate partner violence:     Fear of current or ex partner: Not on file     Emotionally abused: Not on file     Physically abused: Not on file     Forced sexual activity: Not on file   Other Topics Concern    Not on file   Social History Narrative    Not on file       I have reviewed the nurses notes, vitals, problem list, allergy list, medical history, family, social history and medications. Review of Symptoms:    General: Pt denies excessive weight gain or loss. Pt is able to conduct ADL's  HEENT: Denies blurred vision, headaches, epistaxis and difficulty swallowing. Respiratory: Denies shortness of breath, VILLAR, wheezing or stridor.   Cardiovascular: Denies precordial pain, palpitations, edema or PND  Gastrointestinal: Denies poor appetite, indigestion, abdominal pain or blood in stool  Musculoskeletal: Denies pain or swelling from muscles or joints  Neurologic: Denies tremor, paresthesias, or sensory motor disturbance  Skin: Denies rash, itching or texture change. Physical Exam:      General: Well developed, in no acute distress, cooperative and alert  HEENT: No carotid bruits, no JVD, trach is midline. Neck Supple, PEERL,  Heart:  Normal S1/S2 negative S3 or S4. Regular, no murmur, gallop or rub.   Respiratory: Clear bilaterally x 4, no wheezing or rales  Abdomen:   Soft, non-tender, no masses, bowel sounds are active.   Extremities: Bilateral ankle edema, normal cap refill, no cyanosis, atraumatic. Neuro: A&Ox3, speech clear, gait is assessed as she is in a wheelchair. Skin: Skin color is normal. No rashes or lesions.  Non diaphoretic  Vascular: 2+ pulses symmetric in all extremities    Cardiographics    ECG: Sinus rhythm from January 2019  Results for orders placed or performed during the hospital encounter of 01/16/19   EKG, 12 LEAD, INITIAL   Result Value Ref Range    Ventricular Rate 65 BPM    Atrial Rate 65 BPM    P-R Interval 198 ms    QRS Duration 138 ms    Q-T Interval 476 ms    QTC Calculation (Bezet) 495 ms    Calculated P Axis 44 degrees    Calculated R Axis -25 degrees    Calculated T Axis 15 degrees    Diagnosis       Normal sinus rhythm  Left ventricular hypertrophy with QRS widening  When compared with ECG of 07-DEC-2018 12:35,  premature ventricular complexes are no longer present  Confirmed by Gerald Powell ([id number]) on 1/16/2019 4:48:36 PM           Cardiology Labs:  Lab Results   Component Value Date/Time    Cholesterol, total 130 02/06/2018 09:51 AM    HDL Cholesterol 37 (L) 02/06/2018 09:51 AM    LDL, calculated 76 02/06/2018 09:51 AM    Triglyceride 84 02/06/2018 09:51 AM       Lab Results   Component Value Date/Time    Sodium 140 02/18/2019 12:30 PM    Potassium 3.8 02/18/2019 12:30 PM    Chloride 97 02/18/2019 12:30 PM    CO2 30 (H) 02/18/2019 12:30 PM    Anion gap 7 01/18/2019 03:00 AM    Glucose 101 (H) 02/18/2019 12:30 PM    BUN 11 02/18/2019 12:30 PM    Creatinine 1.11 (H) 02/18/2019 12:30 PM    BUN/Creatinine ratio 10 (L) 02/18/2019 12:30 PM    GFR est AA 53 (L) 02/18/2019 12:30 PM    GFR est non-AA 46 (L) 02/18/2019 12:30 PM    Calcium 9.0 02/18/2019 12:30 PM    Bilirubin, total 1.0 01/18/2019 03:00 AM    AST (SGOT) 22 01/18/2019 03:00 AM    Alk. phosphatase 63 01/18/2019 03:00 AM    Protein, total 7.7 01/18/2019 03:00 AM    Albumin 3.3 (L) 01/18/2019 03:00 AM    Globulin 4.4 (H) 01/18/2019 03:00 AM    A-G Ratio 0.8 (L) 01/18/2019 03:00 AM    ALT (SGPT) 16 01/18/2019 03:00 AM           Assessment:     Assessment:     Diagnoses and all orders for this visit:    1. Chronic diastolic heart failure (HCC)  -     METABOLIC PANEL, BASIC  -     MAGNESIUM    2. Chronic systolic heart failure (HCC)  -     METABOLIC PANEL, BASIC  -     MAGNESIUM    3. Sick sinus syndrome (HCC)  -     METABOLIC PANEL, BASIC  -     MAGNESIUM    Other orders  -     metOLazone (ZAROXOLYN) 2.5 mg tablet; Take 1 Tab by mouth every seven (7) days. As needed for weight gain of greater than 4 pounds        ICD-10-CM ICD-9-CM    1. Chronic diastolic heart failure (HCC) C09.80 527.63 METABOLIC PANEL, BASIC      MAGNESIUM   2. Chronic systolic heart failure (HCC) G76.84 307.92 METABOLIC PANEL, BASIC      MAGNESIUM   3. Sick sinus syndrome (HCC) W60.3 918.84 METABOLIC PANEL, BASIC      MAGNESIUM     Orders Placed This Encounter    METABOLIC PANEL, BASIC    MAGNESIUM    metOLazone (ZAROXOLYN) 2.5 mg tablet     Sig: Take 1 Tab by mouth every seven (7) days. As needed for weight gain of greater than 4 pounds     Dispense:  30 Tab     Refill:  0        Plan:     1. Chronic systolic heart failure (HCC)  Non-ischemic cardiomyopathy.   Echo in 12/2018 with LVEF 40-45% with midly dilated left arium, mod-sev MR and mod-sev pulmonary HTN. S/p ICD. Euvolemic on exam today, weights stable at 195 lbs, down 3 pounds from last visit in January when creatinine stable. .  Continue present medical management with BB, ACEi, diuretics and ASA. Continue daily weights, along with sodium and fluid restriction. She will call for weights > 3 lbs/day or > 6 lbs/week. Has ICD     2. Paroxysmal atrial fibrillation (HCC)  Recent device check in 1/2019 showed increased AF burden of 6%, with the longest episode being almost 2 hours long. All AF events occurred during her previous hospitalization in 12/2018, when she was admitted for acute on chronic CHF exacerbation. Maintained only on ASA for anti-coagulation at this time as per Dr. Monique Guerrier. Her ICD check 1/31/2019 was noted to have an increase to 6% of atrial high rates.     3. Essential hypertension  BP controlled; continue present anti-hypertensives and low sodium diet.     4. Mixed hyperlipidemia  Continue statin therapy and low fat, low cholesterol diet. Lipid surveillance at least annually with Dr. Rhonda Bergeron.     5. CAD  Without anginal or anginal equivalent symptoms. Cardiac Cath in 2014 with 60% mid LAD and RPDA stenosis. Continue medical management and risk factor modification.     Stable from cardiac perspective. Continue current medications follow-up in 3 months. Erin Grover MD    This note was created using voice recognition software. Despite editing, there may be syntax errors.

## 2019-04-05 NOTE — PROGRESS NOTES
1. Have you been to the ER, urgent care clinic since your last visit? Hospitalized since your last visit? No    2. Have you seen or consulted any other health care providers outside of the [address] [place] [address] since your last visit? Include any pap smears or colon screening.  Yes PCP Dr. Santana Holbrook routine visit    Chief Complaint   Patient presents with    Follow-up     1 month CHF       Pt has no new cardiac complaints

## 2019-06-19 NOTE — TELEPHONE ENCOUNTER
----- Message from Curtis Ring NP sent at 6/19/2019  1:54 PM EDT -----  Radha Howard,    Please call the patient and inform that labwork looks good. Potassium is just slightly low, I would have her take an extra tablet of Potassium on days she has to take Metolazone. Otherwise no changes.     Thanks,  [employer]

## 2019-06-19 NOTE — TELEPHONE ENCOUNTER
Denys Faust (daughter) informed on hippa Verified patient with two identifiers. Taking Zaroxolyn once weekly.

## 2019-06-19 NOTE — PROGRESS NOTES
Zoila,    Please call the patient and inform that labwork looks good. Potassium is just slightly low, I would have her take an extra tablet of Potassium on days she has to take Metolazone. Otherwise no changes.     Thanks,  [employer]

## 2019-09-27 NOTE — TELEPHONE ENCOUNTER
Spoke with patient's daughter. She will  the Eliquis and make appointment with Dr. Geeta Woo for follow up.

## 2019-10-08 NOTE — ED PROVIDER NOTES
EMERGENCY DEPARTMENT HISTORY AND PHYSICAL EXAM 
 
 
Date: 10/8/2019 Patient Name: Octavio Villafana Patient Age and Sex: 80 y.o. female History of Presenting Illness Chief Complaint Patient presents with  Shortness of Breath  
  x 2-3 days; hx of CHF History Provided By: Patient HPI: Octavio Villafana, is a 80 y.o. 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 She denies any chest pain and her oxygen requirement has not increased from baseline. She is compliant with her Lasix, usually compliant with her diet. Denies any cough, dizziness, palpitations, syncope. No fevers or chills. In addition to her daily diuretic, she takes Zaroxolyn as needed once a week  if she retains fluid. Has not taken it recently. Pt denies any other alleviating or exacerbating factors. There are no other complaints, changes or physical findings at this time. Past Medical History:  
Diagnosis Date  Asthma  Autoimmune disease ([address] lupus  CAD (coronary artery disease)   
 cardiac cath  CAD (coronary artery disease) sick sinus syndrome  Essential hypertension  GERD (gastroesophageal reflux disease)  Glaucoma  Heart failure ([address] Lupus  Hypertension  Other ill-defined conditions(799.89)  Pacemaker  PUD (peptic ulcer disease)  S/P implantation of automatic cardioverter/defibrillator (AICD) 1/14/2015 [address] upgrade to AICD implant  SSS (sick sinus syndrome) ([facility] [address] Past Surgical History:  
Procedure Laterality Date  CARDIAC SURG PROCEDURE UNLIST    
 pacemaker/defibrilator.   
 HX GYN    
 BTL  
 HX PACEMAKER    
  WI COLONOSCOPY W/BIOPSY SINGLE/MULTIPLE  3/26/2012  WI COLSC FLX W/RMVL OF TUMOR POLYP LESION SNARE TQ  3/26/2012 PCP: Zahra Hendrix MD 
 
Past History Past Medical History: 
Past Medical History:  
Diagnosis Date  Asthma  Autoimmune disease ([facility] [address] lupus  CAD (coronary artery disease)   
 cardiac cath  CAD (coronary artery disease) sick sinus syndrome  Essential hypertension  GERD (gastroesophageal reflux disease)  Glaucoma  Heart failure ([facility] [address] Lupus  Hypertension  Other ill-defined conditions(799.89)  Pacemaker  PUD (peptic ulcer disease)  S/P implantation of automatic cardioverter/defibrillator (AICD) 1/14/2015 [address] upgrade to AICD implant  SSS (sick sinus syndrome) ([facility] [address] Past Surgical History: 
Past Surgical History:  
Procedure Laterality Date  CARDIAC SURG PROCEDURE UNLIST    
 pacemaker/defibrilator.  HX GYN    
 BTL  
 HX PACEMAKER    
 WI COLONOSCOPY W/BIOPSY SINGLE/MULTIPLE  3/26/2012  WI COLSC FLX W/RMVL OF TUMOR POLYP LESION SNARE TQ  3/26/2012 Family History: 
Family History Problem Relation Age of Onset  Arrhythmia Mother  Hypertension Mother  Hypertension Father Social History: 
Social History Tobacco Use  Smoking status: Never Smoker  Smokeless tobacco: Never Used Substance Use Topics  Alcohol use: No  
  Alcohol/week: 0.0 standard drinks  Drug use: No  
 
 
Allergies: Allergies Allergen Reactions  Contrast Agent [Iodine] Anaphylaxis Made aware of allergy 1/4/13  Sulfa (Sulfonamide Antibiotics) Hives  Codeine Anxiety  Pcn [Penicillins] Anaphylaxis Reaction was to penicillin injections into buttocks. She can take oral amoxicillin Current Medications: 
Current Facility-Administered Medications on File Prior to Encounter Medication Dose Route Frequency Provider Last Rate Last Dose  ADDaptor  vancomycin (VANCOCIN) 1,000 mg injection  0.9% sodium chloride (MBP/ADV) 0.9 % infusion  bacitracin 50,000 unit injection Current Outpatient Medications on File Prior to Encounter Medication Sig Dispense Refill  apixaban (ELIQUIS) 5 mg tablet Take 1 Tab by mouth two (2) times a day. 60 Tab 2  
 nitroglycerin (NITROSTAT) 0.4 mg SL tablet TAKE 1 TABLET UNDER THE TONGUE EVERY 5 MINTUES AS NEEDED FOR CHEST PAIN CALL 911 IF NOT RELIEVED BY 3 TABLETS 50 Tab 0  
 metOLazone (ZAROXOLYN) 2.5 mg tablet Take 1 Tab by mouth every seven (7) days. As needed for weight gain of greater than 4 pounds 30 Tab 0  
 aspirin delayed-release 81 mg tablet Take  by mouth daily.  ciprofloxacin HCl (CIPRO) 250 mg tablet Take  by mouth daily.  Oxygen  ferrous sulfate (IRON) 325 mg (65 mg iron) cpER Take  by mouth.  carvedilol (COREG) 6.25 mg tablet Take 1 Tab by mouth two (2) times daily (with meals). 60 Tab 0  
 furosemide (LASIX) 40 mg tablet Take 1 Tab by mouth two (2) times a day. 30 Tab 0  
 ARNUITY ELLIPTA 100 mcg/actuation dsdv inhaler INHALE 1 PUFF PO Q 24 H  4  
 potassium chloride SR (KLOR-CON 10) 10 mEq tablet Take 10 mEq by mouth daily. Patient takes 10 mEq daily and 20 mEq QHS  albuterol (PROVENTIL HFA, VENTOLIN HFA, PROAIR HFA) 90 mcg/actuation inhaler Take 1-2 Puffs by inhalation every four (4) hours as needed for Wheezing or Shortness of Breath.  Psyllium Husk-Sucrose (FIBER, PSYLLIUM HUSK/SUGAR,) 3.4 gram/11 gram powd Take 1 Cap by mouth daily.  mv-mn/folic acid/calcium/vit K (ONE-A-DAY WOMEN'S 50 PLUS PO) Take 1 Tab by mouth daily.     
 atorvastatin (LIPITOR) 40 mg tablet TAKE 1 TABLET BY MOUTH DAILY 90 Tab 3  
 lisinopril (PRINIVIL, ZESTRIL) 10 mg tablet TAKE 1 TABLET BY MOUTH DAILY 90 Tab 3  
  NEXIUM 40 mg capsule TAKE ONE CAPSULE BY MOUTH DAILY 30 Cap 0  
 budesonide (PULMICORT) 180 mcg/actuation aepb inhaler Take 2 puffs by inhalation two (2) times a day.  acetaminophen (TYLENOL ARTHRITIS PAIN) 650 mg CR tablet Take 650 mg by mouth every six (6) hours as needed for Pain.  cetirizine (ZYRTEC) 10 mg tablet Take 10 mg by mouth daily. Review of Systems Review of Systems Constitutional: Negative. Negative for appetite change, chills and fever. HENT: Negative for congestion, ear pain, rhinorrhea, sinus pain, trouble swallowing and voice change. Respiratory: Positive for shortness of breath. Negative for cough, chest tightness, wheezing and stridor. Cardiovascular: Positive for leg swelling. Negative for chest pain and palpitations. Gastrointestinal: Negative for abdominal pain, blood in stool, constipation, diarrhea, nausea and vomiting. Genitourinary: Negative for difficulty urinating, dysuria, flank pain, frequency and hematuria. Musculoskeletal: Negative for arthralgias and joint swelling. Skin: Negative. Neurological: Negative for dizziness, syncope, weakness, numbness and headaches. All other systems reviewed and are negative. Physical Exam  
Physical Exam  
Constitutional: She is oriented to person, place, and time. She appears well-developed and well-nourished. HENT:  
Head: Atraumatic. Mouth/Throat: Oropharynx is clear and moist.  
Eyes: Pupils are equal, round, and reactive to light. Conjunctivae and EOM are normal. No scleral icterus. Neck: Normal range of motion. Neck supple. JVD present. Cardiovascular: Normal rate, regular rhythm, normal heart sounds and intact distal pulses. Pulmonary/Chest: Effort normal. No respiratory distress. She exhibits no tenderness. Distant breath sounds Abdominal: Soft. Bowel sounds are normal. She exhibits no distension. There is no tenderness. Musculoskeletal: Normal range of motion. She exhibits edema. Neurological: She is alert and oriented to person, place, and time. No cranial nerve deficit. Skin: Skin is warm and dry. She is not diaphoretic. Nursing note and vitals reviewed. Diagnostic Study Results Labs - Recent Results (from the past 24 hour(s)) EKG, 12 LEAD, INITIAL Collection Time: 10/08/19  9:46 AM  
Result Value Ref Range Ventricular Rate 94 BPM  
 Atrial Rate 94 BPM  
 P-R Interval 158 ms QRS Duration 116 ms  
 Q-T Interval 402 ms QTC Calculation (Bezet) 502 ms Calculated P Axis 25 degrees Calculated R Axis -40 degrees Calculated T Axis 52 degrees Diagnosis Normal sinus rhythm Left axis deviation Left ventricular hypertrophy with QRS widening Nonspecific T wave abnormality Prolonged QT When compared with ECG of 16-JAN-2019 14:40, 
Questionable change in QRS duration Minimal criteria for Septal infarct are no longer present CBC WITH AUTOMATED DIFF Collection Time: 10/08/19  9:54 AM  
Result Value Ref Range WBC 5.8 3.6 - 11.0 K/uL  
 RBC 3.81 3.80 - 5.20 M/uL  
 HGB 10.3 (L) 11.5 - 16.0 g/dL HCT 35.5 35.0 - 47.0 % MCV 93.2 80.0 - 99.0 FL  
 MCH 27.0 26.0 - 34.0 PG  
 MCHC 29.0 (L) 30.0 - 36.5 g/dL  
 RDW 13.8 11.5 - 14.5 % PLATELET 210 711 - 131 K/uL MPV 9.2 8.9 - 12.9 FL  
 NRBC 0.0 0  WBC ABSOLUTE NRBC 0.00 0.00 - 0.01 K/uL NEUTROPHILS 67 32 - 75 % LYMPHOCYTES 16 12 - 49 % MONOCYTES 13 5 - 13 % EOSINOPHILS 3 0 - 7 % BASOPHILS 1 0 - 1 % IMMATURE GRANULOCYTES 0 0.0 - 0.5 % ABS. NEUTROPHILS 4.0 1.8 - 8.0 K/UL  
 ABS. LYMPHOCYTES 0.9 0.8 - 3.5 K/UL  
 ABS. MONOCYTES 0.7 0.0 - 1.0 K/UL  
 ABS. EOSINOPHILS 0.2 0.0 - 0.4 K/UL  
 ABS. BASOPHILS 0.0 0.0 - 0.1 K/UL  
 ABS. IMM. GRANS. 0.0 0.00 - 0.04 K/UL  
 DF AUTOMATED METABOLIC PANEL, COMPREHENSIVE Collection Time: 10/08/19  9:54 AM  
Result Value Ref Range Sodium 141 136 - 145 mmol/L Potassium 4.3 3.5 - 5.1 mmol/L Chloride 103 97 - 108 mmol/L  
 CO2 36 (H) 21 - 32 mmol/L Anion gap 2 (L) 5 - 15 mmol/L Glucose 94 65 - 100 mg/dL BUN 25 (H) 6 - 20 MG/DL Creatinine 1.40 (H) 0.55 - 1.02 MG/DL  
 BUN/Creatinine ratio 18 12 - 20 GFR est AA 43 (L) >60 ml/min/1.73m2 GFR est non-AA 36 (L) >60 ml/min/1.73m2 Calcium 8.5 8.5 - 10.1 MG/DL Bilirubin, total 1.1 (H) 0.2 - 1.0 MG/DL  
 ALT (SGPT) 17 12 - 78 U/L  
 AST (SGOT) 19 15 - 37 U/L Alk. phosphatase 70 45 - 117 U/L Protein, total 8.0 6.4 - 8.2 g/dL Albumin 3.7 3.5 - 5.0 g/dL Globulin 4.3 (H) 2.0 - 4.0 g/dL A-G Ratio 0.9 (L) 1.1 - 2.2 CK W/ REFLX CKMB Collection Time: 10/08/19  9:54 AM  
Result Value Ref Range CK 63 26 - 192 U/L  
TROPONIN I Collection Time: 10/08/19  9:54 AM  
Result Value Ref Range Troponin-I, Qt. <0.05 <0.05 ng/mL NT-PRO BNP Collection Time: 10/08/19  9:54 AM  
Result Value Ref Range NT pro-BNP 4,628 (H) <450 PG/ML  
URINALYSIS W/ REFLEX CULTURE Collection Time: 10/08/19  1:16 PM  
Result Value Ref Range Color YELLOW/STRAW Appearance CLEAR CLEAR Specific gravity 1.014 1.003 - 1.030    
 pH (UA) 7.5 5.0 - 8.0 Protein 30 (A) NEG mg/dL Glucose NEGATIVE  NEG mg/dL Ketone NEGATIVE  NEG mg/dL Bilirubin NEGATIVE  NEG Blood MODERATE (A) NEG Urobilinogen 1.0 0.2 - 1.0 EU/dL Nitrites POSITIVE (A) NEG Leukocyte Esterase LARGE (A) NEG    
 WBC 10-20 0 - 4 /hpf  
 RBC 10-20 0 - 5 /hpf Epithelial cells MODERATE (A) FEW /lpf Bacteria 1+ (A) NEG /hpf  
 UA:UC IF INDICATED URINE CULTURE ORDERED (A) CNI Yeast PRESENT (A) NEG Other: Renal Epithelial cells Present Radiologic Studies -  
XR CHEST PA LAT Final Result IMPRESSION:  
1. Findings suggestive of cardiomegaly. No definite evidence of congestive  
change. 2. Evidence of pulmonary hyperaeration. 3. Slight prominence of the pulmonary interstitial markings. No definite  
evidence of lobar consolidation. 4. Elevation of the right hemidiaphragm. Medical Decision Making I am the first provider for this patient. Records Reviewed: I reviewed our electronic medical record system for any past medical records that were available that may contribute to the patient's current condition, including their PMH, surgical history, social and family history. Reviewed the nursing notes and vital signs from today's visit. Vital Signs-Reviewed the patient's vital signs. Patient Vitals for the past 24 hrs: 
 Temp Pulse Resp BP SpO2  
10/08/19 1300  91 20 142/85 97 % 10/08/19 1215  90 18 138/84 97 % 10/08/19 1130  93 23 144/78 93 % 10/08/19 0940 98.6 °F (37 °C) 94 18 (!) 153/95 95 % Provider Notes (Medical Decision Making):  
80-year-old female presents to the emergency department with fluid retention, worsening lower extremity edema, mild shortness of breath but without an increase in her oxygen requirement. DDX: CHF, ACS, renal failure, venous insufficiency. She is overall comfortable appearing and our work-up here in the emergency room included a full metabolic panel, troponin, chest x-ray, BNP, ECG. Her symptoms are as expected  secondary to congestive heart failure exacerbation and subsequent fluid retention. Her chest x-ray shows no pulmonary edema and she is on any respiratory distress. Requires baseline amount of oxygen. BNP is increased, also consistent with the diagnosis. Serial troponins are negative. She reports having a good response to her home dose of Lasix and I have given her an IV dose here in the emergency room after which she has produced a good amount of urine. I have paged cardiology 3 times and have not heard back from them yet. I have discussed the patient's diagnosis and results with her.   I have engaged her and her family in joint decision-making. The patient does not want to stay in the hospital because she feels well enough to go home and in absence of any evidence of acute cardiac ischemia or pulmonary edema, I think that going home is not unreasonable. I have advised her to take Zaroxolyn today to augment diuresis, to closely monitor her urine output as well as weight. She will call her cardiologist this afternoon to schedule an appointment in the next 1 to 2 days. We have reviewed in detail her discharge instructions as well as symptoms that, if present, should prompt her to immediately return to us or the nearest emergency room. ED Course:  
Initial assessment performed. The patients presenting problems have been discussed, and they are in agreement with the care plan formulated and outlined with them. I have encouraged them to ask questions as they arise throughout their visit. Medications Administered During ED Course: 
Medications  
furosemide (LASIX) injection 40 mg (40 mg IntraVENous Given 10/8/19 7078) Progress note: 
Patient has been reassessed and reports feeling considerably better, has normal vital signs and feels comfortable going home. I think this is reasonable as no findings today suggest a life-threatening condition. DISPOSITION: DISCHARGE The patient's results have been reviewed with patient and available family and/or caregiver. They verbally convey their understanding and agreement of the patient's signs, symptoms, diagnosis, treatment and prognosis and additionally agree to follow up as recommended in the discharge instructions or to return to the Emergency Department should the patient's condition change prior to their follow-up appointment. The patient and available family and/or caregiver verbally agree with the care plan and all of their questions have been answered.  The discharge instructions have also been provided to the them with educational information regarding the patient's diagnosis as well a list of reasons why the patient would want to return to the ER prior to their follow-up appointment should any concerns arise, the patient's condition change or symptoms worsen. Tom Mayo MD, MSc 
 
 
Diagnosis Clinical Impression: 1. Acute on chronic congestive heart failure, unspecified heart failure type ([facility] [address] Attestation: 
I personally performed the services described in this documentation on this date 10/8/2019 for patient Laverne Ramsey. Tom Mayo MD 
 
Please note that this dictation was completed with [employer], the computer voice recognition software. Quite often unanticipated grammatical, syntax, homophones, and other interpretive errors are inadvertently transcribed by the computer software. Please disregard these errors. Please excuse any errors that have escaped final proofreading.

## 2019-10-08 NOTE — DISCHARGE INSTRUCTIONS
Patient Education        Heart Failure: Care Instructions  Your Care Instructions    Heart failure occurs when your heart does not pump as much blood as the body needs. Failure does not mean that the heart has stopped pumping but rather that it is not pumping as well as it should. Over time, this causes fluid buildup in your lungs and other parts of your body. Fluid buildup can cause shortness of breath, fatigue, swollen ankles, and other problems. By taking medicines regularly, reducing sodium (salt) in your diet, checking your weight every day, and making lifestyle changes, you can feel better and live longer. Follow-up care is a key part of your treatment and safety. Be sure to make and go to all appointments, and call your doctor if you are having problems. It's also a good idea to know your test results and keep a list of the medicines you take. How can you care for yourself at home? Medicines    · Be safe with medicines. Take your medicines exactly as prescribed. Call your doctor if you think you are having a problem with your medicine.     · Do not take any vitamins, over-the-counter medicine, or herbal products without talking to your doctor first. Dallas Balling not take ibuprofen (Advil or Motrin) and naproxen (Aleve) without talking to your doctor first. They could make your heart failure worse.     · You may take some of the following medicine. ? Angiotensin-converting enzyme inhibitors (ACEIs) or angiotensin II receptor blockers (ARBs) reduce the heart's workload, lower blood pressure, and reduce swelling. Taking an ACEI or ARB may lower your chance of needing to be hospitalized. ? Beta-blockers can slow heart rate, decrease blood pressure, and improve your condition. Taking a beta-blocker may lower your chance of needing to be hospitalized. ? Diuretics, also called water pills, reduce swelling.    You will get more details on the specific medicines your doctor prescribes.   Diet    · Your doctor may suggest that you limit sodium. Your doctor can tell you how much sodium is right for you. An example is less than 3,000 mg a day. This includes all the salt you eat in cooking or in packaged foods. People get most of their sodium from processed foods. Fast food and restaurant meals also tend to be very high in sodium.     · Ask your doctor how much liquid you can drink each day. You may have to limit liquids.    Weight    · Weigh yourself without clothing at the same time each day. Record your weight. Call your doctor if you have a sudden weight gain, such as more than 2 to 3 pounds in a day or 5 pounds in a week. (Your doctor may suggest a different range of weight gain.) A sudden weight gain may mean that your heart failure is getting worse.    Activity level    · Start light exercise (if your doctor says it is okay). Even if you can only do a small amount, exercise will help you get stronger, have more energy, and manage your weight and your stress. Walking is an easy way to get exercise. Start out by walking a little more than you did before. Bit by bit, increase the amount you walk.     · When you exercise, watch for signs that your heart is working too hard. You are pushing yourself too hard if you cannot talk while you are exercising. If you become short of breath or dizzy or have chest pain, stop, sit down, and rest.     · If you feel \"wiped out\" the day after you exercise, walk slower or for a shorter distance until you can work up to a better pace.     · Get enough rest at night. Sleeping with 1 or 2 pillows under your upper body and head may help you breathe easier.    Lifestyle changes    · Do not smoke. Smoking can make a heart condition worse. If you need help quitting, talk to your doctor about stop-smoking programs and medicines. These can increase your chances of quitting for good.  Quitting smoking may be the most important step you can take to protect your heart.     · Limit alcohol to 2 drinks a day for men and 1 drink a day for women. Too much alcohol can cause health problems.     · Avoid getting sick from colds and the flu. Get a pneumococcal vaccine shot. If you have had one before, ask your doctor whether you need another dose. Get a flu shot each year. If you must be around people with colds or the flu, wash your hands often. When should you call for help? Call 911 if you have symptoms of sudden heart failure such as:    · You have severe trouble breathing.     · You cough up pink, foamy mucus.     · You have a new irregular or rapid heartbeat.    Call your doctor now or seek immediate medical care if:    · You have new or increased shortness of breath.     · You are dizzy or lightheaded, or you feel like you may faint.     · You have sudden weight gain, such as more than 2 to 3 pounds in a day or 5 pounds in a week. (Your doctor may suggest a different range of weight gain.)     · You have increased swelling in your legs, ankles, or feet.     · You are suddenly so tired or weak that you cannot do your usual activities.    Watch closely for changes in your health, and be sure to contact your doctor if you develop new symptoms. Where can you learn more? Go to http://nikki-elizabeth.info/. Enter M646 in the search box to learn more about \"Heart Failure: Care Instructions. \"  Current as of: April 9, 2019  Content Version: 12.2  © 2125-1494 [employer]. Care instructions adapted under license by [employer] (which disclaims liability or warranty for this information). If you have questions about a medical condition or this instruction, always ask your healthcare professional. [address] any warranty or liability for your use of this information.

## 2019-10-25 NOTE — ED PROVIDER NOTES
EMERGENCY DEPARTMENT HISTORY AND PHYSICAL EXAM 
     
 
Date: 10/25/2019 Patient Name: Christelle Stout History of Presenting Illness Chief Complaint Patient presents with  Epistaxis History Provided By: Patient and Patient's Daughter HPI: Christelle Stout is a 80 y.o. female, pmhx hypertension, asthma, glaucoma, CAD and sick sinus syndrome on Eliquis, who presents via EMS with family to the ED c/o epistaxis. History provided by her daughter as patient has developing dementia. Daughter states that earlier tonight she had an episode of epistaxis lasted approximately 30 minutes which they stopped with some cool compresses. Shortly thereafter started bleeding again and they were unable to stop the blood. They called the cardiologist's office and the answering service recommended they come to the emergency room. PCP: Luiz Johnson MD 
 
Allergies: Codeine, sulfa and penicillin Social Hx: -tobacco, -EtOH, -Illicit Drugs There are no other complaints, changes, or physical findings at this time. Current Outpatient Medications Medication Sig Dispense Refill  apixaban (ELIQUIS) 5 mg tablet Take 1 Tab by mouth two (2) times a day. 60 Tab 2  
 nitroglycerin (NITROSTAT) 0.4 mg SL tablet TAKE 1 TABLET UNDER THE TONGUE EVERY 5 MINTUES AS NEEDED FOR CHEST PAIN CALL 911 IF NOT RELIEVED BY 3 TABLETS 50 Tab 0  
 metOLazone (ZAROXOLYN) 2.5 mg tablet Take 1 Tab by mouth every seven (7) days. As needed for weight gain of greater than 4 pounds 30 Tab 0  
 aspirin delayed-release 81 mg tablet Take  by mouth daily.  ciprofloxacin HCl (CIPRO) 250 mg tablet Take  by mouth daily.  Oxygen  ferrous sulfate (IRON) 325 mg (65 mg iron) cpER Take  by mouth.  carvedilol (COREG) 6.25 mg tablet Take 1 Tab by mouth two (2) times daily (with meals). 60 Tab 0  
 furosemide (LASIX) 40 mg tablet Take 1 Tab by mouth two (2) times a day.  30 Tab 0  
  ARNUITY ELLIPTA 100 mcg/actuation dsdv inhaler INHALE 1 PUFF PO Q 24 H  4  
 potassium chloride SR (KLOR-CON 10) 10 mEq tablet Take 10 mEq by mouth daily. Patient takes 10 mEq daily and 20 mEq QHS  albuterol (PROVENTIL HFA, VENTOLIN HFA, PROAIR HFA) 90 mcg/actuation inhaler Take 1-2 Puffs by inhalation every four (4) hours as needed for Wheezing or Shortness of Breath.  Psyllium Husk-Sucrose (FIBER, PSYLLIUM HUSK/SUGAR,) 3.4 gram/11 gram powd Take 1 Cap by mouth daily.  mv-mn/folic acid/calcium/vit K (ONE-A-DAY WOMEN'S 50 PLUS PO) Take 1 Tab by mouth daily.  atorvastatin (LIPITOR) 40 mg tablet TAKE 1 TABLET BY MOUTH DAILY 90 Tab 3  
 lisinopril (PRINIVIL, ZESTRIL) 10 mg tablet TAKE 1 TABLET BY MOUTH DAILY 90 Tab 3  
 NEXIUM 40 mg capsule TAKE ONE CAPSULE BY MOUTH DAILY 30 Cap 0  
 budesonide (PULMICORT) 180 mcg/actuation aepb inhaler Take 2 puffs by inhalation two (2) times a day.  acetaminophen (TYLENOL ARTHRITIS PAIN) 650 mg CR tablet Take 650 mg by mouth every six (6) hours as needed for Pain.  cetirizine (ZYRTEC) 10 mg tablet Take 10 mg by mouth daily. Past History Past Medical History: 
Past Medical History:  
Diagnosis Date  Asthma  Autoimmune disease ([facility] [address] lupus  CAD (coronary artery disease)   
 cardiac cath  CAD (coronary artery disease) sick sinus syndrome  Essential hypertension  GERD (gastroesophageal reflux disease)  Glaucoma  Heart failure ([facility] [address] Lupus  Hypertension  Other ill-defined conditions(799.89)  Pacemaker  PUD (peptic ulcer disease)  S/P implantation of automatic cardioverter/defibrillator (AICD) 1/14/2015 [address] upgrade to AICD implant  SSS (sick sinus syndrome) ([facility] [address] Past Surgical History: 
Past Surgical History:  
Procedure Laterality Date  CARDIAC SURG PROCEDURE UNLIST    
 pacemaker/defibrilator.   
 HX GYN    
 BTL  
 HX PACEMAKER    
  MD COLONOSCOPY W/BIOPSY SINGLE/MULTIPLE  3/26/2012  MD COLSC FLX W/RMVL OF TUMOR POLYP LESION SNARE TQ  3/26/2012 Family History: 
Family History Problem Relation Age of Onset  Arrhythmia Mother  Hypertension Mother  Hypertension Father Social History: 
Social History Tobacco Use  Smoking status: Never Smoker  Smokeless tobacco: Never Used Substance Use Topics  Alcohol use: No  
  Alcohol/week: 0.0 standard drinks  Drug use: No  
 
 
Allergies: Allergies Allergen Reactions  Contrast Agent [Iodine] Anaphylaxis Made aware of allergy 1/4/13  Sulfa (Sulfonamide Antibiotics) Hives  Codeine Anxiety  Pcn [Penicillins] Anaphylaxis Reaction was to penicillin injections into buttocks. She can take oral amoxicillin Review of Systems Review of Systems Constitutional: Negative for activity change, appetite change, chills, fever and unexpected weight change. HENT: Positive for nosebleeds. Negative for congestion. Eyes: Negative for pain and visual disturbance. Respiratory: Negative for cough and shortness of breath. Cardiovascular: Negative for chest pain. Gastrointestinal: Negative for abdominal pain, diarrhea, nausea and vomiting. Genitourinary: Negative for dysuria. Musculoskeletal: Negative for back pain. Skin: Negative for rash. Neurological: Negative for headaches. Hematological: Bruises/bleeds easily (Family noticed a bruise to her chest without any trauma). Physical Exam  
Physical Exam  
Constitutional: She is oriented to person, place, and time. She appears well-developed and well-nourished. Elderly, anxious female with slight pressured speech HENT:  
Head: Normocephalic and atraumatic. Mouth/Throat: Oropharynx is clear and moist.  
Active epistaxis from the left nares Eyes: Pupils are equal, round, and reactive to light.  Conjunctivae and EOM are normal. Right eye exhibits no discharge. Left eye exhibits no discharge. Neck: Normal range of motion. Neck supple. Cardiovascular: Regular rhythm and normal heart sounds. No murmur heard. Tachycardia 110s Pulmonary/Chest: Effort normal and breath sounds normal. No respiratory distress. She has no wheezes. She has no rales. Abdominal: Soft. Bowel sounds are normal. She exhibits no distension. There is no tenderness. Musculoskeletal: Normal range of motion. She exhibits no edema. Neurological: She is alert and oriented to person, place, and time. No cranial nerve deficit. She exhibits normal muscle tone. Skin: Skin is warm and dry. No rash noted. She is not diaphoretic. Small bruise [place] chest  
Nursing note and vitals reviewed. Diagnostic Study Results Labs - Recent Results (from the past 12 hour(s)) CBC WITH AUTOMATED DIFF Collection Time: 10/25/19  2:37 AM  
Result Value Ref Range WBC 6.5 3.6 - 11.0 K/uL  
 RBC 3.76 (L) 3.80 - 5.20 M/uL  
 HGB 10.4 (L) 11.5 - 16.0 g/dL HCT 34.9 (L) 35.0 - 47.0 % MCV 92.8 80.0 - 99.0 FL  
 MCH 27.7 26.0 - 34.0 PG  
 MCHC 29.8 (L) 30.0 - 36.5 g/dL  
 RDW 13.5 11.5 - 14.5 % PLATELET 388 115 - 834 K/uL MPV 9.7 8.9 - 12.9 FL  
 NRBC 0.0 0  WBC ABSOLUTE NRBC 0.00 0.00 - 0.01 K/uL NEUTROPHILS 66 32 - 75 % LYMPHOCYTES 14 12 - 49 % MONOCYTES 16 (H) 5 - 13 % EOSINOPHILS 3 0 - 7 % BASOPHILS 1 0 - 1 % IMMATURE GRANULOCYTES 0 0.0 - 0.5 % ABS. NEUTROPHILS 4.3 1.8 - 8.0 K/UL  
 ABS. LYMPHOCYTES 0.9 0.8 - 3.5 K/UL  
 ABS. MONOCYTES 1.0 0.0 - 1.0 K/UL  
 ABS. EOSINOPHILS 0.2 0.0 - 0.4 K/UL  
 ABS. BASOPHILS 0.1 0.0 - 0.1 K/UL  
 ABS. IMM. GRANS. 0.0 0.00 - 0.04 K/UL  
 DF AUTOMATED METABOLIC PANEL, COMPREHENSIVE Collection Time: 10/25/19  2:37 AM  
Result Value Ref Range Sodium 140 136 - 145 mmol/L Potassium 4.1 3.5 - 5.1 mmol/L  Chloride 100 97 - 108 mmol/L  
 CO2 35 (H) 21 - 32 mmol/L  
 Anion gap 5 5 - 15 mmol/L Glucose 103 (H) 65 - 100 mg/dL BUN 34 (H) 6 - 20 MG/DL Creatinine 1.40 (H) 0.55 - 1.02 MG/DL  
 BUN/Creatinine ratio 24 (H) 12 - 20 GFR est AA 43 (L) >60 ml/min/1.73m2 GFR est non-AA 36 (L) >60 ml/min/1.73m2 Calcium 8.4 (L) 8.5 - 10.1 MG/DL Bilirubin, total 1.0 0.2 - 1.0 MG/DL  
 ALT (SGPT) 17 12 - 78 U/L  
 AST (SGOT) 29 15 - 37 U/L Alk. phosphatase 72 45 - 117 U/L Protein, total 8.0 6.4 - 8.2 g/dL Albumin 3.5 3.5 - 5.0 g/dL Globulin 4.5 (H) 2.0 - 4.0 g/dL A-G Ratio 0.8 (L) 1.1 - 2.2 PROTHROMBIN TIME + INR Collection Time: 10/25/19  2:37 AM  
Result Value Ref Range INR 1.2 (H) 0.9 - 1.1 Prothrombin time 12.3 (H) 9.0 - 11.1 sec Radiologic Studies - No orders to display CT Results  (Last 48 hours) None CXR Results  (Last 48 hours) None Medical Decision Making I am the first provider for this patient. I reviewed the vital signs, available nursing notes, past medical history, past surgical history, family history and social history. Vital Signs-Reviewed the patient's vital signs. Patient Vitals for the past 12 hrs: 
 Temp Pulse Resp BP SpO2  
10/25/19 0546  87     
10/25/19 0330    138/86 99 % 10/25/19 0326    (!) 144/92 98 % 10/25/19 0245    144/80 99 % 10/25/19 0240    144/90 98 % 10/25/19 0228    (!) 149/99 92 % 10/25/19 0215 97.9 °F (36.6 °C) (!) 117 20   Pulse Oximetry Analysis - 86% on RA; 98% on 2 L oxygen cannula placed in the mouth Cardiac Monitor:  
Rate: 115bpm 
Rhythm: Sinus Tachycardia Records Reviewed: Nursing Notes, Old Medical Records, Previous Radiology Studies and Previous Laboratory Studies Provider Notes (Medical Decision Making): MDM: Elderly female on Eliquis presenting with continued epistaxis. Afrin and nasal clamp have been placed and will monitor bleeding. ED Course: Initial assessment performed. The patients presenting problems have been discussed, and they are in agreement with the care plan formulated and outlined with them. I have encouraged them to ask questions as they arise throughout their visit. PROGRESS NOTE: 
2:40 AM 
Pt does not appear to be bleeding with a nasal clamp; will continue compression and monitor bleeding. Await lab results. 3:10 AM 
Patient continues with bleeding as she removed the clamp from her nose. Rhino Rocket was placed in the left nares, see below. Patient's daughter explains that with her level of dementia she is can pull out the packing and she cannot go home with anything in her nose. We will try some oral medication for pain control leave it in there for period of time and then remove it to see if we can find a site to cauterize. Procedure Note - Epistaxis Management:  
3:12 AM 
Performed by: Halima Wood MD  . Complexity: simple After administration of oxymetozline, the patient underwent direct pressure application for 45 minutes. When this failed, a Rhino Rocket 5.5 cm was placed in the left naris. Hemostasis was achieved after placement. Estimated blood loss: 10ml The procedure took 16-30 minutes spent one-on-one at patient's bedside, and pt tolerated moderately. 4 AM 
Patient's packing is been removed. There is no active site of bleeding. The patient is rinsed her mouth posterior pharynx and there is no evidence of bleeding posteriorly as well. We will continue monitoring as there is nothing to cauterize now to see if anything starts bleeding again. Recommend patient to hold Eliquis this morning. 4:40 AM 
Patient continues to do well without any bleeding. Systolic blood pressure remained stable and her heart rate is decreased to the 80s. Discharge note: 
4:40 AM 
Pt re-evaluated and noted to be feeling better, ready for discharge. Updated pt and family on all final lab findings. Will follow up as instructed. All questions have been answered, pt voiced understanding and agreement with plan. Specific return precautions provided as well as instructions to return to the ED should sx worsen at any time. Vital signs stable for discharge. Critical Care Time:  
0 Diagnosis Clinical Impression: 1. Epistaxis PLAN: 
1. Discharge Medication List as of 10/25/2019  4:28 AM  
  
 
2. Follow-up Information Follow up With Specialties Details Why Contact Info Brando Loving MD [facility] Schedule an appointment as soon as possible for a visit For recheck this week [facility] 1st floor [place] [address] [phone] 
[phone] \[facility] [place] [facility]\"" EMERGENCY DEPT Emergency Medicine  If symptoms worsen with recurrent bleeding [address] [place] [address] [address] [facility] 
[phone] Return to ED if worse Disposition: 
home Please note, this dictation was completed with [employer], the computer voice recognition software. Quite often unanticipated grammatical, syntax, homophones, and other interpretive errors are inadvertently transcribed by the computer software. Please disregard these errors. Please excuse any errors that have escaped final proof reading.

## 2019-10-25 NOTE — ED NOTES
Patient presents with EMS for Epitaxis. Patient is A&O x3. Per EMS report she had a nosebleed earlier and she was able to stop it however now she can not stop it and family was concerned with her blood lost. Patient wears O2 at 2 L at baseline.

## 2019-10-25 NOTE — ED NOTES
Bedside and Verbal shift change report given to [facility] (oncoming nurse) by Odalis Dempsey RN (offgoing nurse). Report included the following information SBAR, ED Summary, MAR and Recent Results.

## 2019-10-25 NOTE — ED NOTES
Dr. Jun Toledo reviewed discharge instructions with the patient. The patient verbalized understanding. All questions and concerns were addressed. The patient declined a wheelchair and is discharged ambulatory in the care of family members with instructions and prescriptions in hand. Pt is alert and oriented x 4. Respirations are clear and unlabored.

## 2019-10-25 NOTE — DISCHARGE INSTRUCTIONS
You were seen in the emergency room for nosebleeding. Do not take your Eliquis today, Saturday, you may resume it on Sunday if you do not have any further bleeding. Patient Education        Nosebleeds: Care Instructions  Your Care Instructions    Nosebleeds are common, especially if you have colds or allergies. Many things can cause a nosebleed. Some nosebleeds stop on their own with pressure. Others need packing. Some get cauterized (sealed). If you have gauze or other packing materials in your nose, you will need to follow up with your doctor to have the packing removed. You may need more treatment if you get nosebleeds a lot. The doctor has checked you carefully, but problems can develop later. If you notice any problems or new symptoms, get medical treatment right away. Follow-up care is a key part of your treatment and safety. Be sure to make and go to all appointments, and call your doctor if you are having problems. It's also a good idea to know your test results and keep a list of the medicines you take. How can you care for yourself at home? · If you get another nosebleed:  ? Sit up and tilt your head slightly forward. This keeps blood from going down your throat. ? Use your thumb and index finger to pinch your nose shut for 10 minutes. Use a clock. Do not check to see if the bleeding has stopped before the 10 minutes are up. If the bleeding has not stopped, pinch your nose shut for another 10 minutes. ? When the bleeding has stopped, try not to pick, rub, or blow your nose for 12 hours. Avoiding these things helps keep your nose from bleeding again. · If your doctor prescribed antibiotics, take them as directed. Do not stop taking them just because you feel better. You need to take the full course of antibiotics. To prevent nosebleeds  · Do not blow your nose too hard. · Try not to lift or strain after a nosebleed. · Raise your head on a pillow while you sleep.   · Put a thin layer of a saline- or water-based nasal gel, such as NasoGel, inside your nose. Put it on the septum, which divides your nostrils. This will prevent dryness that can cause nosebleeds. · Use a vaporizer or humidifier to add moisture to your bedroom. Follow the directions for cleaning the machine. · Do not use aspirin, ibuprofen (Advil, Motrin), or naproxen (Aleve) for 36 to 48 hours after a nosebleed unless your doctor tells you to. You can use acetaminophen (Tylenol) for pain relief. · Talk to your doctor about stopping any other medicines you are taking. Some medicines may make you more likely to get a nosebleed. · Do not use cold medicines or nasal sprays without first talking to your doctor. They can make your nose dry. When should you call for help? Call 911 anytime you think you may need emergency care. For example, call if:    · You passed out (lost consciousness).    Call your doctor now or seek immediate medical care if:    · You get another nosebleed and your nose is still bleeding after you have applied pressure 3 times for 10 minutes each time (30 minutes total).     · There is a lot of blood running down the back of your throat even after you pinch your nose and tilt your head forward.     · You have a fever.     · You have sinus pain.    Watch closely for changes in your health, and be sure to contact your doctor if:    · You get nosebleeds often, even if they stop.     · You do not get better as expected. Where can you learn more? Go to http://nikki-elizabeth.info/. Enter S156 in the search box to learn more about \"Nosebleeds: Care Instructions. \"  Current as of: June 26, 2019  Content Version: 12.2  © 0817-6518 [employer]. Care instructions adapted under license by [employer] (which disclaims liability or warranty for this information).  If you have questions about a medical condition or this instruction, always ask your healthcare professional. [employer] disclaims any warranty or liability for your use of this information.

## 2019-11-20 NOTE — ED NOTES
I have reviewed discharge instructions with the patient. The patient verbalized understanding. Assisted to w/c with oxygen, assisted to private vehicle by family, alert and stable for discharge.

## 2019-11-20 NOTE — DISCHARGE INSTRUCTIONS
Thank you for allowing us to take care of you today! We hope we addressed all of your concerns and needs. We strive to provide excellent quality care in the Emergency Department. You will receive a survey after your visit to evaluate the care you were provided. Should you receive a survey from us, we invite you to share your experience and tell us what made it excellent. It was a pleasure serving you, we invite you to share your experience with us, in our pursuit for excellence, should you be selected to receive a survey. The exam and treatment you received in the Emergency Department were for an urgent problem and are not intended as complete care. It is important that you follow up with a doctor, nurse practitioner, or physician assistant for ongoing care. If your symptoms become worse or you do not improve as expected and you are unable to reach your usual health care provider, you should return to the Emergency Department. We are available 24 hours a day. Please take your discharge instructions with you when you go to your follow-up appointment. If you have any problem arranging a follow-up appointment, contact the Emergency Department immediately. If a prescription has been provided, please have it filled as soon as possible to prevent a delay in treatment. Read the entire medication instruction sheet provided to you by the pharmacy. If you have any questions or reservations about taking the medication due to side effects or interactions with other medications, please call your primary care physician or contact the ER to speak with the charge nurse. Make an appointment with your family doctor or the physician you were referred to for follow-up of this visit as instructed on your discharge paperwork, as this is mandatory follow-up. Return to the ER if you are unable to be seen or if you are unable to be seen in a timely manner.     If you have any problem arranging the follow-up visit, contact the Emergency Department immediately. I hope you feel better and thank you again for allow us to provide you with excellent care today at [facility]! Warmest regards,    Jayant Eddy MD  Emergency Medicine Physician  [facility]        _____________________________________________________________________________________________________________    Vitals:    11/20/19 0908   BP: 138/87   BP 1 Location: Left arm   BP Patient Position: Sitting   Pulse: 96   Resp: 18   Temp: 97.9 °F (36.6 °C)   SpO2: 93%   Weight: 89.8 kg (198 lb)   Height: 5' 5\" (1.651 m)       No results found for this or any previous visit (from the past 12 hour(s)).     No orders to display     CT Results  (Last 48 hours)    None          Local Primary Care Physicians   [facility] [phone]  MD Marc Rajput, MD Sven Robert MD [facility] [phone]  Chris Fernandez, [facility]  Madhu Bansal, MD Kevin Navarro, MD Blaise Marin [place] [address] [phone]  Gary [place] [address] [facility] [phone]  MD Matty Page, MD Giuliana Wilson, MD Tony Zuniga MD   [facility] [phone]  [place], MD Jennifer León, NP [address] [phone]  Lawrence Vera, MD Belia Romero, MD Tiara Latif, MD Eric Powers, MD Ibeth Mayen, MD Dola Cabot, MD Brain Moreira, MD   [facility] [place] [address], MD [facility] [phone]  Alana Yee, MD Jose Luis Noland, CHRISTOPHER Cleaning, MD Omid Delacruz, MD Judson Montejo, MD Mayco Penny, MD Jenny Coronel, MD   [address] [facility] [phone]  Heidy Bernal, MD Codie Nunez, P  Ching Vaca, CHRISTOPHER Holley, MD Pili James, MD Lynette Coyne, MD Eliana Mclain Iesha Fernandez, [address] [facility] [phone]  MD Garett Davis MD Ethyl Frames, MD Ike Garland, MD Adell Neth, MD   [facility] [phone]  Lotus Bloomer, MD Zetta Paget, MD [facility] [phone]  MD Michael Smith MD Renita Bolt, MD   [facility] [phone]  MD Danae Munoz, MD Billie Taylor MD Davonna Dow, MD Therisa Pickler, NP  Sharon Avalos, MD [address] [place] [address]   [phone]  MD Miranda Paez MD Carollynn Comas, MD     [address] [facility] [phone]  Ko Pacheco, MD Chapito Figueroa, FNP  Lev Ferris, JACKI Ferris, P  Martir Meneses, MD Flash Garay, NP   Mary Hawkins, DO   Miscellaneous:  Pb Rodriguez MD [facility] Departments   For adult and child immunizations, family planning, TB screening, STD testing and women's health services. [facility]: [place] [phone]     [place] [address]: [facility] Husbands [address] [phone] 2400 [facility]        [address] [place] [address]  For primary care services, woman and child wellness, and some clinics providing specialty care. [facility] -- [address] [facility] [address] [place] [address] [phone]   [address] [place] [address] [facility] [address] [facility] [phone]   [address] [place] [address] [address] [place] [address] [phone] [address] [facility] [employer] [address] [place] [address] [address] [place] [address] [phone] [address] [facility] [address]  [address] [address] [place] [phone]   [facility] [address] [place] [address] [phone]   Slime Ortiz [address], [address] [place] [address]   [facility]: [facility] [address]     [place] [address] Outreach [address] [facility] [phone]   [employer]  [address] ([email or web address])         Sexual Health/Woman Wellness Clinics   For STD/HIV testing and treatment, pregnancy testing and services, men's health, birth control services, LGBT services, and hepatitis/HPV vaccine services. [employer] for [place] [employer] [address] [facility] [address] Padilla [phone]   [facility] [address], 5th floor [phone]   Pregnancy [address] [facility] [address] [phone]        [place] [address] [facility] [address]   [phone]   [place]   [phone]   Women, Infant and Children's Services: [facility] [phone]       [facility] the [address]    [phone]   [address]   [phone]   [address]   [address]       Patient Education        Nosebleeds: Care Instructions  Your Care Instructions    Nosebleeds are common, especially if you have colds or allergies. Many things can cause a nosebleed. Some nosebleeds stop on their own with pressure. Others need packing. Some get cauterized (sealed). If you have gauze or other packing materials in your nose, you will need to follow up with your doctor to have the packing removed. You may need more treatment if you get nosebleeds a lot. The doctor has checked you carefully, but problems can develop later. If you notice any problems or new symptoms, get medical treatment right away.   Follow-up care is a key part of your treatment and safety. Be sure to make and go to all appointments, and call your doctor if you are having problems. It's also a good idea to know your test results and keep a list of the medicines you take. How can you care for yourself at home? · If you get another nosebleed:  ? Sit up and tilt your head slightly forward. This keeps blood from going down your throat. ? Use your thumb and index finger to pinch your nose shut for 10 minutes. Use a clock. Do not check to see if the bleeding has stopped before the 10 minutes are up. If the bleeding has not stopped, pinch your nose shut for another 10 minutes. ? When the bleeding has stopped, try not to pick, rub, or blow your nose for 12 hours. Avoiding these things helps keep your nose from bleeding again. · If your doctor prescribed antibiotics, take them as directed. Do not stop taking them just because you feel better. You need to take the full course of antibiotics. To prevent nosebleeds  · Do not blow your nose too hard. · Try not to lift or strain after a nosebleed. · Raise your head on a pillow while you sleep. · Put a thin layer of a saline- or water-based nasal gel, such as NasoGel, inside your nose. Put it on the septum, which divides your nostrils. This will prevent dryness that can cause nosebleeds. · Use a vaporizer or humidifier to add moisture to your bedroom. Follow the directions for cleaning the machine. · Do not use aspirin, ibuprofen (Advil, Motrin), or naproxen (Aleve) for 36 to 48 hours after a nosebleed unless your doctor tells you to. You can use acetaminophen (Tylenol) for pain relief. · Talk to your doctor about stopping any other medicines you are taking. Some medicines may make you more likely to get a nosebleed. · Do not use cold medicines or nasal sprays without first talking to your doctor. They can make your nose dry. When should you call for help? Call 911 anytime you think you may need emergency care.  For example, call if:    · You passed out (lost consciousness).    Call your doctor now or seek immediate medical care if:    · You get another nosebleed and your nose is still bleeding after you have applied pressure 3 times for 10 minutes each time (30 minutes total).     · There is a lot of blood running down the back of your throat even after you pinch your nose and tilt your head forward.     · You have a fever.     · You have sinus pain.    Watch closely for changes in your health, and be sure to contact your doctor if:    · You get nosebleeds often, even if they stop.     · You do not get better as expected. Where can you learn more? Go to http://nikki-elizabeth.info/. Enter S156 in the search box to learn more about \"Nosebleeds: Care Instructions. \"  Current as of: June 26, 2019  Content Version: 12.2  © 4519-3119 [employer]. Care instructions adapted under license by [employer] (which disclaims liability or warranty for this information). If you have questions about a medical condition or this instruction, always ask your healthcare professional. [address] any warranty or liability for your use of this information.

## 2019-11-23 NOTE — ED PROVIDER NOTES
EMERGENCY DEPARTMENT HISTORY AND PHYSICAL EXAM 
 
 
Please note that this dictation was completed with Guruji, the computer voice recognition software. Quite often unanticipated grammatical, syntax, homophones, and other interpretive errors are inadvertently transcribed by the computer software. Please disregard these errors and any errors that have escaped final proofreading. Thank you. Date: 11/20/2019 Patient Name: Sandie Rendon Patient Age and Sex: 80 y.o. female History of Presenting Illness Chief Complaint Patient presents with  Epistaxis  
  pt arrives by EMS with recurrent nose bleeds, has been using oxygen that is not humidified History Provided By: Patient and EMS 
 
HPI: Sandie Rendon, 80 y.o. female with past medical history as documented below presents to the ED with c/o of acute onset of left sided epistaxis PTA. Pt reports wearing 2 to 3L oxygen at baseline and reports having dry nares for the past week. Today, she noticed acute onset of moderate intensity epistaxis from her left nares. She denies any trauma. She does wear a nasal cannula 24 hours a day and states the dry weather likely exacerbated the sx's. She tried applying pressure without relief. Pt is also on chronic Eliquis for Afib. Pt denies any other alleviating or exacerbating factors. Additionally, pt specifically denies any recent fever, chills, headache, nausea, vomiting, abdominal pain, CP, SOB, lightheadedness, dizziness, numbness, weakness, BLE swelling, heart palpitations, urinary sxs, diarrhea, constipation, melena, hematochezia, cough, or congestion. There are no other complaints, changes or physical findings at this time. PCP: Rose Hartmann MD 
 
Past History Past Medical History: 
Past Medical History:  
Diagnosis Date  Asthma  Autoimmune disease ([facility] [address] lupus  CAD (coronary artery disease)   
 cardiac cath  CAD (coronary artery disease) sick sinus syndrome  Essential hypertension  GERD (gastroesophageal reflux disease)  Glaucoma  Heart failure ([facility] [place] [address] Lupus  Hypertension  Other ill-defined conditions(799.89)  Pacemaker  PUD (peptic ulcer disease)  S/P implantation of automatic cardioverter/defibrillator (AICD) 1/14/2015 [address] upgrade to AICD implant  SSS (sick sinus syndrome) ([facility] [place] [address] Past Surgical History: 
Past Surgical History:  
Procedure Laterality Date  CARDIAC SURG PROCEDURE UNLIST    
 pacemaker/defibrilator.  HX GYN    
 BTL  
 HX PACEMAKER    
 VT COLONOSCOPY W/BIOPSY SINGLE/MULTIPLE  3/26/2012  VT COLSC FLX W/RMVL OF TUMOR POLYP LESION SNARE TQ  3/26/2012 Family History: 
Family History Problem Relation Age of Onset  Arrhythmia Mother  Hypertension Mother  Hypertension Father Social History: 
Social History Tobacco Use  Smoking status: Never Smoker  Smokeless tobacco: Never Used Substance Use Topics  Alcohol use: No  
  Alcohol/week: 0.0 standard drinks  Drug use: No  
 
 
Allergies: Allergies Allergen Reactions  Contrast Agent [Iodine] Anaphylaxis Made aware of allergy 1/4/13  Sulfa (Sulfonamide Antibiotics) Hives  Codeine Anxiety  Pcn [Penicillins] Anaphylaxis Reaction was to penicillin injections into buttocks. She can take oral amoxicillin Current Medications: No current facility-administered medications on file prior to encounter. Current Outpatient Medications on File Prior to Encounter Medication Sig Dispense Refill  metOLazone (ZAROXOLYN) 2.5 mg tablet TAKE 1 TABLET BY MOUTH EVERY 7 DAYS AS NEEDED FOR WEIGHT GAIN OF GREATER THAN 4 LBS 30 Tab 0  
 apixaban (ELIQUIS) 5 mg tablet Take 1 Tab by mouth two (2) times a day.  60 Tab 2  
 nitroglycerin (NITROSTAT) 0.4 mg SL tablet TAKE 1 TABLET UNDER THE TONGUE EVERY 5 MINTUES AS NEEDED FOR CHEST PAIN CALL 911 IF NOT RELIEVED BY 3 TABLETS 50 Tab 0  
 aspirin delayed-release 81 mg tablet Take  by mouth daily.  ciprofloxacin HCl (CIPRO) 250 mg tablet Take  by mouth daily.  Oxygen  ferrous sulfate (IRON) 325 mg (65 mg iron) cpER Take  by mouth.  carvedilol (COREG) 6.25 mg tablet Take 1 Tab by mouth two (2) times daily (with meals). 60 Tab 0  
 furosemide (LASIX) 40 mg tablet Take 1 Tab by mouth two (2) times a day. 30 Tab 0  
 ARNUITY ELLIPTA 100 mcg/actuation dsdv inhaler INHALE 1 PUFF PO Q 24 H  4  
 potassium chloride SR (KLOR-CON 10) 10 mEq tablet Take 10 mEq by mouth daily. Patient takes 10 mEq daily and 20 mEq QHS  albuterol (PROVENTIL HFA, VENTOLIN HFA, PROAIR HFA) 90 mcg/actuation inhaler Take 1-2 Puffs by inhalation every four (4) hours as needed for Wheezing or Shortness of Breath.  Psyllium Husk-Sucrose (FIBER, PSYLLIUM HUSK/SUGAR,) 3.4 gram/11 gram powd Take 1 Cap by mouth daily.  mv-mn/folic acid/calcium/vit K (ONE-A-DAY WOMEN'S 50 PLUS PO) Take 1 Tab by mouth daily.  atorvastatin (LIPITOR) 40 mg tablet TAKE 1 TABLET BY MOUTH DAILY 90 Tab 3  
 lisinopril (PRINIVIL, ZESTRIL) 10 mg tablet TAKE 1 TABLET BY MOUTH DAILY 90 Tab 3  
 NEXIUM 40 mg capsule TAKE ONE CAPSULE BY MOUTH DAILY 30 Cap 0  
 budesonide (PULMICORT) 180 mcg/actuation aepb inhaler Take 2 puffs by inhalation two (2) times a day.  acetaminophen (TYLENOL ARTHRITIS PAIN) 650 mg CR tablet Take 650 mg by mouth every six (6) hours as needed for Pain.  cetirizine (ZYRTEC) 10 mg tablet Take 10 mg by mouth daily. Review of Systems Review of Systems Constitutional: Negative. Negative for chills and fever. HENT: Positive for nosebleeds. Negative for congestion, facial swelling, rhinorrhea, sore throat, trouble swallowing and voice change. Eyes: Negative. Respiratory: Negative. Negative for apnea, cough, chest tightness, shortness of breath and wheezing. Cardiovascular: Negative. Negative for chest pain, palpitations and leg swelling. Gastrointestinal: Negative. Negative for abdominal distention, abdominal pain, blood in stool, constipation, diarrhea, nausea and vomiting. Endocrine: Negative. Negative for cold intolerance, heat intolerance and polyuria. Genitourinary: Negative. Negative for difficulty urinating, dysuria, flank pain, frequency, hematuria and urgency. Musculoskeletal: Negative. Negative for arthralgias, back pain, myalgias, neck pain and neck stiffness. Skin: Negative. Negative for color change and rash. Neurological: Negative. Negative for dizziness, syncope, facial asymmetry, speech difficulty, weakness, light-headedness, numbness and headaches. Hematological: Negative. Does not bruise/bleed easily. Psychiatric/Behavioral: Negative. Negative for confusion and self-injury. The patient is not nervous/anxious. Physical Exam  
Physical Exam 
Vitals signs and nursing note reviewed. Constitutional:   
   General: She is not in acute distress. Appearance: She is well-developed. She is not diaphoretic. HENT:  
   Head: Normocephalic and atraumatic. Nose:  
   Comments: Active bleeding from left nares Mouth/Throat:  
   Pharynx: No oropharyngeal exudate. Eyes:  
   Conjunctiva/sclera: Conjunctivae normal.  
   Pupils: Pupils are equal, round, and reactive to light. Neck: Musculoskeletal: Normal range of motion. Cardiovascular:  
   Rate and Rhythm: Normal rate and regular rhythm. Heart sounds: Normal heart sounds. No murmur. No friction rub. No gallop. Pulmonary:  
   Effort: Pulmonary effort is normal. No respiratory distress. Breath sounds: Normal breath sounds. No wheezing or rales. Comments: 3L oxygen (baseline per patient) Chest:  
   Chest wall: No tenderness. Abdominal:  
   General: Bowel sounds are normal. There is no distension. Palpations: Abdomen is soft. There is no mass. Tenderness: There is no tenderness. There is no guarding or rebound. Musculoskeletal: Normal range of motion. General: No tenderness or deformity. Skin: 
   General: Skin is warm. Findings: No rash. Neurological:  
   Mental Status: She is alert and oriented to person, place, and time. Cranial Nerves: No cranial nerve deficit. Motor: No abnormal muscle tone. Coordination: Coordination normal.  
   Deep Tendon Reflexes: Reflexes normal.  
 
 
 
Diagnostic Study Results Labs - No results found for this or any previous visit (from the past 24 hour(s)). Radiologic Studies - No orders to display CT Results  (Last 48 hours) None CXR Results  (Last 48 hours) None Medical Decision Making I am the first provider for this patient. I reviewed the vital signs, available nursing notes, past medical history, past surgical history, family history and social history. Vital Signs-Reviewed the patient's vital signs. Visit Vitals BP (!) 151/94 Pulse 91 Temp 97.9 °F (36.6 °C) Resp 18 Ht 5' 5\" (1.651 m) Wt 89.8 kg (198 lb) SpO2 98% BMI 32.95 kg/m² Pulse Oximetry Analysis - 98% on RA Cardiac Monitor:  
Rate: 91 bpm 
Rhythm: Normal Sinus Rhythm Records Reviewed: Nursing Notes, Old Medical Records, Previous electrocardiograms, Previous Radiology Studies and Previous Laboratory Studies Provider Notes (Medical Decision Making):  
Patient presents with epistaxis. Stable vitals but actively bleeding on exam. DDx: Due to elevated blood pressure, allergies, anticoagulation, drug use, trauma. Will apply compression with Afrin spray and monitor. If epistaxis continues, will provide further nosebleed management with topical medical management such as vasoconstrictors vs chemical cautery vs Rhinorocket placement.  Once epistaxis is controlled in ED, will refer to ENT vs PCP for outpatient evaluation. Home Nosebleed Instructions: 
1)  Apply nasal saline spray, x2 sprays in each nostril, every 2-3 hours and as needed. 2)  Apply afrin nasal spray, [id number] in each nostril, twice a day for 2-3 days (NO MORE THAN 3 DAYS). 3) Vaseline or bacitracin dab to both nostrils twice daily and as needed 4) NO nose blowing/wiping/digital manipulation, NO hot showers/soups, NO strenuous activity/strainting/bending over, sneeze with mouth open 5) If nosebleed recurs, first apply pressure to soft part of nose for at least 10 minutes, gargle ice water, administer Afrin, repeat. ED Course:  
Initial assessment performed. The patients presenting problems have been discussed, and they are in agreement with the care plan formulated and outlined with them. I have encouraged them to ask questions as they arise throughout their visit. HYPERTENSION COUNSELING Education was provided to the patient today regarding their hypertension. Patient is made aware of their elevated blood pressure and is instructed to follow up this week with their Primary Care for a recheck. Patient is counseled regarding consequences of chronic, uncontrolled hypertension including kidney disease, heart disease, stroke or even death. Patient states their understanding and agrees to follow up this week. Additionally, during their visit, I discussed sodium restriction, maintaining ideal body weight and regular exercise program as physiologic means to achieve blood pressure control. The patient will strive towards this. I reviewed our electronic medical record system for any past medical records that were available that may contribute to the patient's current condition, the nursing notes and vital signs from today's visit. Kaylene Montoya MD 
 
ED Orders Placed : 
Orders Placed This Encounter  DISCONTD: oxymetazoline (AFRIN) 0.05 % nasal spray 2 Spray  
 oxymetazoline (AFRIN, OXYMETAZOLINE,) 0.05 % nasal spray Procedure Note - Epistaxis Management:  
Performed by: Wendy Corbett MD 
Complexity: simple After administration of oxymetozline, the patient underwent direct pressure application for 15 minutes. Lesle Gulling Hemostasis was achieved after placement. Estimated blood loss: scant The procedure took 1-15 minutes, and pt tolerated well. Progress Note: 
Patient has been reassessed and reports feeling better and symptoms have improved significantly after ED treatment. Patient feels comfortable going home with close follow-up. Chanell Nelson's final labs and imaging have been reviewed with her and available family and/or caregiver. They have been counseled regarding her diagnosis. She verbally conveys understanding and agreement of the signs, symptoms, diagnosis, treatment and prognosis and additionally agrees to follow up as recommended with Dr. Kiara Byrd MD and/or specialist in 24 - 48 hours. She also agrees with the care-plan we created together and conveys that all of her questions have been answered. I have also put together some discharge instructions for her that include: 1) educational information regarding their diagnosis, 2) how to care for their diagnosis at home, as well a 3) list of reasons why they would want to return to the ED prior to their follow-up appointment should the patient's condition change or symptoms worsen. I have answered all questions to the patient's satisfaction. Strict return precautions given. She both understood and agreed with plan as discussed. Vital signs stable for discharge. Disposition: DISCHARGE The pt is ready for discharge. The pt's signs, symptoms, diagnosis, and discharge instructions have been discussed and pt has conveyed their understanding. The pt is to follow up as recommended or return to ER should their symptoms worsen. Plan has been discussed and pt is in agreement. PLAN: 
1. Return precautions as discussed.  
 
2.  
 Discharge Medication List as of 11/20/2019 10:48 AM  
  
START taking these medications Details  
oxymetazoline (AFRIN, OXYMETAZOLINE,) 0.05 % nasal spray 2 Sprays by Both Nostrils route two (2) times a day for 3 days. , Print, Disp-1 Each, R-0  
  
  
CONTINUE these medications which have NOT CHANGED Details  
metOLazone (ZAROXOLYN) 2.5 mg tablet TAKE 1 TABLET BY MOUTH EVERY 7 DAYS AS NEEDED FOR WEIGHT GAIN OF GREATER THAN 4 LBS, Normal, Disp-30 Tab, R-0  
  
apixaban (ELIQUIS) 5 mg tablet Take 1 Tab by mouth two (2) times a day., Normal, Disp-60 Tab, R-2  
  
nitroglycerin (NITROSTAT) 0.4 mg SL tablet TAKE 1 TABLET UNDER THE TONGUE EVERY 5 MINTUES AS NEEDED FOR CHEST PAIN CALL 911 IF NOT RELIEVED BY 3 TABLETS, Normal, Disp-50 Tab, R-0  
  
aspirin delayed-release 81 mg tablet Take  by mouth daily. , Historical Med  
  
ciprofloxacin HCl (CIPRO) 250 mg tablet Take  by mouth daily. , Historical Med  
  
Oxygen Historical Med  
  
ferrous sulfate (IRON) 325 mg (65 mg iron) cpER Take  by mouth., Historical Med  
  
carvedilol (COREG) 6.25 mg tablet Take 1 Tab by mouth two (2) times daily (with meals). , Normal, Disp-60 Tab, R-0  
  
furosemide (LASIX) 40 mg tablet Take 1 Tab by mouth two (2) times a day., Normal, Disp-30 Tab, R-0  
  
ARNUITY ELLIPTA 100 mcg/actuation dsdv inhaler INHALE 1 PUFF PO Q 24 H, Historical Med, R-4, [place]AS[place]E  
  
potassium chloride SR (KLOR-CON 10) 10 mEq tablet Take 10 mEq by mouth daily. Patient takes 10 mEq daily and 20 mEq QHS, Historical Med  
  
albuterol (PROVENTIL HFA, VENTOLIN HFA, PROAIR HFA) 90 mcg/actuation inhaler Take 1-2 Puffs by inhalation every four (4) hours as needed for Wheezing or Shortness of Breath., Historical Med Psyllium Husk-Sucrose (FIBER, PSYLLIUM HUSK/SUGAR,) 3.4 gram/11 gram powd Take 1 Cap by mouth daily. , Historical Med  
  
mv-mn/folic acid/calcium/vit K (ONE-A-DAY WOMEN'S 50 PLUS PO) Take 1 Tab by mouth daily. , Historical Med  
  
 atorvastatin (LIPITOR) 40 mg tablet TAKE 1 TABLET BY MOUTH DAILY, Normal, Disp-90 Tab, R-3  
  
lisinopril (PRINIVIL, ZESTRIL) 10 mg tablet TAKE 1 TABLET BY MOUTH DAILY, Normal, Disp-90 Tab, R-3 NEXIUM 40 mg capsule TAKE ONE CAPSULE BY MOUTH DAILY, Mail Order, Disp-30 Cap, R-0  
  
budesonide (PULMICORT) 180 mcg/actuation aepb inhaler Take 2 puffs by inhalation two (2) times a day., Historical Med  
  
acetaminophen (TYLENOL ARTHRITIS PAIN) 650 mg CR tablet Take 650 mg by mouth every six (6) hours as needed for Pain., Historical Med  
  
cetirizine (ZYRTEC) 10 mg tablet Take 10 mg by mouth daily. , Historical Med 3. Follow-up Information Follow up With Specialties Details Why Contact Info Gregoria Ruffin MD [facility] 1st floor [facility] [address] [id number] 
[phone] [facility] EMERGENCY DEPT Emergency Medicine  As needed, If symptoms worsen [address] [facility] 
[phone] Return to ED if worse Diagnosis Clinical Impression: 1. Left-sided epistaxis 2. Chronic respiratory failure with hypoxia (HCC) 3. Accelerated hypertension 4. Chronic anticoagulation Attestation: 
I personally performed the services described in this documentation on this date 11/20/2019 for patient, Arley Zambrano. Sierra Juan MD 
 
 
This note will not be viewable in [address]

## 2019-11-25 NOTE — ED NOTES
Gave pt 1 nitro. Dr. Isaac Shanks does not want pt to get anymore because she had 2 before arrival. Also okay with holding on the aspirin because pt already received 324mg

## 2019-11-25 NOTE — ED PROVIDER NOTES
EMERGENCY DEPARTMENT HISTORY AND PHYSICAL EXAM 
 
 
Date: 11/25/2019 Patient Name: Shiela Patel Patient Age and Sex: 80 y.o. female History of Presenting Illness Chief Complaint Patient presents with  Chest Pain L sided transient throughout the day History Provided By: Patient and family HPI: Shiela Patel Is a 80-year-old female with past medical history of dementia, CAD, heart failure presenting today with chest pain. Patient reports that she has left-sided chest pain that is underneath the left breast.  She denies any radiation. She has no fevers or cough or chills. She states that she took nitroglycerin but is unsure of whether or not this helped her symptoms. The patient's daughter states that she occasionally has increased memory difficulty if she is sick. There are no other complaints, changes, or physical findings at this time. PCP: Blossom Shipley MD 
 
No current facility-administered medications on file prior to encounter. Current Outpatient Medications on File Prior to Encounter Medication Sig Dispense Refill  lisinopril (PRINIVIL, ZESTRIL) 5 mg tablet Take 5 mg by mouth daily.  metOLazone (ZAROXOLYN) 2.5 mg tablet TAKE 1 TABLET BY MOUTH EVERY 7 DAYS AS NEEDED FOR WEIGHT GAIN OF GREATER THAN 4 LBS 30 Tab 0  
 apixaban (ELIQUIS) 5 mg tablet Take 1 Tab by mouth two (2) times a day. 60 Tab 2  
 nitroglycerin (NITROSTAT) 0.4 mg SL tablet TAKE 1 TABLET UNDER THE TONGUE EVERY 5 MINTUES AS NEEDED FOR CHEST PAIN CALL 911 IF NOT RELIEVED BY 3 TABLETS 50 Tab 0  
 aspirin delayed-release 81 mg tablet Take 81 mg by mouth daily.  ciprofloxacin HCl (CIPRO) 250 mg tablet Take 125 mg by mouth daily.  Oxygen  ferrous sulfate (IRON) 325 mg (65 mg iron) cpER Take 1 Tab by mouth daily as needed.  carvedilol (COREG) 6.25 mg tablet Take 1 Tab by mouth two (2) times daily (with meals).  60 Tab 0  
  furosemide (LASIX) 40 mg tablet Take 1 Tab by mouth two (2) times a day. 30 Tab 0  
 ARNUITY ELLIPTA 100 mcg/actuation dsdv inhaler Take 1 Puff by inhalation every four (4) hours as needed. 4  
 potassium chloride SR (KLOR-CON 10) 10 mEq tablet Take 10 mEq by mouth two (2) times a day.  Psyllium Husk-Sucrose (FIBER, PSYLLIUM HUSK/SUGAR,) 3.4 gram/11 gram powd Take 1 Cap by mouth daily.  atorvastatin (LIPITOR) 40 mg tablet TAKE 1 TABLET BY MOUTH DAILY 90 Tab 3  
 NEXIUM 40 mg capsule TAKE ONE CAPSULE BY MOUTH DAILY 30 Cap 0  
 acetaminophen (TYLENOL ARTHRITIS PAIN) 650 mg CR tablet Take 650 mg by mouth every six (6) hours as needed for Pain.  cetirizine (ZYRTEC) 10 mg tablet Take 10 mg by mouth daily.  [DISCONTINUED] albuterol (PROVENTIL HFA, VENTOLIN HFA, PROAIR HFA) 90 mcg/actuation inhaler Take 1-2 Puffs by inhalation every four (4) hours as needed for Wheezing or Shortness of Breath.  [DISCONTINUED] mv-mn/folic acid/calcium/vit K (ONE-A-DAY WOMEN'S 50 PLUS PO) Take 1 Tab by mouth daily.  [DISCONTINUED] lisinopril (PRINIVIL, ZESTRIL) 10 mg tablet TAKE 1 TABLET BY MOUTH DAILY 90 Tab 3  [DISCONTINUED] budesonide (PULMICORT) 180 mcg/actuation aepb inhaler Take 2 puffs by inhalation two (2) times a day. Past History Past Medical History: 
Past Medical History:  
Diagnosis Date  Asthma  Autoimmune disease ([address] lupus  CAD (coronary artery disease)   
 cardiac cath  CAD (coronary artery disease) sick sinus syndrome  Essential hypertension  GERD (gastroesophageal reflux disease)  Glaucoma  Heart failure ([address] Lupus  Hypertension  Other ill-defined conditions(222.93)  Pacemaker  PUD (peptic ulcer disease)  S/P implantation of automatic cardioverter/defibrillator (AICD) 1/14/2015 [address] upgrade to AICD implant  SSS (sick sinus syndrome) ([facility] [place] [address] Past Surgical History: Past Surgical History:  
Procedure Laterality Date  CARDIAC SURG PROCEDURE UNLIST    
 pacemaker/defibrilator.  HX GYN    
 BTL  
 HX PACEMAKER    
 NV COLONOSCOPY W/BIOPSY SINGLE/MULTIPLE  3/26/2012  NV COLSC FLX W/RMVL OF TUMOR POLYP LESION SNARE TQ  3/26/2012 Family History: 
Family History Problem Relation Age of Onset  Arrhythmia Mother  Hypertension Mother  Hypertension Father Social History: 
Social History Tobacco Use  Smoking status: Never Smoker  Smokeless tobacco: Never Used Substance Use Topics  Alcohol use: No  
  Alcohol/week: 0.0 standard drinks  Drug use: No  
 
 
Allergies: Allergies Allergen Reactions  Contrast Agent [Iodine] Anaphylaxis Made aware of allergy 1/4/13  Sulfa (Sulfonamide Antibiotics) Hives  Codeine Anxiety  Pcn [Penicillins] Anaphylaxis Reaction was to penicillin injections into buttocks. She can take oral amoxicillin Review of Systems Constitutional: No  fever,  No  headache Skin: No  rash, No  jaundice HEENT: No  nasal congestion, No  eye drainage. Resp: No cough,  No  wheezing CV: + chest pain, No  palpitations GI: No vomiting,  No  diarrhea.,  No  constipation : No dysuria,  No  hematuria MSK: No joint pain,  No  trauma Neuro: No numbness, No  tingling Psych: No suicidal, No  paranoid Physical Exam  
 
Patient Vitals for the past 12 hrs: 
 Temp Pulse Resp BP SpO2  
11/25/19 2015  76 20 133/66 97 % 11/25/19 1930  74 21 132/74 95 % 11/25/19 1915  72 21 121/48 97 % 11/25/19 1900  72 16 118/70 99 % 11/25/19 1845  72 20 124/76 97 % 11/25/19 1840  74 21 124/84 99 % 11/25/19 1800  75 22 127/80 95 % 11/25/19 1745  74 21 129/72 100 % 11/25/19 1730  73 20 134/81 100 % 11/25/19 1715 98.2 °F (36.8 °C) 76 18 137/82 99 % General: alert, No acute distress, on home 2.5 L of O2 Eyes: EOMI, normal conjunctiva ENT: moist mucous membranes. Neck: Active, full ROM of neck. Skin: No rashes. no jaundice Lungs: Equal chest expansion. no respiratory distress. clear to auscultation bilaterally No accessory muscle usage Heart: regular rate     no peripheral edema   2+ radial pulses and DPs bilaterally Abd:  non distended soft, nontender. No rebound tenderness. No guarding Back: Full ROM 
MSK: Full, active ROM in all 4 extremities. Neuro: Person, Place, Time and Situation; normal speech;  
Psych: Cooperative with exam; Appropriate mood and affect Diagnostic Study Results Labs - Recent Results (from the past 12 hour(s)) EKG, 12 LEAD, INITIAL Collection Time: 11/25/19  5:14 PM  
Result Value Ref Range Ventricular Rate 74 BPM  
 Atrial Rate 74 BPM  
 P-R Interval 176 ms QRS Duration 124 ms Q-T Interval 430 ms QTC Calculation (Bezet) 477 ms Calculated P Axis 41 degrees Calculated R Axis -41 degrees Calculated T Axis 38 degrees Diagnosis Normal sinus rhythm Possible Left atrial enlargement Left axis deviation Left ventricular hypertrophy with QRS widening When compared with ECG of 08-OCT-2019 09:46, No significant change was found TROPONIN I Collection Time: 11/25/19  5:29 PM  
Result Value Ref Range Troponin-I, Qt. 0.19 (H) <0.05 ng/mL NT-PRO BNP Collection Time: 11/25/19  5:29 PM  
Result Value Ref Range NT pro-BNP 4,212 (H) <683 PG/ML  
METABOLIC PANEL, COMPREHENSIVE Collection Time: 11/25/19  5:29 PM  
Result Value Ref Range Sodium 138 136 - 145 mmol/L Potassium 3.8 3.5 - 5.1 mmol/L Chloride 101 97 - 108 mmol/L  
 CO2 34 (H) 21 - 32 mmol/L Anion gap 3 (L) 5 - 15 mmol/L Glucose 118 (H) 65 - 100 mg/dL BUN 30 (H) 6 - 20 MG/DL Creatinine 1.30 (H) 0.55 - 1.02 MG/DL  
 BUN/Creatinine ratio 23 (H) 12 - 20 GFR est AA 47 (L) >60 ml/min/1.73m2 GFR est non-AA 39 (L) >60 ml/min/1.73m2  Calcium 8.9 8.5 - 10.1 MG/DL  
 Bilirubin, total 0.8 0.2 - 1.0 MG/DL  
 ALT (SGPT) 16 12 - 78 U/L  
 AST (SGOT) 20 15 - 37 U/L Alk. phosphatase 73 45 - 117 U/L Protein, total 7.6 6.4 - 8.2 g/dL Albumin 3.5 3.5 - 5.0 g/dL Globulin 4.1 (H) 2.0 - 4.0 g/dL A-G Ratio 0.9 (L) 1.1 - 2.2    
CBC WITH AUTOMATED DIFF Collection Time: 11/25/19  5:29 PM  
Result Value Ref Range WBC 5.8 3.6 - 11.0 K/uL  
 RBC 3.59 (L) 3.80 - 5.20 M/uL HGB 9.8 (L) 11.5 - 16.0 g/dL HCT 33.6 (L) 35.0 - 47.0 % MCV 93.6 80.0 - 99.0 FL  
 MCH 27.3 26.0 - 34.0 PG  
 MCHC 29.2 (L) 30.0 - 36.5 g/dL  
 RDW 14.1 11.5 - 14.5 % PLATELET 057 912 - 778 K/uL MPV 9.2 8.9 - 12.9 FL  
 NRBC 0.0 0  WBC ABSOLUTE NRBC 0.00 0.00 - 0.01 K/uL NEUTROPHILS 68 32 - 75 % LYMPHOCYTES 16 12 - 49 % MONOCYTES 12 5 - 13 % EOSINOPHILS 2 0 - 7 % BASOPHILS 1 0 - 1 % IMMATURE GRANULOCYTES 1 (H) 0.0 - 0.5 % ABS. NEUTROPHILS 4.0 1.8 - 8.0 K/UL  
 ABS. LYMPHOCYTES 0.9 0.8 - 3.5 K/UL  
 ABS. MONOCYTES 0.7 0.0 - 1.0 K/UL  
 ABS. EOSINOPHILS 0.1 0.0 - 0.4 K/UL  
 ABS. BASOPHILS 0.0 0.0 - 0.1 K/UL  
 ABS. IMM. GRANS. 0.0 0.00 - 0.04 K/UL  
 DF AUTOMATED Radiologic Studies -  
XR CHEST PA LAT Final Result IMPRESSION:   
  
Shallow inspiration with elevation of the right hemidiaphragm. Cardiomegaly. No  
acute cardiopulmonary disease. Left thyroid enlargement. CT Results  (Last 48 hours) None CXR Results  (Last 48 hours)  
          
 11/25/19 1824  XR CHEST PA LAT Final result Impression:  IMPRESSION:   
   
Shallow inspiration with elevation of the right hemidiaphragm. Cardiomegaly. No  
acute cardiopulmonary disease. Left thyroid enlargement. Narrative:  EXAM:  XR CHEST PA LAT INDICATION:  cp  
   
COMPARISON:  10/8/2019 FINDINGS:  
   
PA and lateral radiographs of the chest demonstrate shallow depth of inspiration  
with elevation of the right hemidiaphragm, which is chronic.  No focal airspace  
disease is demonstrated. There is unchanged cardiomegaly and a dual-chamber  
pacer device. There is no vascular congestion or pleural fluid. There is  
tracheal deviation to the right due to enlarged thyroid gland which has  
previously been demonstrated on CT. There are degenerative changes in the  
spine. Medical Decision Making Differential Diagnosis: ACS, and STEMI, elect light derangement, pneumothorax, pneumonia I reviewed the vital signs, available nursing notes, past medical history, past surgical history, family history and social history and old medical records. On my interpretation, Laboratory workup is significant for elevated troponin 0.19, BNP is 4212, creatinine 1.3, unremarkable he CBC On my interpretation of the radiology studies chest x-ray without acute abnormality, cardiomegaly On my interpretation of the EKG normal sinus rhythm with a rate of 74, QTc is 477, left axis deviation without acute ST elevation Management/ED course: Patient presents today with chest pain. Her history is difficult due to dementia. Patient has an EKG that appears to be at baseline and a normal sinus rhythm without ST elevation. She does have an elevated troponin. She was administered 324 mg of aspirin with the paramedics. Treated with sublingual nitroglycerin she did not have significant improvement in her pain. Ultimately admitted to the hospitalist service. ED Course:  
Initial assessment performed. The patients presenting problems have been discussed, and they are in agreement with the care plan formulated and outlined with them. I have encouraged them to ask questions as they arise throughout their visit. Procedures: 
0 Disposition: Admitted Admission Note: 
Patient is being admitted to the hospital by Service: Hospitalist.  The results of their tests and reasons for their admission have been discussed with them and available family. They convey agreement and understanding for the need to be admitted and for their admission diagnosis. Diagnosis Clinical Impression: 1. Chest pain, unspecified type 2. Elevated troponin Attestations: 
Katey Pinedo MD 
 
 
 
Please note that this dictation was completed with [employer], the computer voice recognition software. Quite often unanticipated grammatical, syntax, homophones, and other interpretive errors are inadvertently transcribed by the computer software. Please disregard these errors. Please excuse any errors that have escaped final proofreading. Thank you.

## 2019-11-25 NOTE — ED TRIAGE NOTES
Patient arrives to the emergency department via EMS with a chief complaint of L sided CP that has been transient throughout the day. When I asked the patient about how long it last she replied \"youre wasting your time asking me questions about it. \" Took 2 nitro at home and it was unrelieved. EMS administered 324mg of aspirin. Pt wears 3L NC at home.

## 2019-11-26 PROBLEM — E66.9 OBESITY (BMI 30-39.9): Status: ACTIVE | Noted: 2019-01-01

## 2019-11-26 PROBLEM — N18.30 STAGE 3 CHRONIC KIDNEY DISEASE (HCC): Status: ACTIVE | Noted: 2019-01-01

## 2019-11-26 PROBLEM — K57.90 DIVERTICULOSIS: Status: RESOLVED | Noted: 2018-10-02 | Resolved: 2019-01-01

## 2019-11-26 PROBLEM — F03.90 DEMENTIA (HCC): Status: ACTIVE | Noted: 2019-01-01

## 2019-11-26 NOTE — PROGRESS NOTES
I reviewed pertinent labs and imaging, and discussed /agreed on the plan of care with Dr. Vale Heller. Hospitalist Progress Note NAME: Jordyn Fiore :  1934 MRN:  [id number] History of Present Illness: 
Patient is a AA female with past medical history of dementia, CAD and heart failure presenting today with chest pain.  Patient reports that she has left-sided chest pain that is underneath the left breast.  She denies any radiation.  She has no fevers or cough or chills.  She states that she took nitroglycerin but is unsure of whether or not this helped her symptoms.  The patient's daughter states that she occasionally has increased memory difficulty if she is sick. At this time patient is lying in bed she has dementia and is not a very reliable historian, c/o chest pain, SOB, denies N/V no diarrhea, no fever, no urinary symptoms, no other associated symptoms. Assessment / Plan: 
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  
· Continue ASA and Eliquis · Weight 194 lbs; weight 208 lbs in ED on 10/25/2019 CAD · Continue ASA, BB, and statin · Start Imdur HTN 
· Continue BB, ACEi · Labetalol PRN 
 
CKD stage III · Cr 1.40; stable from 2019 · Continue to monitor · Avoid nephrotoxins Dementia · Mild · Symptomatic care 30.0 - 39.9 Obese / Body mass index is 32.28 kg/m². Code status: Full Prophylaxis: Eliquis Recommended Disposition: Home w/Family Subjective: Chief Complaint / Reason for Physician Visit Patient in room with daughter at bedside. Patient reports CP yesterday but denies any at this time. Discussed with RN events overnight. Review of Systems: 
Symptom Y/N Comments  Symptom Y/N Comments Fever/Chills n   Chest Pain n   
Poor Appetite n   Edema Cough    Abdominal Pain Sputum    Joint Pain SOB/VILLAR y   Pruritis/Rash Nausea/vomit    Tolerating PT/OT Diarrhea    Tolerating Diet y Constipation    Other Could NOT obtain due to:   
 
Objective: VITALS:  
Last 24hrs VS reviewed since prior progress note. Most recent are: 
Patient Vitals for the past 24 hrs: 
 Temp Pulse Resp BP SpO2  
11/26/19 0721 97.6 °F (36.4 °C) 88 16 134/88 99 % 11/26/19 0300 97.6 °F (36.4 °C) 79 18 128/74 98 % 11/25/19 2300 97.5 °F (36.4 °C) 79 18 112/58 95 % 11/25/19 2134 97.4 °F (36.3 °C) 75 20 134/78 93 % 11/25/19 2015  76 20 133/66 97 % 11/25/19 1930  74 21 132/74 95 % 11/25/19 1915  72 21 121/48 97 % 11/25/19 1900  72 16 118/70 99 % 11/25/19 1845  72 20 124/76 97 % 11/25/19 1840  74 21 124/84 99 % 11/25/19 1800  75 22 127/80 95 % 11/25/19 1745  74 21 129/72 100 % 11/25/19 1730  73 20 134/81 100 % 11/25/19 1715 98.2 °F (36.8 °C) 76 18 137/82 99 % Intake/Output Summary (Last 24 hours) at 11/26/2019 1059 Last data filed at 11/26/2019 1032 Gross per 24 hour Intake 90 ml Output 800 ml Net -710 ml PHYSICAL EXAM: 
General: Pleasant elderly obese AA female. NAD  
EENT:  EOMI. Anicteric sclerae. MMM Resp:  Faint crackles to bases. No accessory muscle use CV:  Regular  rhythm,  1+ BLE pitting edema, systolic murmur GI:  Soft, Non distended, Non tender.  +Bowel sounds Neurologic:  Alert and oriented X 2, normal speech, Psych:   Fair insight. Not anxious nor agitated Skin:  No rashes. No jaundice Reviewed most current lab test results and cultures  YES Reviewed most current radiology test results   YES Review and summation of old records today    NO Reviewed patient's current orders and MAR    YES 
PMH/SH reviewed - no change compared to H&P 
 ________________________________________________________________________ Care Plan discussed with: 
  Comments Patient x Family  x Daughter in room RN x Care Manager Consultant Multidiciplinary team rounds were held today with , nursing, pharmacist and clinical coordinator. Patient's plan of care was discussed; medications were reviewed and discharge planning was addressed. ________________________________________________________________________ Total NON critical care TIME:  25   Minutes Total CRITICAL CARE TIME Spent:   Minutes non procedure based Comments >50% of visit spent in counseling and coordination of care    
________________________________________________________________________ Rogelio Kaufman NP Procedures: see electronic medical records for all procedures/Xrays and details which were not copied into this note but were reviewed prior to creation of Plan. LABS: 
I reviewed today's most current labs and imaging studies. Pertinent labs include: 
Recent Labs  
  11/26/19 
0044 11/25/19 
1729 WBC 6.9 5.8 HGB 9.5* 9.8* HCT 32.0* 33.6*  304 Recent Labs  
  11/26/19 
0044 11/25/19 
1729  138  
K 3.7 3.8  101 CO2 33* 34* * 118* BUN 28* 30* CREA 1.40* 1.30* CA 8.4* 8.9 MG 2.1  --   
ALB 3.2* 3.5 TBILI 0.8 0.8 SGOT 22 20 ALT 15 16 Signed: Rogelio Kaufman NP

## 2019-11-26 NOTE — CONSULTS
[address] [place] [address], [address] [facility]  [phone] [address] [facility] Date of  Admission: 11/25/2019  5:05 PM  
 
Admission type:Emergency Consult for: chest pain/trop Consult by: hospitalist  
 
 Subjective:  
 
Severo Bibber is a 80 y.o. female with PMH asthma, lupus, SSS s/p pacemaker, HTN, HFrEF, NICM, mitral regurg, pHTN, PAF, nonobstructive CAD, dementia who was admitted for Chest pain [R07.9]. Per ED provider note Severo Bibber presented to the ED with c/o intermittent left sided chest pain. Ms. Baron Brar had taken SL nitro. Cardiology consulted for troponin. On assessment, Severo Bibber is resting in bed in NAD. She endorses the prior chest pain, but is a little unclear on details. She was laying down when the pain started. She is unsure if the nitro helped. She's had some increased SOB and orthopnea. She's had intermittent chest pain in the past and had refused stress tests. Severo Bibber  follows with Dr Inga Cheadle for cardiology. Last ECHO 12/18 with EF 40-45%; decreased RV function; mod to severe MR; mild to mod TR; mod to severe pHTN; atrial septal anneursym. Cath 08/14 with moderate non obstructive disease. Cardiac risk factors: obesity, sedentary life style, hypertension, post-menopausal. 
 
 
Patient Active Problem List  
 Diagnosis Date Noted  COTY (acute kidney injury) ([address] 01/16/2019  Coronary artery disease involving native coronary artery of native heart without angina pectoris 12/19/2018  Mixed hyperlipidemia 12/19/2018  Essential hypertension 12/19/2018  Paroxysmal atrial fibrillation ([address] 12/19/2018  Severe obesity ([address] 12/19/2018  Diverticulosis 10/02/2018  Syncope 05/26/2016  S/P implantation of automatic cardioverter/defibrillator (AICD) 01/14/2015  Pulmonary HTN ([address] 12/04/2014  Systolic and diastolic CHF, acute on chronic ([address] 09/03/2014  Acute-on-chronic respiratory failure ([facility] 08/27/2014  Hypothyroidism 08/27/2014  Asthma 06/12/2013  Moderate to severe pulmonary hypertension ([address] 06/12/2013  Ischemic colitis ([facility] 05/14/2012  Colitis, ischemic ([facility] 03/26/2012  Hypokalemia 03/23/2012  Hypomagnesemia 03/23/2012  Rectal bleeding 03/22/2012  Acute on chronic combined systolic and diastolic heart failure ([address] 03/08/2012  Cardiac pacemaker in situ 03/08/2012  Cardiomyopathy in other diseases classified elsewhere 03/08/2012  Chronic diastolic heart failure ([address] 03/08/2012  Atrial flutter ([facility] 03/08/2012  Coronary atherosclerosis of native coronary artery 03/08/2012  Chest pain 02/25/2012  Sick sinus syndrome ([facility] 02/24/2012  
 HTN (hypertension) 02/24/2012  Hyperlipidemia 02/24/2012 Gregoria Ruffin MD 
Past Medical History:  
Diagnosis Date  Asthma  Autoimmune disease ([facility] lupus  CAD (coronary artery disease)   
 cardiac cath  CAD (coronary artery disease) sick sinus syndrome  Essential hypertension  GERD (gastroesophageal reflux disease)  Glaucoma  Heart failure ([facility] [address] Lupus  Hypertension  Other ill-defined conditions(799.89)  Pacemaker  PUD (peptic ulcer disease)  S/P implantation of automatic cardioverter/defibrillator (AICD) 1/14/2015 [address] upgrade to AICD implant  SSS (sick sinus syndrome) ([facility] Social History Socioeconomic History  Marital status: SINGLE Spouse name: Not on file  Number of children: Not on file  Years of education: Not on file  Highest education level: Not on file Tobacco Use  Smoking status: Never Smoker  Smokeless tobacco: Never Used Substance and Sexual Activity  Alcohol use: No  
  Alcohol/week: 0.0 standard drinks  Drug use: No  
 Sexual activity: Not Currently Allergies Allergen Reactions  Contrast Agent [Iodine] Anaphylaxis Made aware of allergy 1/4/13  Sulfa (Sulfonamide Antibiotics) Hives  Codeine Anxiety  Pcn [Penicillins] Anaphylaxis Reaction was to penicillin injections into buttocks. She can take oral amoxicillin Family History Problem Relation Age of Onset  Arrhythmia Mother  Hypertension Mother  Hypertension Father Current Facility-Administered Medications Medication Dose Route Frequency  furosemide (LASIX) injection 40 mg  40 mg IntraVENous BID  nitroglycerin (NITROSTAT) tablet 0.4 mg  0.4 mg SubLINGual Q5MIN PRN  
 apixaban (ELIQUIS) tablet 5 mg  5 mg Oral BID  aspirin delayed-release tablet 81 mg  81 mg Oral DAILY  atorvastatin (LIPITOR) tablet 40 mg  40 mg Oral DAILY  carvedilol (COREG) tablet 6.25 mg  6.25 mg Oral BID WITH MEALS  cetirizine (ZYRTEC) tablet 10 mg  10 mg Oral DAILY  ferrous sulfate tablet 325 mg  325 mg Oral DAILY  lisinopril (PRINIVIL, ZESTRIL) tablet 5 mg  5 mg Oral DAILY  pantoprazole (PROTONIX) tablet 40 mg  40 mg Oral DAILY  nitroglycerin (NITROSTAT) tablet 0.4 mg  0.4 mg SubLINGual PRN  potassium chloride SR (KLOR-CON 10) tablet 10 mEq  10 mEq Oral BID  labetalol (NORMODYNE;TRANDATE) injection 10 mg  10 mg IntraVENous Q6H PRN  
 albuterol-ipratropium (DUO-NEB) 2.5 MG-0.5 MG/3 ML  3 mL Nebulization Q6H PRN  
 acetaminophen (TYLENOL) tablet 650 mg  650 mg Oral Q4H PRN  
 ondansetron (ZOFRAN) injection 4 mg  4 mg IntraVENous Q6H PRN  
 traMADol (ULTRAM) tablet 50 mg  50 mg Oral Q6H PRN Review of Symptoms:  
11 systems reviewed, negative other than as stated in the HPI Objective:  
  
Visit Vitals /88 (BP 1 Location: Right arm, BP Patient Position: At rest) Pulse 88 Temp 97.6 °F (36.4 °C) Resp 16 Wt 88 kg (194 lb 0.1 oz) SpO2 99% BMI 32.28 kg/m² Physical:  
General: pleasant, elderly AAF resting in bed with pursed lip breathing Heart: RRR, 3/6 systolic murmur Lungs: slight basilar crackles Abdomen: Soft, +BS, NTND Extremities: LE vicky +DP/PT, 1+ pitting edema BLE Neurologic: disoriented to time; Forgetful;   
 
Data Review:  
Recent Labs  
  11/26/19 
0044 11/25/19 
1729 WBC 6.9 5.8 HGB 9.5* 9.8* HCT 32.0* 33.6*  304 Recent Labs  
  11/26/19 
0044 11/25/19 
1729  138  
K 3.7 3.8  101 CO2 33* 34* * 118* BUN 28* 30* CREA 1.40* 1.30* CA 8.4* 8.9 MG 2.1  --   
ALB 3.2* 3.5 TBILI 0.8 0.8 SGOT 22 20 ALT 15 16 Recent Labs  
  11/26/19 
0955 11/26/19 0044 11/25/19 
1729 TROIQ 0.10* 0.14* 0.19* Intake/Output Summary (Last 24 hours) at 11/26/2019 1037 Last data filed at 11/26/2019 1032 Gross per 24 hour Intake 90 ml Output 800 ml Net -710 ml  
  
 
Cardiographics Telemetry: SR 
ECG: NSR Echocardiogram: last as above; repeat ordered CXRAY: no acute process Assessment:  
  
 Principal Problem: 
  Chest pain (2/25/2012) Active Problems: 
  HTN (hypertension) (2/24/2012) Acute on chronic combined systolic and diastolic heart failure ([facility] (3/8/2012) Moderate to severe pulmonary hypertension ([facility] (6/12/2013) Paroxysmal atrial fibrillation ([facility] (12/19/2018) Plan:  
 
Jackie Bearden is a 80 y.o. female who presented to the ED with c/o chest pain. CXR negative. proBNP 4212; troponin 0.19/0.14;   
· Despite CXR reading as negative, hear some slight crackles on exam and patient with peripheral edema and orthopnea. Likely slight acute on chronic combined systolic/diastolic heart failure. · Give additional diuretic this morning and schedule IV lasix. · ECHO pending. Most recent EF 40-45% · Plan for stress test to evaluate chest pain. Troponin levels, flat and nonspecific, not consistent with MI. Patient slightly nervous, but agrees to proceed. She thought she had to walk on a treadmill. · Continue ASA, statin, BB for CAD history/risk. Will add imdur. · HTN stable on BB and ACEi. · eliquis and BB for PAF history Thank you for consulting [facility] Dav Chakraborty NP DNP, RN, [facility] Patient seen and examined by me with the above nurse practitioner. I personally performed all components of the history, physical, and medical decision making and agree with the assessment and plan with minor modifications as noted. Suspected acute on chronic diastolic heart failure: 
Patient has significant crackles at the bases, although chest x-ray does not appreciate any edema. Recommend diuresis. This may be part of her recombinant symptoms. Will need higher dose of diuretics at discharge. If crackles persist despite diuresis, must consider alternative causes of crackles such as interstitial lung disease. Minimal troponin elevation, chest discomfort: 
 check Lexiscan Myoview today. She has refused in the past, but is willing to proceed today. Addendum 5:16 PM: Noted negative Lexiscan Myoview. Continue with diuresis. No further ischemic evaluation needed at this time.

## 2019-11-26 NOTE — PROGRESS NOTES
Problem: Falls - Risk of 
Goal: *Absence of Falls Description Document Beverlymiquel Cai Fall Risk and appropriate interventions in the flowsheet. Outcome: Progressing Towards Goal 
Note: Fall Risk Interventions: 
Mobility Interventions: Assess mobility with egress test 
 
  
 
Medication Interventions: Patient to call before getting OOB, Teach patient to arise slowly Elimination Interventions: Call light in reach, Patient to call for help with toileting needs Problem: Patient Education: Go to Patient Education Activity Goal: Patient/Family Education Outcome: Progressing Towards Goal

## 2019-11-26 NOTE — ED NOTES
TRANSFER - OUT REPORT: 
 
Verbal report given to Leonarda(name) on Pauline Courser  being transferred to IVCU(unit) for routine progression of care Report consisted of patients Situation, Background, Assessment and  
Recommendations(SBAR). Information from the following report(s) SBAR, ED Summary, [facility] and Recent Results was reviewed with the receiving nurse. Lines:  
Peripheral IV 11/25/19 Left Antecubital (Active) Site Assessment Clean, dry, & intact 11/25/2019  6:27 PM  
Phlebitis Assessment 0 11/25/2019  6:27 PM  
Infiltration Assessment 0 11/25/2019  6:27 PM  
Dressing Status Clean, dry, & intact 11/25/2019  6:27 PM  
Dressing Type Transparent 11/25/2019  6:27 PM  
Hub Color/Line Status Pink;Flushed 11/25/2019  6:27 PM  
Action Taken Blood drawn 11/25/2019  6:27 PM  
  
 
Opportunity for questions and clarification was provided. Patient transported with: 
 Registered Nurse

## 2019-11-26 NOTE — PROGRESS NOTES
Pharmacy Clarification of Prior to Admission Medication Regimen The patient was interviewed regarding clarification of the prior to admission medication regimen. Patient's daughter and son were present in room and obtained permission from patient to discuss drug regimen with visitor(s) present. Patient's daughter was questioned regarding use of any other inhalers, topical products, over the counter medications, herbal medications, vitamin products or ophthalmic/nasal/otic medication use. Information Obtained From: RX Query, Patient's daughter Pertinent Pharmacy Findings: 
Identified High Alert Medication Information Current Anticoagulants: 
Name: Eliquis PTA medication list was corrected to the following:  
 
Prior to Admission Medications Prescriptions Last Dose Informant Taking? ARNUITY ELLIPTA 100 mcg/actuation dsdv inhaler 11/24/2019 at Unknown time Child Yes Sig: Take 1 Puff by inhalation every four (4) hours as needed. NEXIUM 40 mg capsule 11/24/2019 at Unknown time Child Yes Sig: TAKE ONE CAPSULE BY MOUTH DAILY Oxygen 11/25/2019 at Unknown time Child Yes Psyllium Husk-Sucrose (FIBER, PSYLLIUM HUSK/SUGAR,) 3.4 gram/11 gram powd 11/24/2019 at Unknown time Child Yes Sig: Take 1 Cap by mouth daily. acetaminophen (TYLENOL ARTHRITIS PAIN) 650 mg CR tablet 11/24/2019 at Unknown time Child Yes Sig: Take 650 mg by mouth every six (6) hours as needed for Pain. apixaban (ELIQUIS) 5 mg tablet 11/24/2019 at Unknown time Child Yes Sig: Take 1 Tab by mouth two (2) times a day. aspirin delayed-release 81 mg tablet 11/24/2019 at Unknown time Child Yes Sig: Take 81 mg by mouth daily. atorvastatin (LIPITOR) 40 mg tablet 11/24/2019 at Unknown time Child Yes Sig: TAKE 1 TABLET BY MOUTH DAILY  
carvedilol (COREG) 6.25 mg tablet 11/24/2019 at Unknown time Child Yes Sig: Take 1 Tab by mouth two (2) times daily (with meals). cetirizine (ZYRTEC) 10 mg tablet 11/24/2019 at Unknown time Child Yes Sig: Take 10 mg by mouth daily. ciprofloxacin HCl (CIPRO) 250 mg tablet 11/24/2019 at Unknown time Child Yes Sig: Take 125 mg by mouth daily. ferrous sulfate (IRON) 325 mg (65 mg iron) cpER 11/23/2019 at Unknown time Child Yes Sig: Take 1 Tab by mouth daily as needed. furosemide (LASIX) 40 mg tablet 11/24/2019 at Unknown time Child Yes Sig: Take 1 Tab by mouth two (2) times a day. lisinopril (PRINIVIL, ZESTRIL) 5 mg tablet 11/24/2019 at Unknown time Child Yes Sig: Take 5 mg by mouth daily. metOLazone (ZAROXOLYN) 2.5 mg tablet 11/18/2019 at Unknown time Child Yes Sig: TAKE 1 TABLET BY MOUTH EVERY 7 DAYS AS NEEDED FOR WEIGHT GAIN OF GREATER THAN 4 LBS  
nitroglycerin (NITROSTAT) 0.4 mg SL tablet 11/25/2019 at Unknown time Child Yes Sig: TAKE 1 TABLET UNDER THE TONGUE EVERY 5 MINTUES AS NEEDED FOR CHEST PAIN CALL 911 IF NOT RELIEVED BY 3 TABLETS  
potassium chloride SR (KLOR-CON 10) 10 mEq tablet 11/24/2019 at Unknown time Child Yes Sig: Take 10 mEq by mouth two (2) times a day. Facility-Administered Medications: None Thank you, 
Josesito Mario Medication History Pharmacy Technician

## 2019-11-26 NOTE — ED NOTES
Bedside and Verbal shift change report given to Talon Maldonado  (oncoming nurse) by Janeen Zhu (offgoing nurse). Report included the following information SBAR, ED Summary, MAR and Recent Results.

## 2019-11-26 NOTE — PROGRESS NOTES
Problem: Mobility Impaired (Adult and Pediatric) Goal: *Acute Goals and Plan of Care (Insert Text) Description FUNCTIONAL STATUS PRIOR TO ADMISSION: Patient was ambulatory for mainly household distances without use of RW or SPC. Uses furniture to steady herself. Does go out of the home for MD aptmts as well as to [employer] occasionally. Uses 2 L O2 at baseline. HOME SUPPORT PRIOR TO ADMISSION: The patient lived with 2 sons who assisted with ADLs as needed and helped with cooking and cleaning. Physical Therapy Goals Initiated 11/26/2019 1. Patient will move from supine to sit and sit to supine  in bed with modified independence within 7 day(s). 2.  Patient will transfer from bed to chair and chair to bed with modified independence using the least restrictive device within 7 day(s). 3.  Patient will perform sit to stand with modified independence within 7 day(s). 4.  Patient will ambulate with modified independence for 50 feet with the least restrictive device within 7 day(s). Outcome: Progressing Towards Goal 
 PHYSICAL THERAPY EVALUATION Patient: Mohan Steven (52 y.o. female) Date: 11/26/2019 Primary Diagnosis: Chest pain [R07.9] Precautions:   Fall ASSESSMENT Based on the objective data described below, the patient presents with decreased functional mobility from baseline level of function. Patient currently limited by severe SOB, decreased activity tolerance, and impaired balance. Currently needing CGA for bed mobility and Gary-CGA for transfers. Anticipate patient is not far from her baseline given she is on chronic O2 and is sedentary at baseline. Anticipate  PT with family support will be appropriate. Current Level of Function Impacting Discharge (mobility/balance): CGA bed mobility and transfers Other factors to consider for discharge: chronic O2 use at baseline, sedentary, at risk for falls given fair upright balance Patient will benefit from skilled therapy intervention to address the above noted impairments. PLAN : 
Recommendations and Planned Interventions: bed mobility training, transfer training, gait training, therapeutic exercises, neuromuscular re-education, patient and family training/education, and therapeutic activities Frequency/Duration: Patient will be followed by physical therapy:  4 times a week to address goals. Recommendation for discharge: (in order for the patient to meet his/her long term goals) Physical therapy at least 2 days/week in the home AND ensure assist and/or supervision for safety with upright mobility IF patient discharges home will need the following DME: to be determined (TBD) SUBJECTIVE:  
Patient stated I can do a little bit.  OBJECTIVE DATA SUMMARY:  
HISTORY:   
Past Medical History:  
Diagnosis Date Asthma Autoimmune disease ([facility] lupus CAD (coronary artery disease)   
 cardiac cath  
 CAD (coronary artery disease) sick sinus syndrome Colitis, ischemic ([facility] 3/26/2012 Diverticulosis 10/2/2018 Essential hypertension GERD (gastroesophageal reflux disease) Glaucoma Heart failure ([facility] HX OTHER MEDICAL Lupus Hypertension Hypokalemia 3/23/2012 Hypomagnesemia 3/23/2012 Other ill-defined conditions(799.89) Pacemaker PUD (peptic ulcer disease) Rectal bleeding 3/22/2012 S/P implantation of automatic cardioverter/defibrillator (AICD) 1/14/2015 [address] upgrade to AICD implant SSS (sick sinus syndrome) ([facility] Syncope 5/26/2016 Past Surgical History:  
Procedure Laterality Date CARDIAC SURG PROCEDURE UNLIST    
 pacemaker/defibrilator. HX GYN    
 BTL  
 HX PACEMAKER    
 TN COLONOSCOPY W/BIOPSY SINGLE/MULTIPLE  3/26/2012 TN COLSC FLX W/RMVL OF TUMOR POLYP LESION SNARE TQ  3/26/2012 Personal factors and/or comorbidities impacting plan of care: Home Situation Home Environment: Private residence # Steps to Enter: 0 One/Two Story Residence: One story Living Alone: No 
Support Systems: Family member(s) Patient Expects to be Discharged to[de-identified] Private residence Current DME Used/Available at Home: Oxygen, portable, Walker, Cane, straight EXAMINATION/PRESENTATION/DECISION MAKING:  
Critical Behavior: 
Neurologic State: Alert, Confused(confused at times) Orientation Level: Disoriented to time, Oriented to person, Oriented to place, Oriented to situation Cognition: Follows commands, Impaired decision making, Poor safety awareness Hearing: Auditory Auditory Impairment: None Range Of Motion: 
AROM: Generally decreased, functional 
  
  
  
  
  
  
  
Strength:   
Strength: Generally decreased, functional 
  
  
  
Functional Mobility: 
Bed Mobility: 
Rolling: Contact guard assistance; Additional time;Assist x1 Supine to Sit: Contact guard assistance; Additional time;Assist x1 Scooting: Supervision Transfers: 
Sit to Stand: Contact guard assistance Stand to Sit: Contact guard assistance Balance:  
Sitting: Intact Standing: Impaired Standing - Static: Constant support;Good Standing - Dynamic : Constant support; Oumou Abts Ambulation/Gait Training: 
Distance (ft): 20 Feet (ft) Assistive Device: Gait belt;Walker, rolling Ambulation - Level of Assistance: Contact guard assistance; Additional time;Assist x1 Gait Description (WDL): Exceptions to [facility] Gait Abnormalities: Decreased step clearance;Shuffling gait Base of Support: Widened Speed/Kasie: Pace decreased (<100 feet/min); Slow Step Length: Left shortened;Right shortened Functional Measure: 
Barthel Index: 
 
Bathin Bladder: 10 Bowels: 10 
Groomin Dressin Feeding: 10 Mobility: 10 Stairs: 5 Toilet Use: 5 Transfer (Bed to Chair and Back): 10 Total: 70/100 The Barthel ADL Index: Guidelines 1. The index should be used as a record of what a patient does, not as a record of what a patient could do. 2. The main aim is to establish degree of independence from any help, physical or verbal, however minor and for whatever reason. 3. The need for supervision renders the patient not independent. 4. A patient's performance should be established using the best available evidence. Asking the patient, friends/relatives and nurses are the usual sources, but direct observation and common sense are also important. However direct testing is not needed. 5. Usually the patient's performance over the preceding 24-48 hours is important, but occasionally longer periods will be relevant. 6. Middle categories imply that the patient supplies over 50 per cent of the effort. 7. Use of aids to be independent is allowed. Tawny Rosario., Barthel, D.W. (1501). Functional evaluation: the Barthel Index. [address] [facility] (14)2. Clari Taylor ellen SULLY Chiang, Lynn Lofton., [facility]., [facility] Adjutant, [facility] (1999). Measuring the change indisability after inpatient rehabilitation; comparison of the responsiveness of the Barthel Index and Functional Carter Measure. Journal of Neurology, Neurosurgery, and Psychiatry, 66(4), 933-505. El Saleem, N.[place].A, CHAPITO Gonzalez, & Tomas Duran MMARY ANNE. (2004.) Assessment of post-stroke quality of life in cost-effectiveness studies: The usefulness of the Barthel Index and the EuroQoL-5D. [facility], 13, [phone] Pain Rating: 
No c/o pain Activity Tolerance:  
Fair and requires rest breaks Please refer to the flowsheet for vital signs taken during this treatment. After treatment patient left in no apparent distress:  
Sitting in chair, Call bell within reach, and Caregiver / family present COMMUNICATION/EDUCATION:  
The patients plan of care was discussed with: Physical Therapist, Occupational Therapist, and Registered Nurse. Fall prevention education was provided and the patient/caregiver indicated understanding., Patient/family have participated as able in goal setting and plan of care. , and Patient/family agree to work toward stated goals and plan of care. Thank you for this referral. 
Carlos Contreras, PT, DPT Time Calculation: 19 mins

## 2019-11-26 NOTE — H&P
Hospitalist Admission Note NAME: Mohan Steven :  1934 MRN:  [id number] Date/Time:  2019 7:55 PM 
 
Patient PCP: Krunal Urias MD 
______________________________________________________________________ Given the patient's current clinical presentation, I have a high level of concern for decompensation if discharged from the emergency department. Complex decision making was performed, which includes reviewing the patient's available past medical records, laboratory results, and x-ray films. My assessment of this patient's clinical condition and my plan of care is as follows. Assessment / Plan: 
Chest pain: r/o ACS, c/w ASA, Eliquis, BB, check serial enzymes, ECHO, get Cardiology evaluation. H/O A. Fib/SSS/PPM: in NSR at this time, c/w Eliquis, BB. Chronic Systolic Heart Failure: not in acute failure at this time, c/w BB, ACE, Diuretics, monitor I/o and weight, Cardiology consult requested. Chronic Respiratory Failure: on Home O2 2-3LNC  
CAD: having chest pain, c/w ASA, BB, statin, Cardiology consult requested. HTN: c/w BB, ACE, use labetalol prn 
CKD: seems stable, monitor Dementia: is mild, lives with son. Code Status: Full Code Surrogate Decision Maker: Omaira Ledronnie [phone] [id number] or [phone] [facility] [id number] DVT Prophylaxis: Eliquis GI Prophylaxis: not indicated Baseline: has dementia, lives with son Subjective: CHIEF COMPLAINT: \"I have chest pain\" HISTORY OF PRESENT ILLNESS:    
Selam Del Angel is a 80 y.o.  female  with past medical history of dementia, CAD, heart failure presenting today with chest pain. Patient reports that she has left-sided chest pain that is underneath the left breast.  She denies any radiation. She has no fevers or cough or chills. She states that she took nitroglycerin but is unsure of whether or not this helped her symptoms.   The patient's daughter states that she occasionally has increased memory difficulty if she is sick. At this time patient is lying in bed she has dementia and is not a very reliable historian, c/o chest pain, SOB, denies N/V no diarrhea, no fever, no urinary symptoms, no other associated symptoms. We were asked to admit for work up and evaluation of the above problems. Past Medical History:  
Diagnosis Date  Asthma  Autoimmune disease ([facility] [address] lupus  CAD (coronary artery disease)   
 cardiac cath  CAD (coronary artery disease) sick sinus syndrome  Essential hypertension  GERD (gastroesophageal reflux disease)  Glaucoma  Heart failure ([facility] [address] Lupus  Hypertension  Other ill-defined conditions(799.89)  Pacemaker  PUD (peptic ulcer disease)  S/P implantation of automatic cardioverter/defibrillator (AICD) 1/14/2015 [address] upgrade to AICD implant  SSS (sick sinus syndrome) ([facility] [address] Past Surgical History:  
Procedure Laterality Date  CARDIAC SURG PROCEDURE UNLIST    
 pacemaker/defibrilator.  HX GYN    
 BTL  
 HX PACEMAKER    
 NM COLONOSCOPY W/BIOPSY SINGLE/MULTIPLE  3/26/2012  NM COLSC FLX W/RMVL OF TUMOR POLYP LESION SNARE TQ  3/26/2012 Social History Tobacco Use  Smoking status: Never Smoker  Smokeless tobacco: Never Used Substance Use Topics  Alcohol use: No  
  Alcohol/week: 0.0 standard drinks Family History Problem Relation Age of Onset  Arrhythmia Mother  Hypertension Mother  Hypertension Father Allergies Allergen Reactions  Contrast Agent [Iodine] Anaphylaxis Made aware of allergy 1/4/13  Sulfa (Sulfonamide Antibiotics) Hives  Codeine Anxiety  Pcn [Penicillins] Anaphylaxis Reaction was to penicillin injections into buttocks. She can take oral amoxicillin Prior to Admission medications Medication Sig Start Date End Date Taking? Authorizing Provider lisinopril (PRINIVIL, ZESTRIL) 5 mg tablet Take 5 mg by mouth daily. Yes Provider, Historical  
metOLazone (ZAROXOLYN) 2.5 mg tablet TAKE 1 TABLET BY MOUTH EVERY 7 DAYS AS NEEDED FOR WEIGHT GAIN OF GREATER THAN 4 LBS 11/12/19  Yes Erika Cabral MD  
apixaban (ELIQUIS) 5 mg tablet Take 1 Tab by mouth two (2) times a day. 9/27/19  Yes Daya Biswas MD  
nitroglycerin (NITROSTAT) 0.4 mg SL tablet TAKE 1 TABLET UNDER THE TONGUE EVERY 5 MINTUES AS NEEDED FOR CHEST PAIN CALL 911 IF NOT RELIEVED BY 3 TABLETS 5/15/19  Yes Vahid Jaramillo NP  
aspirin delayed-release 81 mg tablet Take 81 mg by mouth daily. Yes Provider, Historical  
ciprofloxacin HCl (CIPRO) 250 mg tablet Take 125 mg by mouth daily. Yes Provider, Historical  
Oxygen    Yes Provider, Historical  
ferrous sulfate (IRON) 325 mg (65 mg iron) cpER Take 1 Tab by mouth daily as needed. Yes Provider, Historical  
carvedilol (COREG) 6.25 mg tablet Take 1 Tab by mouth two (2) times daily (with meals). 1/18/19  Yes Yo Boykin MD  
furosemide (LASIX) 40 mg tablet Take 1 Tab by mouth two (2) times a day. 1/18/19  Yes Yo Boykin MD  
ARNUITY ELLIPTA 100 mcg/actuation dsdv inhaler Take 1 Puff by inhalation every four (4) hours as needed. 11/22/18  Yes Provider, Historical  
potassium chloride SR (KLOR-CON 10) 10 mEq tablet Take 10 mEq by mouth two (2) times a day. Yes Other, MD Cash  
Psyllium Husk-Sucrose (FIBER, PSYLLIUM HUSK/SUGAR,) 3.4 gram/11 gram powd Take 1 Cap by mouth daily. Yes Kiesha, MD Cash  
atorvastatin (LIPITOR) 40 mg tablet TAKE 1 TABLET BY MOUTH DAILY 7/28/17  Yes Ruth TAYLOR NP  
NEXIUM 40 mg capsule TAKE ONE CAPSULE BY MOUTH DAILY 2/28/15  Yes Erika Cabral MD  
acetaminophen (TYLENOL ARTHRITIS PAIN) 650 mg CR tablet Take 650 mg by mouth every six (6) hours as needed for Pain. Yes Provider, Historical  
cetirizine (ZYRTEC) 10 mg tablet Take 10 mg by mouth daily.    Yes Other, MD Cash  
 
 
 REVIEW OF SYSTEMS:    
I am not able to complete the review of systems because: The patient is intubated and sedated The patient has altered mental status due to his acute medical problems The patient has baseline aphasia from prior stroke(s) The patient has baseline dementia and is not reliable historian The patient is in acute medical distress and unable to provide information Total of 12 systems reviewed as follows:   
   POSITIVE= underlined text  Negative = text not underlined General:  fever, chills, sweats, generalized weakness, weight loss/gain,  
   loss of appetite Eyes:    blurred vision, eye pain, loss of vision, double vision ENT:    rhinorrhea, pharyngitis Respiratory:   cough, sputum production, SOB, VILLAR, wheezing, pleuritic pain  
Cardiology:   chest pain, palpitations, orthopnea, PND, edema, syncope Gastrointestinal:  abdominal pain , N/V, diarrhea, dysphagia, constipation, bleeding Genitourinary:  frequency, urgency, dysuria, hematuria, incontinence Muskuloskeletal :  arthralgia, myalgia, back pain Hematology:  easy bruising, nose or gum bleeding, lymphadenopathy Dermatological: rash, ulceration, pruritis, color change / jaundice Endocrine:   hot flashes or polydipsia Neurological:  headache, dizziness, confusion, focal weakness, paresthesia, Speech difficulties, memory loss, gait difficulty Psychological: Feelings of anxiety, depression, agitation Objective: VITALS:   
Visit Vitals /48 Pulse 72 Temp 98.2 °F (36.8 °C) Resp 21 SpO2 97% PHYSICAL EXAM: 
 
General:    Alert, cooperative, no distress, appears stated age. HEENT: Atraumatic, anicteric sclerae, pink conjunctivae No oral ulcers, mucosa moist, throat clear, dentition poor Neck:  Supple, symmetrical,  thyroid: non tender Lungs:   Coarse BS Chest wall:  No tenderness  No Accessory muscle use. Heart:   Regular  rhythm,  CALI  No edema Abdomen:   Soft, non-tender. Not distended. Bowel sounds normal 
Extremities: No cyanosis. No clubbing,   
  Skin turgor normal, Capillary refill normal, Radial  pulse 2+ Skin:     Not pale. Not Jaundiced  No rashes Psych:  Fair insight. Not depressed. Not anxious or agitated. Neurologic: Awake, oriented x3, GCS [id number], no gross focal deficits 
 
_______________________________________________________________________ Care Plan discussed with: 
  Comments Patient y Family  y DTR, son RN y   
Care Manager Consultant:     
_______________________________________________________________________ Expected  Disposition:  
Home with Family HH/PT/OT/RN y  
SNF/LTC   
ENRRIQUE   
________________________________________________________________________ TOTAL TIME:  60 Minutes Critical Care Provided     Minutes non procedure based Comments  
 y Reviewed previous records  
>50% of visit spent in counseling and coordination of care y Discussion with patient and/or family and questions answered 
  
 
________________________________________________________________________ Signed: Mel Brennan MD 
 
Procedures: see electronic medical records for all procedures/Xrays and details which were not copied into this note but were reviewed prior to creation of Plan. LAB DATA REVIEWED:   
Recent Results (from the past 24 hour(s)) EKG, 12 LEAD, INITIAL Collection Time: 11/25/19  5:14 PM  
Result Value Ref Range Ventricular Rate 74 BPM  
 Atrial Rate 74 BPM  
 P-R Interval 176 ms QRS Duration 124 ms Q-T Interval 430 ms QTC Calculation (Bezet) 477 ms Calculated P Axis 41 degrees Calculated R Axis -41 degrees Calculated T Axis 38 degrees Diagnosis Normal sinus rhythm Possible Left atrial enlargement Left axis deviation Left ventricular hypertrophy with QRS widening When compared with ECG of 08-OCT-2019 09:46, No significant change was found TROPONIN I Collection Time: 11/25/19  5:29 PM  
Result Value Ref Range Troponin-I, Qt. 0.19 (H) <0.05 ng/mL NT-PRO BNP Collection Time: 11/25/19  5:29 PM  
Result Value Ref Range NT pro-BNP 4,212 (H) <333 PG/ML  
METABOLIC PANEL, COMPREHENSIVE Collection Time: 11/25/19  5:29 PM  
Result Value Ref Range Sodium 138 136 - 145 mmol/L Potassium 3.8 3.5 - 5.1 mmol/L Chloride 101 97 - 108 mmol/L  
 CO2 34 (H) 21 - 32 mmol/L Anion gap 3 (L) 5 - 15 mmol/L Glucose 118 (H) 65 - 100 mg/dL BUN 30 (H) 6 - 20 MG/DL Creatinine 1.30 (H) 0.55 - 1.02 MG/DL  
 BUN/Creatinine ratio 23 (H) 12 - 20 GFR est AA 47 (L) >60 ml/min/1.73m2 GFR est non-AA 39 (L) >60 ml/min/1.73m2 Calcium 8.9 8.5 - 10.1 MG/DL Bilirubin, total 0.8 0.2 - 1.0 MG/DL  
 ALT (SGPT) 16 12 - 78 U/L  
 AST (SGOT) 20 15 - 37 U/L Alk. phosphatase 73 45 - 117 U/L Protein, total 7.6 6.4 - 8.2 g/dL Albumin 3.5 3.5 - 5.0 g/dL Globulin 4.1 (H) 2.0 - 4.0 g/dL A-G Ratio 0.9 (L) 1.1 - 2.2    
CBC WITH AUTOMATED DIFF Collection Time: 11/25/19  5:29 PM  
Result Value Ref Range WBC 5.8 3.6 - 11.0 K/uL  
 RBC 3.59 (L) 3.80 - 5.20 M/uL HGB 9.8 (L) 11.5 - 16.0 g/dL HCT 33.6 (L) 35.0 - 47.0 % MCV 93.6 80.0 - 99.0 FL  
 MCH 27.3 26.0 - 34.0 PG  
 MCHC 29.2 (L) 30.0 - 36.5 g/dL  
 RDW 14.1 11.5 - 14.5 % PLATELET 074 367 - 028 K/uL MPV 9.2 8.9 - 12.9 FL  
 NRBC 0.0 0  WBC ABSOLUTE NRBC 0.00 0.00 - 0.01 K/uL NEUTROPHILS 68 32 - 75 % LYMPHOCYTES 16 12 - 49 % MONOCYTES 12 5 - 13 % EOSINOPHILS 2 0 - 7 % BASOPHILS 1 0 - 1 % IMMATURE GRANULOCYTES 1 (H) 0.0 - 0.5 % ABS. NEUTROPHILS 4.0 1.8 - 8.0 K/UL  
 ABS. LYMPHOCYTES 0.9 0.8 - 3.5 K/UL  
 ABS. MONOCYTES 0.7 0.0 - 1.0 K/UL  
 ABS. EOSINOPHILS 0.1 0.0 - 0.4 K/UL  
 ABS. BASOPHILS 0.0 0.0 - 0.1 K/UL  
 ABS. IMM. GRANS. 0.0 0.00 - 0.04 K/UL  
 DF AUTOMATED

## 2019-11-26 NOTE — PROGRESS NOTES
[address] Lives with son No qualifyng dx for [facility] Not homebound PTOT recommendations pending Reason for Admission:   Chest pain RRAT Score:   22 Resources/supports as identified by patient/family:   Lives with son; family support Top Challenges facing patient (as identified by patient/family and CM): None identified Finances/Medication cost?      insured Transportation? Family provides to/from Support system or lack thereof?  family Living arrangements?    l,jeanette with son Self-care/ADLs/Cognition? Alert and oriented this assessment Independent with bathing, dressing, feeding. Family provides transportation to/from appointments Current Advanced Directive/Advance Care Plan: 
Not on file Plan for utilizing home health: Yet to be determined Transition of Care Plan:      
Home Follow up appointments CM met with patient and her daughter, Abby Camacho. Pt name and  confirmed as pt identifiers. Demographics confirmed. Preferred Rx  - [facility] [address] [facility] [place] Care Management Interventions PCP Verified by CM: Yes Mode of Transport at Discharge: Other (see comment)(family) Transition of Care Consult (CM Consult): Discharge Planning Discharge Durable Medical Equipment: No 
Physical Therapy Consult: Yes Occupational Therapy Consult: Yes Current Support Network: Relative's Home(lives with son) Confirm Follow Up Transport: Family Plan discussed with Pt/Family/Caregiver: Yes Discharge Location Discharge Placement: Home Berenice Jefferson RN CM Ext [phone]

## 2019-11-26 NOTE — PROGRESS NOTES
2100: Received report for ED nurse, Venice Boone. Reviewed ED Summary, MAR and Resent results. 2134: Pt arrived to [facility]. Cardiac monitoring continued. VSS, NSR, A/OX4, 2.5L/min O2 Nasal Cannula (baseline). Pt reports no SOB or CP. Family at bedside. Assessment completed by nurse. 2230: Pt ambulated from the bed to the bedside commode with assistance from family member. Pt voided. Pt returned to bed.  
 
2300: VSS, NSR, 2.5L/min O2 nasal cannula. Pt reports no SOB/CP. 2330: Pt ambulated from the bed to the bedside commode with assistance from family member. Pt voided. Pt returned to bed. 0000: Pt disoriented to time, pt unable to remember the year. Pt reoriented. Pt oriented to self, place, and situation. Labs drawn. 0300: VSS, NSR, no complaints. Pt pulled PIV out of Left arm. 22G PIV placed Left AC. Pt resting in bed, family at bedsdie. 0700: Bedside and Verbal shift change report given to day shift nurse Alycia Monday (oncoming nurse) by Jerald Hein RN (offgoing nurse). Report included the following information: SBAR, Kardex, Intake/Output, MAR, Procedural information, and Recent Results.

## 2019-11-26 NOTE — PROGRESS NOTES
OCCUPATIONAL THERAPY EVALUATION/DISCHARGE Patient: Sandie Rendon (40 y.o. female) Date: 11/26/2019 Primary Diagnosis: Chest pain [R07.9] Precautions:   Fall ASSESSMENT Based on the objective data described below, the patient presents with no complaint of pain, supportive daughter present and patient had assist with LE ADL's, bathing and mobility at baseline, at this time at her baseline for basic ADL's and functional transfers and feel further OT is not indicated. . 
 
Current Level of Function (ADLs/self-care): SBA to min assist for ADL transfers and mobility (hand held as she does at home), min assist LE ADL's, CGA toileting Functional Outcome Measure: The patient scored 70/100 on the Barthel Index outcome measure Other factors to consider for discharge: has 7 children which all assist in her care PLAN : 
Recommend with staff: Recommend with nursing patient to complete as able in order to maintain strength, endurance and independence: ADLs with supervision/setup, OOB to chair 3x/day and mobilizing to the bathroom for toileting with CGA to min  assist. Thank you for your assistance. Recommendation for discharge: (in order for the patient to meet his/her long term goals) No skilled occupational therapy/ follow up rehabilitation needs identified at this time. This discharge recommendation: 
Has not yet been discussed the attending provider and/or case management IF patient discharges home will need the following DME: none SUBJECTIVE:  
Patient stated I just want to go home.  OBJECTIVE DATA SUMMARY:  
HISTORY:  
Past Medical History:  
Diagnosis Date Asthma Autoimmune disease ([facility] [address] lupus CAD (coronary artery disease)   
 cardiac cath  
 CAD (coronary artery disease) sick sinus syndrome Colitis, ischemic ([facility] [address] 3/26/2012 Diverticulosis 10/2/2018 Essential hypertension GERD (gastroesophageal reflux disease) Glaucoma Heart failure ([facility] [address] HX OTHER MEDICAL Lupus Hypertension Hypokalemia 3/23/2012 Hypomagnesemia 3/23/2012 Other ill-defined conditions(799.89) Pacemaker PUD (peptic ulcer disease) Rectal bleeding 3/22/2012 S/P implantation of automatic cardioverter/defibrillator (AICD) 1/14/2015 [address] upgrade to AICD implant SSS (sick sinus syndrome) ([facility] [address] Syncope 5/26/2016 Past Surgical History:  
Procedure Laterality Date CARDIAC SURG PROCEDURE UNLIST    
 pacemaker/defibrilator. HX GYN    
 BTL  
 HX PACEMAKER    
 [place] COLONOSCOPY W/BIOPSY SINGLE/MULTIPLE  3/26/2012 [place] COLSC FLX W/RMVL OF TUMOR POLYP LESION SNARE TQ  3/26/2012 Prior Level of Function/Environment/Context: assist of children for LE dressing, bathing, ambulates with hand held assist, stand pivot transfers from her recliner to bedside commode and back during day Expanded or extensive additional review of patient history:  
Home Situation Home Environment: Private residence # Steps to Enter: 0 One/Two Story Residence: One story Living Alone: No 
Support Systems: Family member(s) Patient Expects to be Discharged to[de-identified] Private residence Current DME Used/Available at Home: Oxygen, portable, Walker, Cane, straight EXAMINATION OF PERFORMANCE DEFICITS: 
Cognitive/Behavioral Status: 
Neurologic State: Alert Orientation Level: Oriented to person;Oriented to place;Oriented to situation;Disoriented to time Cognition: Follows commands; Appropriate safety awareness;Memory loss Perception: Appears intact Perseveration: No perseveration noted Safety/Judgement: Awareness of environment; Fall prevention;Good awareness of safety precautions; Insight into deficits;Home safety Skin: intact Edema: minimal LE's Hearing: Auditory Auditory Impairment: None Vision/Perceptual:   
    
   Not formally assessed Range of Motion: 
AROM: Generally decreased, functional 
  
Strength: 
 Strength: Generally decreased, functional 
  
Coordination: 
  
Fine Motor Skills-Upper: Left Intact; Right Intact Balance: 
Sitting: Intact Standing: Impaired Standing - Static: Constant support;Good Standing - Dynamic : Constant support; Lamine Grime Functional Mobility and Transfers for ADLs: 
Bed Mobility: 
Rolling: Contact guard assistance; Additional time;Assist x1 Supine to Sit: Contact guard assistance; Additional time;Assist x1 Scooting: Supervision Transfers: 
Sit to Stand: Contact guard assistance Stand to Sit: Contact guard assistance Bed to Chair: Stand-by assistance Bathroom Mobility: Minimum assistance Toilet Transfer : Minimum assistance;Contact guard assistance; Adaptive equipment;Assist x1(stand pivot to and from bedside commode) ADL Assessment: 
Feeding: Other (comment)(NPO however could perform) Oral Facial Hygiene/Grooming: Modified Independent;Supervision Bathing: Minimum assistance Upper Body Dressing: Setup Lower Body Dressing: Minimum assistance Toileting: Contact guard assistance ADL Intervention and task modifications: 
  
 
  
Educated on role of OT, fall prevention, pursed lip breathing, benefit of lowering oxygen if at 100%, positioning in supine in bed with use of wedge and where to purchase as she prefers to sit in recliner and reported having hard time laying flat in bed, Daughter appreciative and going to look into purchasing wedge for in her bed Cognitive Retraining Safety/Judgement: Awareness of environment; Fall prevention;Good awareness of safety precautions; Insight into deficits;Home safety Functional Measure: 
Barthel Index: 
 
Bathin Bladder: 10 Bowels: 10 
Groomin Dressin Feeding: 10 Mobility: 10 Stairs: 5 Toilet Use: 5 Transfer (Bed to Chair and Back): 10 Total: 70/100 The Barthel ADL Index: Guidelines 1.  The index should be used as a record of what a patient does, not as a record of what a patient could do. 2. The main aim is to establish degree of independence from any help, physical or verbal, however minor and for whatever reason. 3. The need for supervision renders the patient not independent. 4. A patient's performance should be established using the best available evidence. Asking the patient, friends/relatives and nurses are the usual sources, but direct observation and common sense are also important. However direct testing is not needed. 5. Usually the patient's performance over the preceding 24-48 hours is important, but occasionally longer periods will be relevant. 6. Middle categories imply that the patient supplies over 50 per cent of the effort. 7. Use of aids to be independent is allowed. Zohaib Granda., Barthel, D.W. ([id number]). Functional evaluation: the Barthel Index. [address] [facility] (14)2. SULLY Corral, Ofe Grant., Rose Marie Headley., [place], [address] [facility] (1999). Measuring the change indisability after inpatient rehabilitation; comparison of the responsiveness of the Barthel Index and Functional [place] Measure. Journal of Neurology, Neurosurgery, and Psychiatry, 66(4), 059-004. Karly Michelle, N.J.A, EMILIE Gonzalez.AJ.ROBIN, & Ewelina Medel, M.A. (2004.) Assessment of post-stroke quality of life in cost-effectiveness studies: The usefulness of the Barthel Index and the EuroQoL-5D. [facility], 13, 810-08 Occupational Therapy Evaluation Charge Determination History Examination Decision-Making LOW Complexity : Brief history review  LOW Complexity : 1-3 performance deficits relating to physical, cognitive , or psychosocial skils that result in activity limitations and / or participation restrictions  MEDIUM Complexity : Patient may present with comorbidities that affect occupational performnce.  Miniml to moderate modification of tasks or assistance (eg, physical or verbal ) with assesment(s) is necessary to enable patient to complete evaluation Based on the above components, the patient evaluation is determined to be of the following complexity level: LOW Pain Rating: No complaint Activity Tolerance:  
Good and on 5 liters initially , decreased to 2.5 and remained in upper 90's Please refer to the flowsheet for vital signs taken during this treatment. After treatment patient left in no apparent distress:   
Supine in bed, Call bell within reach, and Caregiver / family present COMMUNICATION/EDUCATION:  
The patients plan of care was discussed with: Registered Nurse. Thank you for this referral. 
Joyce Nino, OTR/L Time Calculation: 23 mins

## 2019-11-27 NOTE — PROGRESS NOTES
Problem: Falls - Risk of 
Goal: *Absence of Falls Description Document Anju Rubio Fall Risk and appropriate interventions in the flowsheet. 11/27/2019 0137 by Terence Acuña RN Outcome: Progressing Towards Goal 
Note: Fall Risk Interventions: 
Mobility Interventions: Assess mobility with egress test, Bed/chair exit alarm, Communicate number of staff needed for ambulation/transfer, Patient to call before getting OOB, Strengthening exercises (ROM-active/passive), Utilize walker, cane, or other assistive device, Utilize gait belt for transfers/ambulation Mentation Interventions: Adequate sleep, hydration, pain control, Bed/chair exit alarm, Door open when patient unattended, Familiar objects from home, Family/sitter at bedside, Increase mobility, Reorient patient, Room close to nurse's station, Self-releasing belt Medication Interventions: Patient to call before getting OOB, Teach patient to arise slowly, Utilize gait belt for transfers/ambulation Elimination Interventions: Bed/chair exit alarm, Call light in reach, Patient to call for help with toileting needs, Stay With Me (per policy), Toilet paper/wipes in reach, Toileting schedule/hourly rounds 11/27/2019 0137 by Terence Acuña, RN Reactivated

## 2019-11-27 NOTE — PHYSICIAN ADVISORY
Letter of Status Determination: Current Status OBSERVATION is Appropriate Pt Name:  Vincent Johns MR#  [id number] [facility]#   [id number] Room and Hospital  2159/01  @ [facility] Hospitalization date  11/25/2019  5:05 PM  
Current Attending Physician  Fara Cruz MD  
Principal diagnosis  Chest pain Clinicals Ace Laguna is a 80 y.o.  female  with past medical history of dementia, CAD, heart failure presenting today with chest pain.  Patient reports that she has left-sided chest pain that is underneath the left breast.  She denies any radiation.  She has no fevers or cough or chills.  She states that she took nitroglycerin but is unsure of whether or not this helped her symptoms.  The patient's daughter states that she occasionally has increased memory difficulty if she is sick. At this time patient is lying in bed she has dementia and is not a very reliable historian, c/o chest pain, SOB, denies N/V no diarrhea, no fever, no urinary symptoms, no other associated symptoms. Pt in acute on chronic CHF, on IV diuresis, maintaining O2 lizeth, stress test negative, needing optimization [place] MCG criteria Does  NOT apply STATUS DETERMINATION  On the basis of clinical data, available documentaion, we believe that the current status of this patient as OBSERVATION is Appropriate The final decision of the patient's hospitalization status depends on the attending physician's judgment Additional comments Insurance  Payor: [employer] MEDICARE / Plan: [facility] [employer] MEDICARE CHOICE PPO/PFFS / Product Type: Managed Care Medicare / Insurance Information [address] [place] [address]. CHOICE PPO/PFFS Phone:   
 Subscriber: Flex Catherine Subscriber#: [id number] Group#: [id number] Precert#:   
  
 
  
 
 
 
 
Davide Mendez MD 
Cell: [phone] Physician Advisor

## 2019-11-27 NOTE — PROGRESS NOTES
I reviewed pertinent labs and imaging, and discussed /agreed on the plan of care with Dr. Vale Heller. Hospitalist Progress Note NAME: Jordyn Fiore :  1934 MRN:  [id number] History of Present Illness: 
Patient is a AA female with past medical history of dementia, CAD and heart failure presenting today with chest pain.  Patient reports that she has left-sided chest pain that is underneath the left breast.  She denies any radiation.  She has no fevers or cough or chills.  She states that she took nitroglycerin but is unsure of whether or not this helped her symptoms.  The patient's daughter states that she occasionally has increased memory difficulty if she is sick. At this time patient is lying in bed she has dementia and is not a very reliable historian, c/o chest pain, SOB, denies N/V no diarrhea, no fever, no urinary symptoms, no other associated symptoms. Assessment / Plan: 
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  
· Continue weekly metolazone · Continue Eliquis and BB 
· ECHO:  EF 56-60% · Stress test with no ischemia · Appreciate cardiology input · On home O2 2-3L NC  
· Continue ASA and Eliquis · 187 lbs today; weight 194 lbs on admission CAD · Continue ASA, BB, and statin · Continue Imdur · Stress test negative HTN 
· Continue BB, ACEi · Labetalol PRN 
 
CKD stage III · Cr 1.38; improved from 1.40 yesterday · Continue to monitor · Avoid nephrotoxins Dementia · Mild · Symptomatic care 30.0 - 39.9 Obese / Body mass index is 31.26 kg/m². Code status: Full Prophylaxis: Eliquis Recommended Disposition: Home w/Family Subjective: Chief Complaint / Reason for Physician Visit Patient daughter in room. Patient denies any new complaints at this time. Plan of care discussed with patient. Discussed with RN events overnight. Review of Systems: 
Symptom Y/N Comments  Symptom Y/N Comments Fever/Chills n   Chest Pain n   
Poor Appetite n   Edema Cough    Abdominal Pain Sputum    Joint Pain SOB/VILLAR y   Pruritis/Rash Nausea/vomit    Tolerating PT/OT Diarrhea    Tolerating Diet y Constipation    Other Could NOT obtain due to:   
 
Objective: VITALS:  
Last 24hrs VS reviewed since prior progress note. Most recent are: 
Patient Vitals for the past 24 hrs: 
 Temp Pulse Resp BP SpO2  
11/27/19 0731 97.5 °F (36.4 °C) 88 15 143/85 95 % 11/27/19 0435 97.3 °F (36.3 °C) 90 17 127/72 95 % 11/27/19 0037 98.2 °F (36.8 °C) 88 16 108/56 95 % 11/26/19 2043 97.9 °F (36.6 °C) 86 17 112/71 94 % 11/26/19 1613    134/88   
11/26/19 1500 98.3 °F (36.8 °C) 78 16 131/71 96 % Intake/Output Summary (Last 24 hours) at 11/27/2019 1124 Last data filed at 11/27/2019 7500 Gross per 24 hour Intake 480 ml Output 2300 ml Net -1820 ml PHYSICAL EXAM: 
General: No acute distress. Pleasant elderly AA female EENT:  EOMI. Anicteric sclerae. MMM Resp:  Fine crackles to bases. No accessory muscle use CV:  RRR, systolic murmur,  1+ BLE pitting edema GI:  Soft, Non distended, Non tender.  +Bowel sounds Neurologic:  Alert and oriented X 2, normal speech, Psych:   Fair insight. Not anxious nor agitated Skin:  No rashes. No jaundice Reviewed most current lab test results and cultures  YES Reviewed most current radiology test results   YES Review and summation of old records today    NO Reviewed patient's current orders and MAR    YES 
PMH/SH reviewed - no change compared to H&P 
________________________________________________________________________ Care Plan discussed with: 
  Comments Patient x Family  x Daughter in room RN x Care Manager Consultant Multidiciplinary team rounds were held today with , nursing, pharmacist and clinical coordinator. Patient's plan of care was discussed; medications were reviewed and discharge planning was addressed. ________________________________________________________________________ Total NON critical care TIME:  25   Minutes Total CRITICAL CARE TIME Spent:   Minutes non procedure based Comments >50% of visit spent in counseling and coordination of care    
________________________________________________________________________ Gretel Sierra NP Procedures: see electronic medical records for all procedures/Xrays and details which were not copied into this note but were reviewed prior to creation of Plan. LABS: 
I reviewed today's most current labs and imaging studies. Pertinent labs include: 
Recent Labs  
  11/27/19 
0510 11/26/19 
0044 11/25/19 
1729 WBC 6.5 6.9 5.8 HGB 9.6* 9.5* 9.8* HCT 31.8* 32.0* 33.6*  
 292 304 Recent Labs  
  11/27/19 
0510 11/26/19 
0044 11/25/19 
1729  139 138  
K 3.4* 3.7 3.8 CL 98 100 101 CO2 33* 33* 34* * 103* 118* BUN 28* 28* 30* CREA 1.38* 1.40* 1.30* CA 8.8 8.4* 8.9 MG  --  2.1  --   
ALB  --  3.2* 3.5 TBILI  --  0.8 0.8 SGOT  --  22 20 ALT  --  15 16 Signed: Gretel Sierra NP

## 2019-11-27 NOTE — PROGRESS NOTES
Problem: Mobility Impaired (Adult and Pediatric) Goal: *Acute Goals and Plan of Care (Insert Text) Description FUNCTIONAL STATUS PRIOR TO ADMISSION: Patient was ambulatory for mainly household distances without use of RW or SPC. Uses furniture to steady herself. Does go out of the home for MD aptmts as well as to [employer] occasionally. Uses 2 L O2 at baseline. HOME SUPPORT PRIOR TO ADMISSION: The patient lived with 2 sons who assisted with ADLs as needed and helped with cooking and cleaning. Physical Therapy Goals Initiated 11/26/2019 1. Patient will move from supine to sit and sit to supine  in bed with modified independence within 7 day(s). 2.  Patient will transfer from bed to chair and chair to bed with modified independence using the least restrictive device within 7 day(s). 3.  Patient will perform sit to stand with modified independence within 7 day(s). 4.  Patient will ambulate with modified independence for 50 feet with the least restrictive device within 7 day(s). Outcome: Progressing Towards Goal 
 PHYSICAL THERAPY TREATMENT Patient: Jose Solis (36 y.o. female) Date: 11/27/2019 Diagnosis: Chest pain [R07.9] Chest pain Precautions: Fall Chart, physical therapy assessment, plan of care and goals were reviewed. ASSESSMENT Patient continues with skilled PT services and is progressing towards goals. Pt presents with decreased strength and endurance. Pt performed x2 sit to stand transfers at [facility]. Pt ambulated 15ft and 25ft with RW at [facility]. Pt requiring to stand upright and for PLB with mobility. Pts o2 stats at 83% after first bout. Pt able to improve on second bout with o2 stats at 92% after ambulation. Pt with improved mobility tolerance. Pt educated on continuing to increase mobility throughout the day. Pt reluctant but agreeable. Current Level of Function Impacting Discharge (mobility/balance): Pt able to mobilize at [facility]. Other factors to consider for discharge: pt will need supervision, sedentary at [facility] PLAN : 
Patient continues to benefit from skilled intervention to address the above impairments. Continue treatment per established plan of care. to address goals. Recommendation for discharge: (in order for the patient to meet his/her long term goals) Physical therapy at least 2 days/week in the home AND ensure assist and/or supervision for safety with mobility. This discharge recommendation: 
Has been made in collaboration with the attending provider and/or case management IF patient discharges home will need the following DME: patient owns DME required for discharge SUBJECTIVE:  
Patient stated  Have a great holiday .  OBJECTIVE DATA SUMMARY:  
Critical Behavior: 
Neurologic State: Alert Orientation Level: Oriented X4 Cognition: Follows commands Safety/Judgement: Awareness of environment, Fall prevention, Good awareness of safety precautions, Insight into deficits, Home safety Functional Mobility Training: 
Bed Mobility: 
  
  
  
  
  
  
Transfers: 
Sit to Stand: Contact guard assistance Stand to Sit: Contact guard assistance Balance: 
Sitting: Intact Standing: Impaired Standing - Static: Good;Constant support Standing - Dynamic : Fair;Constant support Ambulation/Gait Training: 
Distance (ft): 40 Feet (ft)(25ft and 15ft) Assistive Device: Gait belt;Walker, rolling Ambulation - Level of Assistance: Contact guard assistance Gait Abnormalities: Decreased step clearance(trunk flexion) Base of Support: Widened Speed/Kasie: Pace decreased (<100 feet/min) Step Length: Right shortened;Left shortened Therapeutic Exercises:  
Seated LAQ x5 
Marching x5 Pain Rating: Pt with no complaints of pain Activity Tolerance:  
Fair, desaturates with exertion and requires oxygen, and requires rest breaks but with improved mobility tolerance. Please refer to the flowsheet for vital signs taken during this treatment. After treatment patient left in no apparent distress:  
Sitting in chair, Call bell within reach, and Caregiver / family present COMMUNICATION/COLLABORATION:  
The patients plan of care was discussed with: Registered Nurse Mirtha Burch PTA Time Calculation: 13 mins

## 2019-11-27 NOTE — PROGRESS NOTES
8966-  Pt up to [facility] by daughter. Pt developed nose bleed. This nurse was informed that she does have nose bleeds at home. Obtained \"nose clamp\" to aid in decreasing bleeding but clip was too big. Ice pack provided for bridge of nose that did aid in decreasing blood flow. After 20 minutes, nose stopped bleeding and pt was assisted back to bed. 
 
8552-  Bedside shift report given to REYES VALDEZ [facility], RN. She will assume care of pt.

## 2019-11-27 NOTE — PROGRESS NOTES
[address] [place] [address]  [phone] Cardiology Progress Note 
 
 
11/27/2019 945 AM 
 
Admit Date: 11/25/2019 Admit Diagnosis:  
Chest pain [R07.9] Interval History/Subjective:  
 
Kati Milton is a 80 y.o. female with PMH asthma, lupus, SSS s/p pacemaker, HTN, HFrEF, NICM, mitral regurg, pHTN, PAF, nonobstructive CAD, dementia who was admitted for Chest pain [R07.9]. -VSS 
-labs steady 
-weight down 7# per documentation; I/O incomplete, but negative 
-Ms. Marry Morse is feeling great today. She denies SOB or pain. Daughter at bedside states the patient hasn't had any chest pain since being here. Visit Vitals /85 Pulse 88 Temp 97.5 °F (36.4 °C) Resp 15 Ht 5' 5\" (1.651 m) Wt 85.2 kg (187 lb 13.3 oz) SpO2 95% BMI 31.26 kg/m² Current Facility-Administered Medications Medication Dose Route Frequency  furosemide (LASIX) injection 40 mg  40 mg IntraVENous BID  isosorbide mononitrate ER (IMDUR) tablet 30 mg  30 mg Oral DAILY  nitroglycerin (NITROSTAT) tablet 0.4 mg  0.4 mg SubLINGual Q5MIN PRN  
 apixaban (ELIQUIS) tablet 5 mg  5 mg Oral BID  aspirin delayed-release tablet 81 mg  81 mg Oral DAILY  atorvastatin (LIPITOR) tablet 40 mg  40 mg Oral DAILY  carvedilol (COREG) tablet 6.25 mg  6.25 mg Oral BID WITH MEALS  cetirizine (ZYRTEC) tablet 10 mg  10 mg Oral DAILY  ferrous sulfate tablet 325 mg  325 mg Oral DAILY  lisinopril (PRINIVIL, ZESTRIL) tablet 5 mg  5 mg Oral DAILY  pantoprazole (PROTONIX) tablet 40 mg  40 mg Oral DAILY  potassium chloride SR (KLOR-CON 10) tablet 10 mEq  10 mEq Oral BID  labetalol (NORMODYNE;TRANDATE) injection 10 mg  10 mg IntraVENous Q6H PRN  
 albuterol-ipratropium (DUO-NEB) 2.5 MG-0.5 MG/3 ML  3 mL Nebulization Q6H PRN  
 acetaminophen (TYLENOL) tablet 650 mg  650 mg Oral Q4H PRN  
 ondansetron (ZOFRAN) injection 4 mg  4 mg IntraVENous Q6H PRN  
  traMADol (ULTRAM) tablet 50 mg  50 mg Oral Q6H PRN Facility-Administered Medications Ordered in Other Encounters Medication Dose Route Frequency  sodium chloride (NS) flush 10 mL  10 mL IntraVENous PRN Objective:  
  
Physical Exam: 
General: pleasant, elderly AAF sitting on commode in NAD Heart: RRR, 2/6 systolic murmur Lungs: slight basilar crackles Abdomen: Soft, +BS, NTND Extremities: LE vicky +DP/PT, trace pitting edema BLE Neurologic: disoriented to time; Forgetful;   
Skin:  Warm and dry. Data Review:  
Recent Labs  
  11/27/19 
0510 11/26/19 
0044 11/25/19 
1729 WBC 6.5 6.9 5.8 HGB 9.6* 9.5* 9.8* HCT 31.8* 32.0* 33.6*  
 292 304 Recent Labs  
  11/27/19 
0510 11/26/19 
0044 11/25/19 
1729  139 138  
K 3.4* 3.7 3.8 CL 98 100 101 CO2 33* 33* 34* * 103* 118* BUN 28* 28* 30* CREA 1.38* 1.40* 1.30* CA 8.8 8.4* 8.9 MG  --  2.1  --   
ALB  --  3.2* 3.5 TBILI  --  0.8 0.8 SGOT  --  22 20 ALT  --  15 16 Recent Labs  
  11/26/19 
0955 11/26/19 
0044 11/25/19 
1729 TROIQ 0.10* 0.14* 0.19* Intake/Output Summary (Last 24 hours) at 11/27/2019 1059 Last data filed at 11/27/2019 4876 Gross per 24 hour Intake 480 ml Output 2300 ml Net -1820 ml Telemetry: SR 
ECG: NSR Echocardiogram: EF 56-60%; NWMA; mild to mod TR; mild to mod MR;  Reduced RV function CXRAY: no acute process Assessment:  
 
Principal Problem: 
  Chest pain (2/25/2012) Active Problems: 
  HTN (hypertension) (2/24/2012) Acute on chronic combined systolic and diastolic heart failure ([address] (3/8/2012) Coronary atherosclerosis of native coronary artery (3/8/2012) Overview: 40% mid LAD 2/1/2012 Hilda Medel (scanned under 1/9/2013) Moderate to severe pulmonary hypertension ([facility] (6/12/2013) [facility] (12/19/2018) Dementia ([facility] (11/26/2019) Stage 3 chronic kidney disease ([facility] (11/26/2019) Obesity (BMI 30-39.9) (11/26/2019) Plan:  
 
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 If crackles remain despite further diuresis, may consider possible ILD as cause. · EF 56-60%. · Stress test with no ischemia this admission · Continue IV diuresis today. Recommend increase in home diuretic to 80mg lasix in the AM and 40mg in PM at discharge. Would continue weekly metolazone. Focally thickened mitral valvereport states cannot rule out vegetation: 
Dr. Cassidy Brice reviewed films and thickened valve does not appear to be a vegetation, clinically appears similar to December 2018, no fever, no white blood cell count elevationmonitor clinically CAD history:  Stress test negative. · Continue ASA, statin, BB, imdur HTN: stable · BB and ACEi PAF history:   
· eliquis and BB Erick Mercedes, NP 
DNP, RN, AGACNP-BC Patient seen and examined by me with the above nurse practitioner. I personally performed all components of the history, physical, and medical decision making and agree with the assessment and plan with minor modifications as noted. Stress test negative for ischemia. Symptoms improving with diuresis. Crackles are more impressive than chest x-ray. If does not improve with continued diuresis, consider evaluation for interstitial lung diseasequestion high-resolution chest CT? Alirio Flores

## 2019-11-27 NOTE — CARDIO/PULMONARY
Cardiac Rehab Note: chart review Pt is an 81 yo admitting diagnosis acute on chronic combined systolic and diastolicheart failure. EF 60%  on 11/26/19 per echo. Smoking history assessed. Patient is a never smoker. Smoking Cessation Program link has not been added to the AVS. Current inpatient diet: DIET CARDIAC \"Living with Heart Failure\" book with daily weight calendar and s/s of heart failure magnet provided to Abdelrahman Kang on 11/27/19. Educated using teach back method. Instruction given on s/s of CHF, checking weight every am and calling MD if weight is up 2-3 lbs in a day or 5 lbs in a week (or as directed by the physician), fluid/Na restrictions, s/s of worsening CHF and when to call MD.  Pt and family at bedside reporting avoiding added table salt and canned goods. Pt uses a pillbox for medication compliance, has a scale for daily weight assessments and is up ad deanna inside of her home. Reviewed activity as tolerated with frequent rest periods as needed, taking medications as prescribed, and the importance of follow up visits with physician. Abdelrahman Kang verbalized understanding with questions answered.   Osman Stanley, RN

## 2019-11-28 NOTE — PROGRESS NOTES
RM: Home with Follow-up Appointment Pt discharged before CM met with pt and family. Darell Bender [address] [place] [address] 
[phone]

## 2019-11-28 NOTE — ROUTINE PROCESS
1107: Per cardiology note, pt okay for d/c from cardiac standpoint. MD Dr Dileep Apple informed. 1140: Discharge info reviewed with pt and family, who verbalized understanding. All questions answered. Pt discharged safely via wheelchair

## 2019-11-28 NOTE — PROGRESS NOTES
2000 called to room by daughter states pt not feeling  Well. Pt appears lethargic states \" I don't feel well \" denies chest pain , feels short of breath however O2 sats at  % , lung fields coarse no wheezing noted , crackles noted in lower bases . Pt being diuresed with 40 mg lasix bid .  . Pt in bed with eys closed . Daughter at bedside encouraging pt to do purse lip breathing . 2030 - pt alert states feeling better  Appears calmer . Still on 3L NC  
 
2223 notified by tele pt had a 20 beat run of Vtach . Pt again c/o not feeling well . Message sent to tele hospitalist for labs. 2300 Call out to RRT nurse for evaluation 2335 portable chest done . 0000 Chest x ray - no acute process noted 0130 labs drawn ad sent 0230 labs reviewed Cr. Slightly bumped to 1.5. pt resting in bed no apparent  distress noted 0730 Bedside shift change report given to REYES VALDEZ [facility] RN  (oncoming nurse) by me (offgoing nurse). Report given with SBAR, MAR and Recent Results.

## 2019-11-28 NOTE — DISCHARGE SUMMARY
Hospitalist Discharge Summary Patient ID: 
Jesus Joy [id number] 
54 y.o. 
1934 11/25/2019 PCP on record: Og Brand MD 
 
Admit date: 11/25/2019 Discharge date and time: 11/28/2019 DISCHARGE DIAGNOSIS: 
 
Active Hospital Problems Diagnosis Date Noted  Dementia ([facility] 11/26/2019  Stage 3 chronic kidney disease ([facility] 11/26/2019  Obesity (BMI 30-39.9) 11/26/2019  Paroxysmal atrial fibrillation ([facility] 12/19/2018  Moderate to severe pulmonary hypertension ([facility] 06/12/2013  Acute on chronic combined systolic and diastolic heart failure ([facility] 03/08/2012  Coronary atherosclerosis of native coronary artery 03/08/2012 40% mid LAD 2/1/2012 Manoj Amezcua Reasons (scanned under 1/9/2013)  Chest pain 02/25/2012  
 HTN (hypertension) 02/24/2012 CONSULTATIONS: 
IP CONSULT TO CARDIOLOGY Excerpted HPI from H&P of Marlin Day MD: Emigdio Chong is a 80 y.o.  female  with past medical history of dementia, CAD, heart failure presenting today with chest pain.  Patient reports that she has left-sided chest pain that is underneath the left breast.  She denies any radiation.  She has no fevers or cough or chills.  She states that she took nitroglycerin but is unsure of whether or not this helped her symptoms.  The patient's daughter states that she occasionally has increased memory difficulty if she is sick. At this time patient is lying in bed she has dementia and is not a very reliable historian, c/o chest pain, SOB, denies N/V no diarrhea, no fever, no urinary symptoms, no other associated symptoms. We were asked to admit for work up and evaluation of the above problems. \" 
______________________________________________________________________ DISCHARGE SUMMARY/HOSPITAL COURSE:  for full details see H&P, daily progress notes, labs, consult notes. Greer Milling y.o. female was admitted to [facility] on 11/25/2019 and treated for the following medical complaints:  
 
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  
· Continue weekly metolazone · Continue Eliquis and BB 
· ECHO:  EF 56-60% · Stress test with no ischemia · Appreciate cardiology input · On home O2 2-3L NC  
· Continue ASA and Eliquis · 186 lbs today; weight 194 lbs on admission  
  
CAD · Continue ASA, BB, and statin · Continue Imdur · Stress test negative  
  
HTN 
· Continue BB, ACEi · Labetalol PRN 
  
CKD stage III · Cr 1.54; increased today · Continue to monitor · Avoid nephrotoxins  
  
Dementia · Mild · Symptomatic care  
  
Patient's plan of care has been reviewed with them. Patient and/or family have verbally conveyed their understanding and agreement of the patient's signs, symptoms, diagnosis, treatment and prognosis and additionally agree to follow up as recommended or return to [facility] should their condition change prior to follow-up. Discharge instructions have also been provided to the patient with some educational information regarding their diagnosis as well a list of reasons why they would want to return to the office prior to their follow-up appointment should their condition change. [facility] to avoid frequent ED visits. [facility] can treat; pains, sprains, cuts, wounds, high fevers, upper respiratory infections and much more. There medical team is equipped with all the tools necessary to provide advanced medical care in the comfort of you home, workplace, or location of need.   The medical team consists of doctors, nurse practitioners, and EMTs. [facility] is available 7 days per week 9 am to 9 pm.   Request care by calling [phone] or by going online at [phone] [address] 
_______________________________________________________________________ Patient seen and examined by me on discharge day. Pertinent Findings: 
Gen:    Not in distress Chest: Clear lungs CVS:   Regular rhythm. No edema Abd:  Soft, not distended, not tender Neuro:  Alert, oriented 
_______________________________________________________________________ DISCHARGE MEDICATIONS:  
Current Discharge Medication List  
  
START taking these medications Details  
isosorbide mononitrate ER (IMDUR) 30 mg tablet Take 1 Tab by mouth daily. Qty: 30 Tab, Refills: 0 CONTINUE these medications which have CHANGED Details  
furosemide (LASIX) 40 mg tablet Take 80 mg in AM and 40 mg in PM 
Qty: 30 Tab, Refills: 0 CONTINUE these medications which have NOT CHANGED Details  
lisinopril (PRINIVIL, ZESTRIL) 5 mg tablet Take 5 mg by mouth daily. metOLazone (ZAROXOLYN) 2.5 mg tablet TAKE 1 TABLET BY MOUTH EVERY 7 DAYS AS NEEDED FOR WEIGHT GAIN OF GREATER THAN 4 LBS Qty: 30 Tab, Refills: 0  
  
apixaban (ELIQUIS) 5 mg tablet Take 1 Tab by mouth two (2) times a day. Qty: 60 Tab, Refills: 2  
  
nitroglycerin (NITROSTAT) 0.4 mg SL tablet TAKE 1 TABLET UNDER THE TONGUE EVERY 5 MINTUES AS NEEDED FOR CHEST PAIN CALL 911 IF NOT RELIEVED BY 3 TABLETS Qty: 50 Tab, Refills: 0  
  
aspirin delayed-release 81 mg tablet Take 81 mg by mouth daily. ciprofloxacin HCl (CIPRO) 250 mg tablet Take 125 mg by mouth daily. Oxygen   
  
ferrous sulfate (IRON) 325 mg (65 mg iron) cpER Take 1 Tab by mouth daily as needed. carvedilol (COREG) 6.25 mg tablet Take 1 Tab by mouth two (2) times daily (with meals).  
Qty: 60 Tab, Refills: 0  
  
ARNUITY ELLIPTA 100 mcg/actuation dsdv inhaler Take 1 Puff by inhalation every four (4) hours as needed. Refills: 4  
  
potassium chloride SR (KLOR-CON 10) 10 mEq tablet Take 10 mEq by mouth two (2) times a day. Psyllium Husk-Sucrose (FIBER, PSYLLIUM HUSK/SUGAR,) 3.4 gram/11 gram powd Take 1 Cap by mouth daily. atorvastatin (LIPITOR) 40 mg tablet TAKE 1 TABLET BY MOUTH DAILY Qty: 90 Tab, Refills: 3 NEXIUM 40 mg capsule TAKE ONE CAPSULE BY MOUTH DAILY Qty: 30 Cap, Refills: 0  
  
acetaminophen (TYLENOL ARTHRITIS PAIN) 650 mg CR tablet Take 650 mg by mouth every six (6) hours as needed for Pain. cetirizine (ZYRTEC) 10 mg tablet Take 10 mg by mouth daily. Patient Follow Up Instructions: Activity: Activity as tolerated Diet: Cardiac Diet Wound Care: None needed Follow-up with PCP in 1 week. Follow-up Information Follow up With Specialties Details Why Contact Info Silver Ch MD Cardiology, [address] [place] [address] Vascular Surgery, Internal Medicine Schedule an appointment as soon as possible for a visit in 2 weeks for cardiology follow up  [address] [place] [address] 
[phone] Dav Hackett MD [facility] 1st floor [address] [place] [address] 
[phone] 
  
  
 
________________________________________________________________ Risk of deterioration: Moderate Condition at Discharge:  Stable 
__________________________________________________________________ Disposition Home with family, no needs 
 
____________________________________________________________________ Code Status: Full Code 
___________________________________________________________________ Total time in minutes spent coordinating this discharge (includes going over instructions, follow-up, prescriptions, and preparing report for sign off to her PCP) :  31 minutes Signed: 
Thom Bridges NP

## 2019-11-28 NOTE — PROGRESS NOTES
Cardiology Progress Note 
 
 
11/28/2019 9:39 AM 
 
Admit Date: 11/25/2019 Admit Diagnosis: Chest pain [R07.9] Subjective:  
 
Maria Guadalupe Rojas is feeling well and at baseline, anxious to go home. daughter at bedside. Visit Vitals /82 (BP 1 Location: Left arm, BP Patient Position: At rest) Pulse 88 Temp 97.9 °F (36.6 °C) Resp 21 Ht 5' 5\" (1.651 m) Wt 186 lb 14.4 oz (84.8 kg) SpO2 100% BMI 31.10 kg/m² Current Facility-Administered Medications Medication Dose Route Frequency  furosemide (LASIX) injection 40 mg  40 mg IntraVENous BID  isosorbide mononitrate ER (IMDUR) tablet 30 mg  30 mg Oral DAILY  nitroglycerin (NITROSTAT) tablet 0.4 mg  0.4 mg SubLINGual Q5MIN PRN  
 apixaban (ELIQUIS) tablet 5 mg  5 mg Oral BID  aspirin delayed-release tablet 81 mg  81 mg Oral DAILY  atorvastatin (LIPITOR) tablet 40 mg  40 mg Oral DAILY  carvedilol (COREG) tablet 6.25 mg  6.25 mg Oral BID WITH MEALS  cetirizine (ZYRTEC) tablet 10 mg  10 mg Oral DAILY  ferrous sulfate tablet 325 mg  325 mg Oral DAILY  lisinopril (PRINIVIL, ZESTRIL) tablet 5 mg  5 mg Oral DAILY  pantoprazole (PROTONIX) tablet 40 mg  40 mg Oral DAILY  potassium chloride SR (KLOR-CON 10) tablet 10 mEq  10 mEq Oral BID  labetalol (NORMODYNE;TRANDATE) injection 10 mg  10 mg IntraVENous Q6H PRN  
 albuterol-ipratropium (DUO-NEB) 2.5 MG-0.5 MG/3 ML  3 mL Nebulization Q6H PRN  
 acetaminophen (TYLENOL) tablet 650 mg  650 mg Oral Q4H PRN  
 ondansetron (ZOFRAN) injection 4 mg  4 mg IntraVENous Q6H PRN  
 traMADol (ULTRAM) tablet 50 mg  50 mg Oral Q6H PRN Facility-Administered Medications Ordered in Other Encounters Medication Dose Route Frequency  sodium chloride (NS) flush 10 mL  10 mL IntraVENous PRN Objective:  
  
Physical Exam: 
Visit Vitals /82 (BP 1 Location: Left arm, BP Patient Position: At rest) Pulse 88 Temp 97.9 °F (36.6 °C) Resp 21  
 Ht 5' 5\" (1.651 m) Wt 186 lb 14.4 oz (84.8 kg) SpO2 100% BMI 31.10 kg/m² General Appearance:  Well developed, well nourished,alert and oriented x 3, and individual in no acute distress. Ears/Nose/Mouth/Throat:   Hearing grossly normal. 
  
    Neck: Supple. Chest:   Lungs rales bilaterally. Cardiovascular:  Regular rate and rhythm, S1, S2 normal, no murmur. Abdomen:   Soft, non-tender, bowel sounds are active. Extremities: No edema bilaterally. Skin: Warm and dry. Data Review:  
Labs:   
Recent Results (from the past 24 hour(s)) GLUCOSE, POC Collection Time: 11/27/19  7:42 PM  
Result Value Ref Range Glucose (POC) 174 (H) 65 - 100 mg/dL Performed by Chica Nagy METABOLIC PANEL, COMPREHENSIVE Collection Time: 11/28/19 12:59 AM  
Result Value Ref Range Sodium 137 136 - 145 mmol/L Potassium 4.1 3.5 - 5.1 mmol/L Chloride 99 97 - 108 mmol/L  
 CO2 33 (H) 21 - 32 mmol/L Anion gap 5 5 - 15 mmol/L Glucose 98 65 - 100 mg/dL BUN 28 (H) 6 - 20 MG/DL Creatinine 1.54 (H) 0.55 - 1.02 MG/DL  
 BUN/Creatinine ratio 18 12 - 20 GFR est AA 39 (L) >60 ml/min/1.73m2 GFR est non-AA 32 (L) >60 ml/min/1.73m2 Calcium 8.7 8.5 - 10.1 MG/DL Bilirubin, total 1.3 (H) 0.2 - 1.0 MG/DL  
 ALT (SGPT) 12 12 - 78 U/L  
 AST (SGOT) 19 15 - 37 U/L Alk. phosphatase 69 45 - 117 U/L Protein, total 7.2 6.4 - 8.2 g/dL Albumin 3.2 (L) 3.5 - 5.0 g/dL Globulin 4.0 2.0 - 4.0 g/dL A-G Ratio 0.8 (L) 1.1 - 2.2 MAGNESIUM Collection Time: 11/28/19 12:59 AM  
Result Value Ref Range Magnesium 2.3 1.6 - 2.4 mg/dL Telemetry: normal sinus rhythm Assessment:  
 
Principal Problem: 
  Chest pain (2/25/2012) Active Problems: 
  HTN (hypertension) (2/24/2012) Acute on chronic combined systolic and diastolic heart failure ([facility] (3/8/2012) Coronary atherosclerosis of native coronary artery (3/8/2012) Overview: 40% mid LAD 2/1/2012 Shalonda Dear Dr. Stuart Barnard (scanned under 1/9/2013) Moderate to severe pulmonary hypertension ([facility] (6/12/2013) Paroxysmal atrial fibrillation ([facility] (12/19/2018) Dementia ([facility] (11/26/2019) Stage 3 chronic kidney disease ([facility] (11/26/2019) Obesity (BMI 30-39.9) (11/26/2019) Plan: D/c plans noted. Continue lasix. F/u with Dr. Paolo Alcantara in a week.  
 
 
Dustin Griffin MD

## 2019-11-28 NOTE — PROGRESS NOTES
RAPID RESPONSE TEAM 
 
Called to room 2159 over concern of increased work of breathing. Patient in bed, resting at time of arrival.  RR 25, O2 sats 97%. Patient Vitals for the past 8 hrs: 
 Temp Pulse Resp BP SpO2  
11/27/19 2311  89 25  98 % 11/27/19 2309  84 (!) 35  98 % 11/27/19 2240 98.2 °F (36.8 °C) 85 (!) 32 98/60 97 % 11/27/19 1937 97.6 °F (36.4 °C) 92 20 116/66 96 % 11/27/19 1936    116/66   
11/27/19 1844 97.6 °F (36.4 °C) 93 20 108/51 95 % 11/27/19 1544 98.1 °F (36.7 °C) 88 15 121/75 95 % Patient does not appear to be in distress, but does indorse more difficulty breathing this evening. Lungs with crackles bilaterally. STAT chest Xray ordered per protocol, radiology team made aware. Primary JAKUB Evans has paged tele-hospitalist and is awaiting return page. Will await chest Xray results for further treatment orders. Please call with any needs and concerns. Maritza Santillan Rapid Response JAKUB Shah

## 2019-11-28 NOTE — PROGRESS NOTES
I reviewed pertinent labs and imaging, and discussed /agreed on the plan of care with Dr. Mitzy Adrian. Hospitalist Progress Note NAME: Yadi Baltazar :  1934 MRN:  [id number] History of Present Illness: 
Patient is a AA female with past medical history of dementia, CAD and heart failure presenting today with chest pain.  Patient reports that she has left-sided chest pain that is underneath the left breast.  She denies any radiation.  She has no fevers or cough or chills.  She states that she took nitroglycerin but is unsure of whether or not this helped her symptoms.  The patient's daughter states that she occasionally has increased memory difficulty if she is sick. At this time patient is lying in bed she has dementia and is not a very reliable historian, c/o chest pain, SOB, denies N/V no diarrhea, no fever, no urinary symptoms, no other associated symptoms. Assessment / Plan: 
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  
· Continue weekly metolazone · Continue Eliquis and BB 
· ECHO:  EF 56-60% · Stress test with no ischemia · Appreciate cardiology input · On home O2 2-3L NC  
· Continue ASA and Eliquis · 186 lbs today; weight 194 lbs on admission CAD · Continue ASA, BB, and statin · Continue Imdur · Stress test negative HTN 
· Continue BB, ACEi · Labetalol PRN 
 
CKD stage III · Cr 1.54; increased today · Continue to monitor · Avoid nephrotoxins Dementia · Mild · Symptomatic care 30.0 - 39.9 Obese / Body mass index is 31.1 kg/m². Code status: Full Prophylaxis: Eliquis Recommended Disposition: Home w/Family Subjective: Chief Complaint / Reason for Physician Visit Patient up on BSC. Daughter at bedside. Patient states she is ready for discharge. No new complaints. Discussed with RN events overnight. Review of Systems: 
Symptom Y/N Comments  Symptom Y/N Comments Fever/Chills n   Chest Pain n   
Poor Appetite n   Edema Cough    Abdominal Pain Sputum    Joint Pain SOB/VILLAR y improving  Pruritis/Rash Nausea/vomit    Tolerating PT/OT Diarrhea    Tolerating Diet y Constipation    Other Could NOT obtain due to:   
 
Objective: VITALS:  
Last 24hrs VS reviewed since prior progress note. Most recent are: 
Patient Vitals for the past 24 hrs: 
 Temp Pulse Resp BP SpO2  
11/28/19 0805 97.9 °F (36.6 °C) 88 21 128/82 100 % 11/28/19 0354 97.2 °F (36.2 °C) 91 26 135/84 90 % 11/28/19 0353    135/84   
11/27/19 2351  90 30  100 % 11/27/19 2311  89 25  98 % 11/27/19 2309  84 (!) 35  98 % 11/27/19 2240 98.2 °F (36.8 °C) 85 (!) 32 98/60 97 % 11/27/19 1937 97.6 °F (36.4 °C) 92 20 116/66 96 % 11/27/19 1936    116/66   
11/27/19 1844 97.6 °F (36.4 °C) 93 20 108/51 95 % 11/27/19 1544 98.1 °F (36.7 °C) 88 15 121/75 95 % 11/27/19 1121 97.8 °F (36.6 °C) 85 15 120/61 96 % Intake/Output Summary (Last 24 hours) at 11/28/2019 7264 Last data filed at 11/27/2019 2321 Gross per 24 hour Intake  Output 1300 ml Net -1300 ml PHYSICAL EXAM: 
General: Pleasant elderly AA female. NAD  
EENT:  EOMI. Anicteric sclerae. MMM Resp:  Fine crackles to bases R>L. No accessory muscle use CV:  Regular rate and rhythm, systolic murmur,  trace BLE edema GI:  Soft, Non distended, Non tender.  +Bowel sounds Neurologic:  Alert and oriented X 2, normal speech, Psych:   Fair insight. Not anxious nor agitated Skin:  No rashes. No jaundice Reviewed most current lab test results and cultures  YES Reviewed most current radiology test results   YES Review and summation of old records today    NO 
 Reviewed patient's current orders and MAR    YES 
PMH/SH reviewed - no change compared to H&P 
________________________________________________________________________ Care Plan discussed with: 
  Comments Patient x Family  x Daughter in room RN x Care Manager Consultant Multidiciplinary team rounds were held today with , nursing, pharmacist and clinical coordinator. Patient's plan of care was discussed; medications were reviewed and discharge planning was addressed. ________________________________________________________________________ Total NON critical care TIME:  25   Minutes Total CRITICAL CARE TIME Spent:   Minutes non procedure based Comments >50% of visit spent in counseling and coordination of care    
________________________________________________________________________ Thom Bridges NP Procedures: see electronic medical records for all procedures/Xrays and details which were not copied into this note but were reviewed prior to creation of Plan. LABS: 
I reviewed today's most current labs and imaging studies. Pertinent labs include: 
Recent Labs  
  11/27/19 
0510 11/26/19 
0044 11/25/19 
1729 WBC 6.5 6.9 5.8 HGB 9.6* 9.5* 9.8* HCT 31.8* 32.0* 33.6*  
 292 304 Recent Labs  
  11/28/19 
0059 11/27/19 
0510 11/26/19 
0044 11/25/19 
1729  136 139 138  
K 4.1 3.4* 3.7 3.8 CL 99 98 100 101 CO2 33* 33* 33* 34* GLU 98 103* 103* 118* BUN 28* 28* 28* 30* CREA 1.54* 1.38* 1.40* 1.30* CA 8.7 8.8 8.4* 8.9 MG 2.3  --  2.1  --   
ALB 3.2*  --  3.2* 3.5 TBILI 1.3*  --  0.8 0.8 SGOT 19  --  22 20 ALT 12  --  15 16 Signed: Thom Bridges NP

## 2019-12-14 LAB
ALBUMIN SERPL-MCNC: 3.1 G/DL (ref 3.5–5)
ALBUMIN/GLOB SERPL: 0.8 {RATIO} (ref 1.1–2.2)
ALP SERPL-CCNC: 84 U/L (ref 45–117)
ALT SERPL-CCNC: 151 U/L (ref 12–78)
ANION GAP SERPL CALC-SCNC: 8 MMOL/L (ref 5–15)
AST SERPL-CCNC: 188 U/L (ref 15–37)
ATRIAL RATE: 93 BPM
BASOPHILS # BLD: 0 K/UL (ref 0–0.1)
BASOPHILS NFR BLD: 0 % (ref 0–1)
BILIRUB SERPL-MCNC: 1 MG/DL (ref 0.2–1)
BUN SERPL-MCNC: 22 MG/DL (ref 6–20)
BUN/CREAT SERPL: 14 (ref 12–20)
CALCIUM SERPL-MCNC: 8.3 MG/DL (ref 8.5–10.1)
CALCULATED P AXIS, ECG09: 76 DEGREES
CALCULATED R AXIS, ECG10: -76 DEGREES
CALCULATED T AXIS, ECG11: -48 DEGREES
CHLORIDE SERPL-SCNC: 105 MMOL/L (ref 97–108)
CO2 SERPL-SCNC: 25 MMOL/L (ref 21–32)
CREAT SERPL-MCNC: 1.61 MG/DL (ref 0.55–1.02)
DIAGNOSIS, 93000: NORMAL
DIFFERENTIAL METHOD BLD: ABNORMAL
EOSINOPHIL # BLD: 0.1 K/UL (ref 0–0.4)
EOSINOPHIL NFR BLD: 1 % (ref 0–7)
ERYTHROCYTE [DISTWIDTH] IN BLOOD BY AUTOMATED COUNT: 14.5 % (ref 11.5–14.5)
GLOBULIN SER CALC-MCNC: 4.1 G/DL (ref 2–4)
GLUCOSE SERPL-MCNC: 227 MG/DL (ref 65–100)
HCT VFR BLD AUTO: 37 % (ref 35–47)
HGB BLD-MCNC: 10.4 G/DL (ref 11.5–16)
IMM GRANULOCYTES # BLD AUTO: 0 K/UL (ref 0–0.04)
IMM GRANULOCYTES NFR BLD AUTO: 0 % (ref 0–0.5)
LYMPHOCYTES # BLD: 3.9 K/UL (ref 0.8–3.5)
LYMPHOCYTES NFR BLD: 35 % (ref 12–49)
MCH RBC QN AUTO: 27.4 PG (ref 26–34)
MCHC RBC AUTO-ENTMCNC: 28.1 G/DL (ref 30–36.5)
MCV RBC AUTO: 97.6 FL (ref 80–99)
MONOCYTES # BLD: 0.3 K/UL (ref 0–1)
MONOCYTES NFR BLD: 3 % (ref 5–13)
MYELOCYTES NFR BLD MANUAL: 2 %
NEUTS BAND NFR BLD MANUAL: 3 %
NEUTS SEG # BLD: 6.6 K/UL (ref 1.8–8)
NEUTS SEG NFR BLD: 56 % (ref 32–75)
NRBC # BLD: 0.09 K/UL (ref 0–0.01)
NRBC BLD-RTO: 0.8 PER 100 WBC
P-R INTERVAL, ECG05: 190 MS
PLATELET # BLD AUTO: 270 K/UL (ref 150–400)
PMV BLD AUTO: 9.6 FL (ref 8.9–12.9)
POTASSIUM SERPL-SCNC: 4.4 MMOL/L (ref 3.5–5.1)
PROT SERPL-MCNC: 7.2 G/DL (ref 6.4–8.2)
Q-T INTERVAL, ECG07: 374 MS
QRS DURATION, ECG06: 100 MS
QTC CALCULATION (BEZET), ECG08: 465 MS
RBC # BLD AUTO: 3.79 M/UL (ref 3.8–5.2)
RBC MORPH BLD: ABNORMAL
RBC MORPH BLD: ABNORMAL
SODIUM SERPL-SCNC: 138 MMOL/L (ref 136–145)
TROPONIN I BLD-MCNC: 0.12 NG/ML (ref 0–0.08)
TROPONIN I SERPL-MCNC: 0.17 NG/ML
VENTRICULAR RATE, ECG03: 93 BPM
WBC # BLD AUTO: 11.1 K/UL (ref 3.6–11)

## 2019-12-14 NOTE — PROGRESS NOTES
Spiritual Care Assessment/Progress Note [address] 
 
 
NAME: Ignacio Miner      MRN: [id number] AGE: 80 y.o. SEX: female Mu-ism Affiliation: Lakisha Carias Language: English  
 
12/14/2019     Total Time (in minutes): 30 Spiritual Assessment begun in [facility] EMERGENCY DEPT through conversation with: 
  
    []Patient        [x] Family    [] Friend(s) Reason for Consult: Code Blue/99, Death, ER Spiritual beliefs: (Please include comment if needed) [x] Identifies with a elizabeth tradition:     
   [] Supported by a elizabeth community:        
   [] Claims no spiritual orientation:       
   [] Seeking spiritual identity:            
   [] Adheres to an individual form of spirituality:       
   [] Not able to assess:                   
 
    
Identified resources for coping:  
   [] Prayer                           
   [] Music                  [] Guided Imagery [x] Family/friends                 [] Pet visits [] Devotional reading                         [] Unknown 
   [] Other:                                          
 
 
Interventions offered during this visit: (See comments for more details) Patient Interventions: Prayer (actual) Family/Friend(s): Affirmation of emotions/emotional suffering, Affirmation of elizabeth, Normalization of emotional/spiritual concerns, Prayer (actual) Plan of Care: 
 
 [] Support spiritual and/or cultural needs  
 [] Support AMD and/or advance care planning process [x] Support grieving process 
 [] Coordinate Rites and/or Rituals  
 [] Coordination with community clergy [] No spiritual needs identified at this time 
 [] Detailed Plan of Care below (See Comments)  [] Make referral to Music Therapy 
[] Make referral to Pet Therapy    
[] Make referral to Addiction services 
[] Make referral to [facility] 
[] Make referral to Spiritual Care Partner 
[] No future visits requested       
[] Follow up visits as needed Comments: While in the ER, physician mentioned about this patient's death. Met with family in the consult room. Escorted them to patient room. Provided words of comfort and prayer to individual members of a large family. Family appreciative of pastoral support. [address] [place] [address] Rev. Loni Serna Paging Service: [phone])

## 2019-12-14 NOTE — ED NOTES
Pt arrived with CPR in progress. Medics state pt was witnessed cardiac arrest and they were called at 2234 and they arrived at 2240. Medics state they started an IO in rt humerus and gave 5 doses of epinephrine and shocked her one time when she was in V-Tach and then she was pulseless in PEA. P intubated by medics with 7.5 ETT and bilateral breath sounds auscultated by Dr. Bunny Krabbe. CPR continued on her arrival and pt in PEA. Narrative of code below. 2323 1 mg epi IVP 
2324 pulse check-no pulses, continue CPR 
2327 pulse check-no pulses, PEA, poor cardiac activity per US, continue CPR 
2329 1 mg epi IVP pt has pulses compressions held 2334 Cardiac activity with US 
2337 10 ml calcium chloride IVP 
2338 Magnesium Sulfate 1 GM IVPB 
2342 epinephrine drip started at 3 mcg/min IVPB 
2343 epinephrine drip to 6 mcg/min 2348 pulse check no pulse start CPR Epi 1 mg IVP 
2350 pulse check no palpable pulse, US shows poor cardiac activity PEA 
2352 pulse check no palpable pulses epi 1 mg IVP, epinephrine drip to 9 mcg/min 
2356 pulse check no palpable pulses US shows poor cardiac activity 2357 epi 1 mg IVP 
2358 pulse check no palpable pulse PEA with pacemaker spikes [address] Code called by Dr. Bunny Krabbe.

## 2019-12-14 NOTE — ED PROVIDER NOTES
EMERGENCY DEPARTMENT HISTORY AND PHYSICAL EXAM 
 
 
Date: 12/13/2019 Patient Name: Ace Castro History of Presenting Illness No chief complaint on file. History Provided By: Patient HPI: Ace Castro, 80 y.o. female with history of dementia, CKD, proximal atrial fibrillation, pulmonary hypertension, congestive heart failure, CAD, hypertension presents to the ED with cc of cardiac arrest.  Patient was at home with family when she became unresponsive, this was witnessed and immediate bystander CPR was initiated. EMS was on scene within 15 minutes and they state that she was initially in a V. tach arrest without pulses. They did perform defibrillation however patient progressed into PEA arrest throughout the remainder of their transport to our facility. They administered multiple rounds of epinephrine in route without significant effect. Patient was intubated on scene prior to arrival.  Patient had been in arrest for approximately 45 minutes prior to arrival at our facility. Upon arrival patient pulseless without spontaneous breathing through ET tube. No family present upon arrival to help with history. There are no other complaints, changes, or physical findings at this time. PCP: Matt Burch MD 
 
No current facility-administered medications on file prior to encounter. Current Outpatient Medications on File Prior to Encounter Medication Sig Dispense Refill  furosemide (LASIX) 40 mg tablet Take 80 mg in AM and 40 mg in PM 30 Tab 0  
 isosorbide mononitrate ER (IMDUR) 30 mg tablet Take 1 Tab by mouth daily. 30 Tab 0  
 lisinopril (PRINIVIL, ZESTRIL) 5 mg tablet Take 5 mg by mouth daily.  metOLazone (ZAROXOLYN) 2.5 mg tablet TAKE 1 TABLET BY MOUTH EVERY 7 DAYS AS NEEDED FOR WEIGHT GAIN OF GREATER THAN 4 LBS 30 Tab 0  
 apixaban (ELIQUIS) 5 mg tablet Take 1 Tab by mouth two (2) times a day.  60 Tab 2  
  nitroglycerin (NITROSTAT) 0.4 mg SL tablet TAKE 1 TABLET UNDER THE TONGUE EVERY 5 MINTUES AS NEEDED FOR CHEST PAIN CALL 911 IF NOT RELIEVED BY 3 TABLETS 50 Tab 0  
 aspirin delayed-release 81 mg tablet Take 81 mg by mouth daily.  ciprofloxacin HCl (CIPRO) 250 mg tablet Take 125 mg by mouth daily.  Oxygen  ferrous sulfate (IRON) 325 mg (65 mg iron) cpER Take 1 Tab by mouth daily as needed.  carvedilol (COREG) 6.25 mg tablet Take 1 Tab by mouth two (2) times daily (with meals). 60 Tab 0  
 ARNUITY ELLIPTA 100 mcg/actuation dsdv inhaler Take 1 Puff by inhalation every four (4) hours as needed. 4  
 potassium chloride SR (KLOR-CON 10) 10 mEq tablet Take 10 mEq by mouth two (2) times a day.  Psyllium Husk-Sucrose (FIBER, PSYLLIUM HUSK/SUGAR,) 3.4 gram/11 gram powd Take 1 Cap by mouth daily.  atorvastatin (LIPITOR) 40 mg tablet TAKE 1 TABLET BY MOUTH DAILY 90 Tab 3  
 NEXIUM 40 mg capsule TAKE ONE CAPSULE BY MOUTH DAILY 30 Cap 0  
 acetaminophen (TYLENOL ARTHRITIS PAIN) 650 mg CR tablet Take 650 mg by mouth every six (6) hours as needed for Pain.  cetirizine (ZYRTEC) 10 mg tablet Take 10 mg by mouth daily. Past History Past Medical History: 
Past Medical History:  
Diagnosis Date  Asthma  Autoimmune disease ([facility] lupus  CAD (coronary artery disease)   
 cardiac cath  CAD (coronary artery disease) sick sinus syndrome  Colitis, ischemic ([facility] [facility] 3/26/2012  Diverticulosis 10/2/2018  Essential hypertension  GERD (gastroesophageal reflux disease)  Glaucoma  Heart failure ([facility] [address] Lupus  Hypertension  Hypokalemia 3/23/2012  Hypomagnesemia 3/23/2012  Other ill-defined conditions(799.89)  Pacemaker  PUD (peptic ulcer disease)  Rectal bleeding 3/22/2012  S/P implantation of automatic cardioverter/defibrillator (AICD) 1/14/2015 [address] upgrade to AICD implant  SSS [facility]sick [facility] syndrome) [facility]  Syncope 5/26/2016 Past Surgical History: 
Past Surgical History:  
Procedure Laterality Date  CARDIAC SURG PROCEDURE UNLIST    
 pacemaker/defibrilator.  HX GYN    
 BTL  
 HX PACEMAKER    
 HI COLONOSCOPY W/BIOPSY SINGLE/MULTIPLE  3/26/2012  HI COLSC FLX W/RMVL OF TUMOR POLYP LESION SNARE TQ  3/26/2012 Family History: 
Family History Problem Relation Age of Onset  Arrhythmia Mother  Hypertension Mother  Hypertension Father Social History: 
Social History Tobacco Use  Smoking status: Never Smoker  Smokeless tobacco: Never Used Substance Use Topics  Alcohol use: No  
  Alcohol/week: 0.0 standard drinks  Drug use: No  
 
 
Allergies: Allergies Allergen Reactions  Contrast Agent [Iodine] Anaphylaxis Made aware of allergy 1/4/13  Sulfa (Sulfonamide Antibiotics) Hives  Codeine Anxiety  Pcn [Penicillins] Anaphylaxis Reaction was to penicillin injections into buttocks. She can take oral amoxicillin Review of Systems Review of Systems Unable to perform ROS: Intubated Physical Exam  
Physical Exam 
Constitutional:   
   Comments: Intubated, unresponsive, no spontaneous movements. Currently in PEA arrest  
HENT:  
   Head: Normocephalic and atraumatic. Nose: Nose normal.  
Eyes:  
   Comments: Pupils fixed dilated equally Neck:  
   Comments: C-collar in place Cardiovascular:  
   Comments: PEA arrest without pulses Pulmonary:  
   Comments: No spontaneous respirations. Bloody secretions through ET tube. Bilateral breath sounds present with ET tube in place. Abdominal:  
   General: Abdomen is flat. There is no distension. Musculoskeletal:     
   General: No deformity or signs of injury. Skin: 
   Findings: No erythema or rash. Neurological:  
   Comments: Unresponsive without corneal reflexes. No spontaneous movement. Diagnostic Study Results Labs - Recent Results (from the past 12 hour(s)) EKG, 12 LEAD, INITIAL Collection Time: 12/13/19 11:33 PM  
Result Value Ref Range Ventricular Rate 93 BPM  
 Atrial Rate 93 BPM  
 P-R Interval 190 ms QRS Duration 100 ms Q-T Interval 374 ms QTC Calculation (Bezet) 465 ms Calculated P Axis 76 degrees Calculated R Axis -76 degrees Calculated T Axis -48 degrees Diagnosis Sinus rhythm with premature supraventricular complexes Left anterior fascicular block ST & T wave abnormality, consider anterior ischemia When compared with ECG of 25-NOV-2019 17:14, 
premature supraventricular complexes are now present QRS duration has decreased ST now depressed in Inferior leads Inverted T waves have replaced nonspecific T wave abnormality in Inferior  
leads T wave inversion now evident in Anterior leads CBC WITH AUTOMATED DIFF Collection Time: 12/13/19 11:43 PM  
Result Value Ref Range WBC 11.1 (H) 3.6 - 11.0 K/uL  
 RBC 3.79 (L) 3.80 - 5.20 M/uL  
 HGB 10.4 (L) 11.5 - 16.0 g/dL HCT 37.0 35.0 - 47.0 % MCV 97.6 80.0 - 99.0 FL  
 MCH 27.4 26.0 - 34.0 PG  
 MCHC 28.1 (L) 30.0 - 36.5 g/dL  
 RDW 14.5 11.5 - 14.5 % PLATELET 364 995 - 278 K/uL MPV 9.6 8.9 - 12.9 FL  
 NRBC 0.8 (H) 0  WBC ABSOLUTE NRBC 0.09 (H) 0.00 - 0.01 K/uL NEUTROPHILS 56 32 - 75 % BAND NEUTROPHILS 3 % LYMPHOCYTES 35 12 - 49 % MONOCYTES 3 (L) 5 - 13 % EOSINOPHILS 1 0 - 7 % BASOPHILS 0 0 - 1 % MYELOCYTES 2 % IMMATURE GRANULOCYTES 0 0.0 - 0.5 % ABS. NEUTROPHILS 6.6 1.8 - 8.0 K/UL  
 ABS. LYMPHOCYTES 3.9 (H) 0.8 - 3.5 K/UL  
 ABS. MONOCYTES 0.3 0.0 - 1.0 K/UL  
 ABS. EOSINOPHILS 0.1 0.0 - 0.4 K/UL  
 ABS. BASOPHILS 0.0 0.0 - 0.1 K/UL  
 ABS. IMM. GRANS. 0.0 0.00 - 0.04 K/UL  
 DF AUTOMATED    
 RBC COMMENTS MACROCYTOSIS 1+ 
    
 RBC COMMENTS ALLY CELLS 
PRESENT 
    
METABOLIC PANEL, COMPREHENSIVE Collection Time: 12/13/19 11:43 PM  
Result Value Ref Range Sodium 138 136 - 145 mmol/L Potassium 4.4 3.5 - 5.1 mmol/L Chloride 105 97 - 108 mmol/L  
 CO2 25 21 - 32 mmol/L Anion gap 8 5 - 15 mmol/L Glucose 227 (H) 65 - 100 mg/dL BUN 22 (H) 6 - 20 MG/DL Creatinine 1.61 (H) 0.55 - 1.02 MG/DL  
 BUN/Creatinine ratio 14 12 - 20 GFR est AA 37 (L) >60 ml/min/1.73m2 GFR est non-AA 30 (L) >60 ml/min/1.73m2 Calcium 8.3 (L) 8.5 - 10.1 MG/DL Bilirubin, total 1.0 0.2 - 1.0 MG/DL  
 ALT (SGPT) 151 (H) 12 - 78 U/L  
 AST (SGOT) 188 (H) 15 - 37 U/L Alk. phosphatase 84 45 - 117 U/L Protein, total 7.2 6.4 - 8.2 g/dL Albumin 3.1 (L) 3.5 - 5.0 g/dL Globulin 4.1 (H) 2.0 - 4.0 g/dL A-G Ratio 0.8 (L) 1.1 - 2.2    
TROPONIN I Collection Time: 12/13/19 11:43 PM  
Result Value Ref Range Troponin-I, Qt. 0.17 (H) <0.05 ng/mL POC CHEM8 Collection Time: 12/13/19 11:43 PM  
Result Value Ref Range Calcium, ionized (POC) 1.12 1.12 - 1.32 mmol/L Sodium (POC) 137 136 - 145 mmol/L Potassium (POC) 4.5 3.5 - 5.1 mmol/L Chloride (POC) 103 98 - 107 mmol/L  
 CO2 (POC) 26 21 - 32 mmol/L Anion gap (POC) 15 10 - 20 mmol/L Glucose (POC) 244 (H) 65 - 100 mg/dL BUN (POC) 24 (H) 9 - 20 mg/dL Creatinine (POC) 1.5 (H) 0.6 - 1.3 mg/dL GFRAA, POC 40 (L) >60 ml/min/1.73m2 GFRNA, POC 33 (L) >60 ml/min/1.73m2 Hematocrit (POC) 35 35.0 - 47.0 % Comment Comment Not Indicated. POC TROPONIN-I Collection Time: 12/13/19 11:45 PM  
Result Value Ref Range Troponin-I (POC) 0.12 (H) 0.00 - 0.08 ng/mL Radiologic Studies - No orders to display CT Results  (Last 48 hours) None CXR Results  (Last 48 hours) None Medical Decision Making I am the first provider for this patient. I reviewed the vital signs, available nursing notes, past medical history, past surgical history, family history and social history. Vital Signs-Reviewed the patient's vital signs. No data found. EKG interpretation: (Preliminary) EKG performed immediately after return of spontaneous circulation per my interpretation normal sinus rhythm with rate 93 bpm, left axis deviation, diffuse T wave inversions specifically lead III, aVF, V1, V2, V3, V4, V5. No ST elevation. Records Reviewed: Nursing Notes, Old Medical Records, Previous electrocardiograms, Ambulance Run Sheet, Previous Radiology Studies and Previous Laboratory Studies Provider Notes (Medical Decision Making): This is an 80-year-old female with significant cardiac comorbidities presenting in active PEA cardiac arrest.  She had been in arrest for about 45 minutes prior to arrival at our facility. CPR was continued immediately upon arrival and patient had 3 rounds of epinephrine administered. On pulse recheck she was found to have return of spontaneous circulation. At this time EKG was performed which does not show any ST elevation but does show diffuse T wave inversion likely due to her prolonged downtime. She does not have any reflexes or protective's at this time. Troponin 0 0. 12. There is no significant electrolyte abnormality to explain her PEA arrest.  Unfortunately patient became pulseless again and despite multiple more rounds of epinephrine and CPR we were unable to regain spontaneous circulation. Given her prolonged downtime and significant comorbidities, there does not seem to be any sort of meaningful outcome at this time. Cardiac ultrasound performed by myself demonstrates cardiac standstill with agonal electrical activity but no pulses. Termination of resuscitation was performed and time of death called at 23:59. I informed family who is present in family waiting room. Condolences expressed. Critical Care Time:  
I have spent 60 minutes of critical care time in evaluating and treating this patient.   This includes time spent at bedside, time with family and decision makers, documentation, review of labs and imaging, and/or consultation with specialists. It does not include time spent on separately billed procedures. This patient presents with a critical illness or injury that acutely impairs one or more vital organ systems such that there is a high probability of imminent or life threatening deterioration in the patient's condition. This case involved decision making of high complexity to assess, manipulate, and support vital organ system failure and/or to prevent further life threatening deterioration of the patient's condition. Failure to initiate these interventions on an urgent basis would likely result in sudden, clinically significant or life threatening deterioration in the patient's condition. Abnormal findings supporting critical care: Active cardiac arrest 
Interventions to support critical care: ACLS Failure to intervene may result in: Continued cardiac arrest, hemodynamic collapse, death Disposition: 
 Diagnosis Clinical Impression: 1. Cardiac arrest ([facility] Attestations: 
 
Sam Monreal MD 
 
Please note that this dictation was completed with [employer], the computer voice recognition software. Quite often unanticipated grammatical, syntax, homophones, and other interpretive errors are inadvertently transcribed by the computer software. Please disregard these errors. Please excuse any errors that have escaped final proofreading. Thank you.

## 2021-09-09 NOTE — PROGRESS NOTES
2nd IV to be placed est 1900 with Hemoglobin draw. Nutrition Guidelines to Improve Wound Healing     It's important to eat well in order to heal well. Power foods, along with higher amounts of calories, include protein, vitamins A and C, and sometimes zinc.  All people need calories and protein to fuel their bodies for everyday life. Calories provide energy to keep the body functioning. Protein is used to build, maintain and repair body tissues. It is also very important to make sure you are eating a balanced diet to prevent any vitamin and/or mineral deficiencies; these are needed to help build and maintain new tissues.  When someone has a wound and/or an infection they will need extra protein, calories and nutrients. Below are some helpful tips on ways to increase calories, protein, vitamins and minerals in your diet to meet your increased needs.  What should I eat?            Grains Choose whole grains for higher protein content. 5 servings daily  For example: 1 slice bread, 1/2 cup cooked cereal, 1/2 cup rice or pasta, 3/4 cup dry cereal   Vegetables Add to fruit smoothies for an extra boost without a bitter taste. 2 servings daily  For example: 1 cup raw vege- tables, 2 cups raw leafy green vegetables, 1/2 cup cooked   Fruit Use as toppings for cooked cereals, yogurt, and ice cream. Choose fruit for dessert. 3 servings daily  For example: 1/4 cup dried fruit, 1 piece whole fresh fruit, 1 cup melon, berries, grapes, 1/2 cup canned fruit or 100% fruit juice   Protein Eat the protein portion of your meals first in case you become too full. 5-8 servings daily  For example: 1 oz. meat, fish, or poultry, 1/4 cup cooked beans, 1 egg, 1 Tbsp. peanut butter, 1/2 oz nuts, 2 oz. firm tofu   Dairy Substitute milk for water in recipes; add powdered milk or yogurt to shakes, smoothies, and cooked cereals. Top soups with cheese or Greek yogurt. 3 servings daily  For example: 1 cup milk or yogurt, 1.5-2.0 oz. cheese (dairy or soy)   Check your weight twice weekly. If your  weight is stable you are eating enough calories. If your weight decreases each time, you need to add more calories.  To increase calories, add snacks, calorie-containing beverages, or high-calorie condiments to foods such as butter, cream, cream cheese, olive oil, nut butters, nuts, seeds, hummus, syrup, sugar, jam, and/or jelly.  If you are unable to meet your needs through your diet alone try the following:  • Oral nutrition supplements: Many varieties are available that provide extra calories, protein, vitamins and minerals in both flavored and unflavored options. Try several to find one you enjoy as a base for shakes and smoothies.  • Take a daily vitamin with minerals if you are unable to eat the standard suggested minimum servings as listed on the first page. Always check with your doctor before taking a new vitamin.  • Make an appointment with a registered dietitian if your appetite remains poor, your wound is not healing well, or you are losing weight.      Dressing Change    A dressing is a material placed over wounds. It keeps the wound clean, dry, and protected from further injury.       GENERAL CARE  · Follow the healthcare provider’s instructions on how to care for the wound.  · Wash your hands with soap and warm water before and after caring for the wound. This helps prevent infection.  · If a bandage was applied, change it once a day or as directed. If at any time the bandage becomes wet or dirty, replace it with a new one.  · Unless told otherwise, avoid soaking the wound in water. Take showers or sponge baths instead of tub baths. Don't scrub or pick at the wound.  · Don't go swimming.  · If you have a bandage and it gets wet, use a clean cloth to gently pat the wound dry. Then replace the bandage with a dry one.  · Don't scratch, rub, or pick at the area.  · Watch for the signs of infection listed below. Any wound can get infected, even if you are taking antibiotics. Seek care right away if you see  any possible signs of infection.    BEFORE YOU BEGIN  •  Get your supplies together. Things you may need include:  ?  Salt solution (saline).  ?  Flexible gauze bandage.  ?  Medicated cream.  ?  Tape.  ?  Gloves.  ?  Belly (abdominal) pads.  ?  Gauze squares.  ?  Plastic bags.  •  Take pain medicine 30 minutes before the bandage change if you need it.  •  Take a shower before you do the first bandage change of the day. Put plastic wrap or a bag over the dressing.    REMOVING YOUR OLD BANDAGE  •  Wash your hands with soap and water. Dry your hands with a clean towel.  •  Put on your gloves.  •  Remove any tape.  •  Remove the old bandage as told. If it sticks, put a small amount of warm water on it to loosen the bandage.  •  Remove any gauze or packing tape in your wound.  •  Take off your gloves.  •  Put the gloves, tape, gauze, or any packing tape in a plastic bag.    CHANGING YOUR BANDAGE  •  Open the supplies.  •  Take the cap off the saline solution.  •  Open the gauze. Leave the gauze on the inside of the package.  •  Put on your gloves.  •  Clean your wound as told by your doctor.  •  Keep your wound dry if your doctor told you to do so.  •  Your doctor may tell you to do one or more of the following:  ?   the gauze. Pour the saline solution over the gauze. Squeeze out the extra saline solution.  ?  Clean wound area(s)  ?  Put medicated cream or other medicine on your wound as ordered.  ?  Pack your wound loosely with  Alginate dressing. Count the number of packing pieces removed as well as number packed into wound bed  ? For Sacrococcygeal wounds** : Cleanse wound with NS. Apply Santyl, the thickness of one nickel, to wound bed and barrier cream to periwound. Pack with alginate as described above. Secure with Mepilex */comparable dressing on top. Change daily and prn for drainage.   •  Take off your gloves. Put them in the plastic bag with the old bandage. Tie the bag shut and throw it away.  •  Keep  the bandage clean and dry.  •  Wash your hands.    GET HELP RIGHT AWAY IF:  •  Your skin around the wound looks red.  •  Your wound feels more tender or sore.  •  You see yellowish-white fluid (pus) in the wound.  •  Your wound smells bad.  •  You have a fever.  •  Your skin around the wound has a red rash that itches and burns.  •  You see black or yellow skin in your wound that was not there before.  •  You feel sick to your stomach (nauseous), throw up (vomit), and feel very tired.  This information is not intended to replace advice given to you by your health care provider. Make sure you discuss any questions you have with your health care provider.    Document Released: 03/16/2010 Document Revised: 01/08/2016 Document Reviewed: 10/28/2012  [employer]® Patient Information ©2016 [employer].    Follow-up care  Follow up with Naomi Royal of [facility].
